# Patient Record
Sex: FEMALE | Race: WHITE | NOT HISPANIC OR LATINO | Employment: OTHER | URBAN - METROPOLITAN AREA
[De-identification: names, ages, dates, MRNs, and addresses within clinical notes are randomized per-mention and may not be internally consistent; named-entity substitution may affect disease eponyms.]

---

## 2017-08-08 ENCOUNTER — TRANSCRIBE ORDERS (OUTPATIENT)
Dept: ADMINISTRATIVE | Facility: HOSPITAL | Age: 62
End: 2017-08-08

## 2017-08-08 DIAGNOSIS — R01.1 CARDIAC MURMUR: Primary | ICD-10-CM

## 2017-08-25 ENCOUNTER — OFFICE VISIT (OUTPATIENT)
Dept: NEUROLOGY | Facility: CLINIC | Age: 62
End: 2017-08-25

## 2017-08-25 VITALS
HEIGHT: 64 IN | HEART RATE: 89 BPM | WEIGHT: 158 LBS | SYSTOLIC BLOOD PRESSURE: 148 MMHG | DIASTOLIC BLOOD PRESSURE: 83 MMHG | OXYGEN SATURATION: 98 % | BODY MASS INDEX: 26.98 KG/M2

## 2017-08-25 DIAGNOSIS — G51.0 RIGHT-SIDED BELL'S PALSY: Primary | ICD-10-CM

## 2017-08-25 DIAGNOSIS — G24.5 BLEPHAROSPASM: ICD-10-CM

## 2017-08-25 PROCEDURE — 99204 OFFICE O/P NEW MOD 45 MIN: CPT | Performed by: NURSE PRACTITIONER

## 2017-08-25 RX ORDER — BACLOFEN 10 MG/1
10 TABLET ORAL 2 TIMES DAILY PRN
Qty: 60 TABLET | Refills: 1 | Status: SHIPPED | OUTPATIENT
Start: 2017-08-25 | End: 2017-09-22

## 2017-08-25 RX ORDER — PREDNISONE 10 MG/1
TABLET ORAL
Qty: 42 TABLET | Refills: 0 | Status: SHIPPED | OUTPATIENT
Start: 2017-08-25 | End: 2017-09-22

## 2017-08-25 NOTE — PROGRESS NOTES
Subjective:     Patient ID: Alaina Kennedy is a 62 y.o. female.    HPI:   History of Present Illness     This is a very pleasant 62-year-old female who presents for initial neurological evaluation of persistent right facial bells palsy.  She was diagnosed with right facial bells palsy on 12/6/16 at [facility].  She developed symptoms one day prior.  She tells me that her symptoms were severe at the time where she could not close her right eye and had to sleep with her eye sealed shut.  She tells me that her symptoms actually cleared up on 5/15/17 and then we had a small cold spell of weather and her symptoms came back on 6/1/2017.  She tells me that she's been getting acupuncture every 1-2 weeks for her symptoms and this has improved overall.  She tells me that she had significant hearing loss when she had the Bell's palsy and so she when saw an ear nose and throat specialist and he ordered an MRI of the brain on 1/15/17 which she had completed at [facility] and tells me there is significant swelling either over the nerve or somewhere near the EEG her.  She does not have a copy of this report today.  Initially she was treated with 2 weeks of prednisone and she was also treated with antiviral medication at initial onset of Bell's palsy.  She does have a history of rheumatoid arthritis and is on Remicade IV as well as methotrexate and followed by Dr. Mckoy at [facility] [place].  She tells me that she is delayed typically and healing process.  Concerning symptoms today are that she has a right eye twitch which is very bothersome to her.  She tells me that she takes Flexeril for chronic neck pain following a C1 to C2 fusion in 1997.  She tells me that this has not seemed to touch the symptoms.  She felt that she has a mild asymmetry in the right face and that the right thigh spasm is bothering her.  She tells me this is not painful but more annoying.  She tells me that stress does  worsen her symptoms.  She tells me that she does work a very stressful job.  She has been getting acupuncture every week and this has helped some but returns with the right eye spasms about 2 days before she is due for her next acupuncture treatment.  She denies any numbness or tingling in the face.  She does feel like a tightening of the right face at times.  She denies any rashes or any other acute symptoms to correlate with the recurrence of symptoms.    The following portions of the patient's history were reviewed and updated as appropriate: allergies, current medications, past family history, past medical history, past social history, past surgical history and problem list.    Past Medical History:   Diagnosis Date   • Arthritis    • Bell's palsy    • Neuropathy    • Rheumatoid arthritis        Past Surgical History:   Procedure Laterality Date   • BACK SURGERY     • CHOLECYSTECTOMY     • FOOT SURGERY     • HYSTERECTOMY     • KNEE SURGERY     • TOTAL HIP ARTHROPLASTY         Social History     Social History   • Marital status:      Spouse name: N/A   • Number of children: N/A   • Years of education: N/A     Occupational History   • Not on file.     Social History Main Topics   • Smoking status: Never Smoker   • Smokeless tobacco: Not on file   • Alcohol use No   • Drug use: No   • Sexual activity: Defer     Other Topics Concern   • Not on file     Social History Narrative       Family History   Problem Relation Age of Onset   • Cancer Father    • Cancer Maternal Grandmother    • Stroke Maternal Grandfather         Review of Systems   Constitutional: Negative for chills, fatigue, fever and unexpected weight change.   HENT: Negative for ear pain, hearing loss, nosebleeds, rhinorrhea and sore throat.    Eyes: Negative for photophobia, pain, discharge, itching and visual disturbance.   Respiratory: Negative for cough, chest tightness, shortness of breath and wheezing.    Cardiovascular: Negative for chest  pain, palpitations and leg swelling.   Gastrointestinal: Negative for abdominal pain, blood in stool, constipation, diarrhea, nausea and vomiting.   Genitourinary: Negative for dysuria, frequency, hematuria and urgency.   Musculoskeletal: Negative for arthralgias, back pain, gait problem, joint swelling, myalgias, neck pain and neck stiffness.   Skin: Negative for rash and wound.   Allergic/Immunologic: Negative for environmental allergies and food allergies.   Neurological: Negative for dizziness, tremors, seizures, syncope, speech difficulty, weakness, light-headedness, numbness and headaches.   Hematological: Negative for adenopathy. Does not bruise/bleed easily.   Psychiatric/Behavioral: Negative for agitation, confusion, decreased concentration, hallucinations, sleep disturbance and suicidal ideas. The patient is not nervous/anxious.         Objective:    Neurologic Exam     Mental Status   Oriented to person, place, and time.   Registration: recalls 3 of 3 objects. Recall at 5 minutes: recalls 3 of 3 objects. Follows 3 step commands.   Attention: normal. Concentration: normal.   Speech: speech is normal   Level of consciousness: alert  Knowledge: good and consistent with education. Able to perform simple calculations.   Able to name object. Able to read. Able to repeat. Able to write. Normal comprehension.     Cranial Nerves     CN II   Visual fields full to confrontation.     CN III, IV, VI   Pupils are equal, round, and reactive to light.  Extraocular motions are normal.     CN V   Facial sensation intact.     CN VII   Right facial weakness: peripheral (very subtle weakness of right face, able to close and open eye fully, able to puff cheeks with minimal right facial assymetry, House-Brackmann Scale II-III dysfunction)  Left facial weakness: none  Right taste: normal  Left taste: normal    CN VIII   Hearing: impaired    CN IX, X   CN IX normal.   CN X normal.     CN XI   CN XI normal.     CN XII   CN XII  normal.     Motor Exam   Muscle bulk: normal  Overall muscle tone: normal    Strength   Strength 5/5 throughout.     Sensory Exam   Light touch normal.   Vibration normal.   Proprioception normal.   Pinprick normal.     Gait, Coordination, and Reflexes     Gait  Gait: normal    Coordination   Romberg: negative  Finger to nose coordination: normal  Heel to shin coordination: normal  Tandem walking coordination: normal    Tremor   Resting tremor: absent  Intention tremor: present (minimal BUE kinetic tremor)  Action tremor: absent    Reflexes   Right brachioradialis: 2+  Left brachioradialis: 2+  Right biceps: 2+  Left biceps: 2+  Right triceps: 2+  Left triceps: 2+  Right patellar: 2+  Left patellar: 2+  Right achilles: 2+  Left achilles: 2+  Right : 2+  Left : 2+  Right plantar: normal  Left plantar: normal  Right Ruano: absent  Left Ruano: absent  Right ankle clonus: absent  Left ankle clonus: absent      Physical Exam   Constitutional: She is oriented to person, place, and time. She appears well-developed and well-nourished.   Eyes: EOM are normal. Pupils are equal, round, and reactive to light.   Fundoscopic exam:       The right eye shows red reflex.        The left eye shows red reflex.   Mild right lower blepharospasm noted   Cardiovascular: Normal rate and regular rhythm.    Murmur heard.   Systolic murmur is present with a grade of 2/6   Pulmonary/Chest: Effort normal and breath sounds normal.   Neurological: She is oriented to person, place, and time. She has normal strength. She has a normal Finger-Nose-Finger Test, a normal Heel to Shin Test, a normal Romberg Test and a normal Tandem Gait Test. Gait normal.   Reflex Scores:       Tricep reflexes are 2+ on the right side and 2+ on the left side.       Bicep reflexes are 2+ on the right side and 2+ on the left side.       Brachioradialis reflexes are 2+ on the right side and 2+ on the left side.       Patellar reflexes are 2+ on the right side  and 2+ on the left side.       Achilles reflexes are 2+ on the right side and 2+ on the left side.  Psychiatric: She has a normal mood and affect. Her speech is normal and behavior is normal. Judgment and thought content normal. Cognition and memory are normal.       Assessment/Plan:       Alaina was seen today for bell's palsey.    Diagnoses and all orders for this visit:    Right-sided Bell's palsy  -     MRI Brain With & Without Contrast  -     predniSONE (DELTASONE) 10 MG tablet; Take 6 tabs x 2 days, Take 5 tabs x 2 days, take 4 tabs x 2 days, take 3 tabs x 2 days, take 2 tabs x 2 days and 1 tab x 2 days and stop    Blepharospasm  -     MRI Brain With & Without Contrast  -     predniSONE (DELTASONE) 10 MG tablet; Take 6 tabs x 2 days, Take 5 tabs x 2 days, take 4 tabs x 2 days, take 3 tabs x 2 days, take 2 tabs x 2 days and 1 tab x 2 days and stop  -     baclofen (LIORESAL) 10 MG tablet; Take 1 tablet by mouth 2 (Two) Times a Day As Needed for Muscle Spasms.         We will order an MRI of the brain with and without contrast for recurrent right-sided Bell's palsy.  I have requested a copy of her MRI of the brain from January 2017 from [facility] for review.  I have gone ahead and ordered a 12 day steroid taper to see if this will help with her more acute symptoms.  I've also ordered baclofen for her to take just for the next few weeks to see if this helps with the spasms.  She will hold her Flexeril during that time.  I did discuss that there is an option to use Valium short-term and small doses to help with the symptoms, but she prefers not to use this medication.  She will continue with her acupuncture which has been most beneficial.  We will follow-up in 4 weeks for reevaluation of symptoms. Reviewed medications, potential side effects and signs and symptoms to report. Discussed risk versus benefits of treatment plan with patient and/or family-including medications, labs and radiology that may be  ordered. Addressed questions and concerns during visit. Patient and/or family verbalized understanding and agree with plan.    During this visit the following were done:  Labs Reviewed []    Labs Ordered []    Radiology Reports Reviewed []    Radiology Ordered [x]    PCP Records Reviewed [x]    Referring Provider Records Reviewed [x]    ER Records Reviewed []    Hospital Records Reviewed []    History Obtained From Family []    Radiology Images Reviewed []    Other Reviewed []    Records Requested [x]      EMR Dragon/Transcription Disclaimer:  Much of this encounter note is an electronic transcription of spoken language to printed text. Electronic transcription of spoken language may permit erroneous words or phrases to be inadvertently transcribed. Although I have reviewed the note for such errors, some may still exist in this documentation.      Sera Mcaedo, APRN  8/25/2017

## 2017-08-30 ENCOUNTER — HOSPITAL ENCOUNTER (OUTPATIENT)
Dept: MRI IMAGING | Facility: HOSPITAL | Age: 62
Discharge: HOME | End: 2017-08-30
Admitting: NURSE PRACTITIONER

## 2017-08-30 LAB — CREAT BLDA-MCNC: 0.8 MG/DL (ref 0.6–1.3)

## 2017-08-30 PROCEDURE — A9577 INJ MULTIHANCE: HCPCS | Performed by: NURSE PRACTITIONER

## 2017-08-30 PROCEDURE — 0 GADOBENATE DIMEGLUMINE 529 MG/ML SOLUTION: Performed by: NURSE PRACTITIONER

## 2017-08-30 PROCEDURE — 82565 ASSAY OF CREATININE: CPT

## 2017-08-30 PROCEDURE — 70553 MRI BRAIN STEM W/O & W/DYE: CPT

## 2017-08-30 RX ADMIN — GADOBENATE DIMEGLUMINE 14 ML: 529 INJECTION, SOLUTION INTRAVENOUS at 13:20

## 2017-08-31 ENCOUNTER — TELEPHONE (OUTPATIENT)
Dept: NEUROLOGY | Facility: CLINIC | Age: 62
End: 2017-08-31

## 2017-08-31 DIAGNOSIS — I73.9 MICROANGIOPATHY (HCC): Primary | ICD-10-CM

## 2017-09-06 ENCOUNTER — APPOINTMENT (OUTPATIENT)
Dept: MRI IMAGING | Facility: HOSPITAL | Age: 62
End: 2017-09-06

## 2017-09-06 RX ORDER — ASPIRIN 81 MG/1
81 TABLET ORAL DAILY
Qty: 90 TABLET | Refills: 3 | Status: SHIPPED | OUTPATIENT
Start: 2017-09-06 | End: 2018-09-06

## 2017-09-06 NOTE — TELEPHONE ENCOUNTER
CALLED PATIENT, SPOKE WITH THE PATIENT, PATIENT IS AWARE THAT WE SENT IN THE SCRIPT AND DR ALVARADO APPROVED THIS MEDICATION.

## 2017-09-06 NOTE — TELEPHONE ENCOUNTER
[facility] called me back, Dr Mckoy was out of the [place] and took a while to get back with her. Dr Mckoy said he was ok with her starting th aspirin. Please send in the medication for the patient.

## 2017-09-07 ENCOUNTER — APPOINTMENT (OUTPATIENT)
Dept: LAB | Facility: HOSPITAL | Age: 62
End: 2017-09-07

## 2017-09-07 PROCEDURE — 86592 SYPHILIS TEST NON-TREP QUAL: CPT | Performed by: NURSE PRACTITIONER

## 2017-09-07 PROCEDURE — 36415 COLL VENOUS BLD VENIPUNCTURE: CPT | Performed by: NURSE PRACTITIONER

## 2017-09-07 PROCEDURE — 82164 ANGIOTENSIN I ENZYME TEST: CPT | Performed by: NURSE PRACTITIONER

## 2017-09-07 PROCEDURE — 86617 LYME DISEASE ANTIBODY: CPT | Performed by: NURSE PRACTITIONER

## 2017-09-08 LAB
ACE SERPL-CCNC: 38 U/L (ref 14–82)
RPR SER QL: NORMAL

## 2017-09-12 LAB
B BURGDOR IGG PATRN SER IB-IMP: NEGATIVE
B BURGDOR IGM PATRN SER IB-IMP: NEGATIVE
B BURGDOR18KD IGG SER QL IB: ABNORMAL
B BURGDOR23KD IGG SER QL IB: ABNORMAL
B BURGDOR23KD IGM SER QL IB: PRESENT
B BURGDOR28KD IGG SER QL IB: ABNORMAL
B BURGDOR30KD IGG SER QL IB: ABNORMAL
B BURGDOR39KD IGG SER QL IB: ABNORMAL
B BURGDOR39KD IGM SER QL IB: ABNORMAL
B BURGDOR41KD IGG SER QL IB: ABNORMAL
B BURGDOR41KD IGM SER QL IB: ABNORMAL
B BURGDOR45KD IGG SER QL IB: ABNORMAL
B BURGDOR58KD IGG SER QL IB: PRESENT
B BURGDOR66KD IGG SER QL IB: ABNORMAL
B BURGDOR93KD IGG SER QL IB: ABNORMAL

## 2017-09-13 ENCOUNTER — HOSPITAL ENCOUNTER (OUTPATIENT)
Dept: CARDIOLOGY | Facility: HOSPITAL | Age: 62
Discharge: HOME | End: 2017-09-13
Attending: FAMILY MEDICINE | Admitting: FAMILY MEDICINE

## 2017-09-13 ENCOUNTER — TELEPHONE (OUTPATIENT)
Dept: NEUROLOGY | Facility: CLINIC | Age: 62
End: 2017-09-13

## 2017-09-13 ENCOUNTER — HOSPITAL ENCOUNTER (OUTPATIENT)
Dept: MRI IMAGING | Facility: HOSPITAL | Age: 62
Discharge: HOME | End: 2017-09-13

## 2017-09-13 VITALS — BODY MASS INDEX: 26.98 KG/M2 | WEIGHT: 158 LBS | HEIGHT: 64 IN

## 2017-09-13 DIAGNOSIS — R01.1 CARDIAC MURMUR: ICD-10-CM

## 2017-09-13 LAB
BH CV ECHO MEAS - AO ROOT AREA (BSA CORRECTED): 1.5
BH CV ECHO MEAS - AO ROOT AREA: 5.3 CM^2
BH CV ECHO MEAS - AO ROOT DIAM: 2.6 CM
BH CV ECHO MEAS - BSA(HAYCOCK): 1.8 M^2
BH CV ECHO MEAS - BSA: 1.8 M^2
BH CV ECHO MEAS - BZI_BMI: 27.1 KILOGRAMS/M^2
BH CV ECHO MEAS - BZI_METRIC_HEIGHT: 162.6 CM
BH CV ECHO MEAS - BZI_METRIC_WEIGHT: 71.7 KG
BH CV ECHO MEAS - CONTRAST EF (2CH): 54.9 ML/M^2
BH CV ECHO MEAS - CONTRAST EF 4CH: 53.1 ML/M^2
BH CV ECHO MEAS - EDV(CUBED): 69.4 ML
BH CV ECHO MEAS - EDV(MOD-SP2): 51 ML
BH CV ECHO MEAS - EDV(MOD-SP4): 64 ML
BH CV ECHO MEAS - EDV(TEICH): 74.7 ML
BH CV ECHO MEAS - EF(CUBED): 75.6 %
BH CV ECHO MEAS - EF(MOD-SP2): 54.9 %
BH CV ECHO MEAS - EF(MOD-SP4): 53.1 %
BH CV ECHO MEAS - EF(TEICH): 68 %
BH CV ECHO MEAS - ESV(CUBED): 17 ML
BH CV ECHO MEAS - ESV(MOD-SP2): 23 ML
BH CV ECHO MEAS - ESV(MOD-SP4): 30 ML
BH CV ECHO MEAS - ESV(TEICH): 23.9 ML
BH CV ECHO MEAS - FS: 37.5 %
BH CV ECHO MEAS - IVS/LVPW: 1
BH CV ECHO MEAS - IVSD: 0.99 CM
BH CV ECHO MEAS - LA DIMENSION: 3.3 CM
BH CV ECHO MEAS - LA/AO: 1.3
BH CV ECHO MEAS - LAT PEAK E' VEL: 6.1 CM/SEC
BH CV ECHO MEAS - LV DIASTOLIC VOL/BSA (35-75): 36.2 ML/M^2
BH CV ECHO MEAS - LV MASS(C)D: 128.4 GRAMS
BH CV ECHO MEAS - LV MASS(C)DI: 72.5 GRAMS/M^2
BH CV ECHO MEAS - LV SYSTOLIC VOL/BSA (12-30): 17 ML/M^2
BH CV ECHO MEAS - LVIDD: 4.1 CM
BH CV ECHO MEAS - LVIDS: 2.6 CM
BH CV ECHO MEAS - LVLD AP2: 6.4 CM
BH CV ECHO MEAS - LVLD AP4: 6.6 CM
BH CV ECHO MEAS - LVLS AP2: 5.6 CM
BH CV ECHO MEAS - LVLS AP4: 6.1 CM
BH CV ECHO MEAS - LVPWD: 0.97 CM
BH CV ECHO MEAS - MED PEAK E' VEL: 7.58 CM/SEC
BH CV ECHO MEAS - MV A MAX VEL: 86.9 CM/SEC
BH CV ECHO MEAS - MV E MAX VEL: 51.8 CM/SEC
BH CV ECHO MEAS - MV E/A: 0.6
BH CV ECHO MEAS - PA ACC SLOPE: 405 CM/SEC^2
BH CV ECHO MEAS - PA ACC TIME: 0.16 SEC
BH CV ECHO MEAS - PA PR(ACCEL): 7.9 MMHG
BH CV ECHO MEAS - RAP SYSTOLE: 3 MMHG
BH CV ECHO MEAS - RVDD: 2 CM
BH CV ECHO MEAS - RVSP: 10.6 MMHG
BH CV ECHO MEAS - SI(CUBED): 29.6 ML/M^2
BH CV ECHO MEAS - SI(MOD-SP2): 15.8 ML/M^2
BH CV ECHO MEAS - SI(MOD-SP4): 19.2 ML/M^2
BH CV ECHO MEAS - SI(TEICH): 28.7 ML/M^2
BH CV ECHO MEAS - SV(CUBED): 52.5 ML
BH CV ECHO MEAS - SV(MOD-SP2): 28 ML
BH CV ECHO MEAS - SV(MOD-SP4): 34 ML
BH CV ECHO MEAS - SV(TEICH): 50.7 ML
BH CV ECHO MEAS - TAPSE (>1.6): 2.1 CM2
BH CV ECHO MEAS - TR MAX VEL: 138 CM/SEC
BH CV VAS BP RIGHT ARM: NORMAL MMHG
BH CV XLRA - RV BASE: 4.1 CM
BH CV XLRA - RV LENGTH: 7.2 CM
BH CV XLRA - RV MID: 3.2 CM
BH CV XLRA - TDI S': 19.4 CM/SEC
E/E' RATIO: 7.6
LV EF 2D ECHO EST: 60 %

## 2017-09-13 PROCEDURE — 25010000002 SULFUR HEXAFLUORIDE MICROSPH 60.7-25 MG RECONSTITUTED SUSPENSION: Performed by: FAMILY MEDICINE

## 2017-09-13 PROCEDURE — A9577 INJ MULTIHANCE: HCPCS | Performed by: NURSE PRACTITIONER

## 2017-09-13 PROCEDURE — 70544 MR ANGIOGRAPHY HEAD W/O DYE: CPT

## 2017-09-13 PROCEDURE — 70548 MR ANGIOGRAPHY NECK W/DYE: CPT

## 2017-09-13 PROCEDURE — 0 GADOBENATE DIMEGLUMINE 529 MG/ML SOLUTION: Performed by: NURSE PRACTITIONER

## 2017-09-13 PROCEDURE — C8929 TTE W OR WO FOL WCON,DOPPLER: HCPCS

## 2017-09-13 PROCEDURE — 93306 TTE W/DOPPLER COMPLETE: CPT | Performed by: INTERNAL MEDICINE

## 2017-09-13 RX ADMIN — SULFUR HEXAFLUORIDE 4 ML: KIT at 09:45

## 2017-09-13 RX ADMIN — GADOBENATE DIMEGLUMINE 14 ML: 529 INJECTION, SOLUTION INTRAVENOUS at 10:00

## 2017-09-13 NOTE — TELEPHONE ENCOUNTER
MRA head and neck are WNL. Please let patient know. Please fax mri brain plus mra head and neck to pcp for their fyi. thanks

## 2017-09-13 NOTE — TELEPHONE ENCOUNTER
CALLED PATIENT, SPOKE WITH THE PATIENT, PATIENT IS AWARE OF THE RESULTS. PATIENT VERBALIZED UNDERSTANDING.

## 2017-09-19 ENCOUNTER — TRANSCRIBE ORDERS (OUTPATIENT)
Dept: ADMINISTRATIVE | Facility: HOSPITAL | Age: 62
End: 2017-09-19

## 2017-09-19 DIAGNOSIS — R00.2 PALPITATIONS: Primary | ICD-10-CM

## 2017-09-22 ENCOUNTER — OFFICE VISIT (OUTPATIENT)
Dept: NEUROLOGY | Facility: CLINIC | Age: 62
End: 2017-09-22

## 2017-09-22 VITALS
WEIGHT: 160 LBS | RESPIRATION RATE: 16 BRPM | HEIGHT: 64 IN | DIASTOLIC BLOOD PRESSURE: 89 MMHG | HEART RATE: 83 BPM | BODY MASS INDEX: 27.31 KG/M2 | SYSTOLIC BLOOD PRESSURE: 146 MMHG

## 2017-09-22 DIAGNOSIS — G51.0 RIGHT-SIDED BELL'S PALSY: Primary | ICD-10-CM

## 2017-09-22 DIAGNOSIS — G24.5 BLEPHAROSPASM: ICD-10-CM

## 2017-09-22 DIAGNOSIS — I73.9 MICROANGIOPATHY (HCC): ICD-10-CM

## 2017-09-22 PROCEDURE — 99214 OFFICE O/P EST MOD 30 MIN: CPT | Performed by: NURSE PRACTITIONER

## 2017-09-22 NOTE — PROGRESS NOTES
Subjective:     Patient ID: Alaina Kennedy is a 62 y.o. female.    HPI:   History of Present Illness   This is a very pleasant 62-year-old female who presents for 1 month follow-up on persistent right facial bells palsy which originally occurred on 12/6/16 and then slowly resolved on 5/15/17.  She had new symptoms present on 6/1/17 with reoccurrence of the right facial bells palsy.  We ordered an MRI of the brain with and without contrast which was completed on 8/30/17 showing bilateral periventricular white matter changes moderate with no abnormal contrast enhancement.  There was concern per the radiologist of possible demyelinating disease.  However with discussing with Dr. 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  We did check her Lyme disease, angiotensin-converting enzyme, and RPR which were all within normal limits.  She had an MRA of the head without contrast which was unremarkable on 9/13/17 and MRA of the neck with contrast on 9/13/17 which was also unremarkable.  She is followed by cardiology for heart murmur and she is scheduled to have a stress test.  She has already had her echocardiogram which showed mild regurgitation but a normal EF of 60%.  For her right Bell's palsy she has been completing acupuncture which significantly improved her symptoms and she continues with this every week.  She does have some mild right facial weakness that does persist.  She does have the right eye blepharospasm and she did not take the baclofen as she was concerned about possible side effects.  She did complete the prednisone. She feels like the blepharospasm is decreased significantly.  She has started taking aspirin 81 mg once a day and tolerating this fine.  She is followed by Dr. Gerard rheumatology for her RA and gets IV Remicade treatments.  No new  concerns today.  Prior MRI of the brain with and without contrast was done on 12/29/2016 which showed concerns of enhancement on the right seventh cranial nerve which is not seen on the most recent imaging.  It did show moderate white matter changes throughout the brain at that time which have appeared stable on most current imaging.  She has no history of hypertension and has had elevated blood pressure the past few days while she is getting over a cold and sinus infection.  She will be checking her blood pressure 3 times a week and showing this to her PCP and cardiology to ensure she does not need to be treated for elevated blood pressure long-term.    The following portions of the patient's history were reviewed and updated as appropriate: allergies, current medications, past family history, past medical history, past social history, past surgical history and problem list.    Past Medical History:   Diagnosis Date   • Arthritis    • Bell's palsy    • Neuropathy    • Rheumatoid arthritis        Past Surgical History:   Procedure Laterality Date   • BACK SURGERY     • CHOLECYSTECTOMY     • FOOT SURGERY     • HYSTERECTOMY     • KNEE SURGERY     • TOTAL HIP ARTHROPLASTY         Social History     Social History   • Marital status:      Spouse name: N/A   • Number of children: N/A   • Years of education: N/A     Occupational History   • Not on file.     Social History Main Topics   • Smoking status: Never Smoker   • Smokeless tobacco: Not on file   • Alcohol use No   • Drug use: No   • Sexual activity: Defer     Other Topics Concern   • Not on file     Social History Narrative       Family History   Problem Relation Age of Onset   • Cancer Father    • Cancer Maternal Grandmother    • Stroke Maternal Grandfather         Review of Systems   Constitutional: Negative for chills, fatigue, fever and unexpected weight change.   HENT: Negative for ear pain, hearing loss, nosebleeds, rhinorrhea and sore throat.    Eyes:  Negative for photophobia, pain, discharge, itching and visual disturbance.   Respiratory: Positive for cough. Negative for chest tightness, shortness of breath and wheezing.    Cardiovascular: Negative for chest pain, palpitations and leg swelling.   Gastrointestinal: Negative for abdominal pain, blood in stool, constipation, diarrhea, nausea and vomiting.   Genitourinary: Negative for dysuria, frequency, hematuria and urgency.   Musculoskeletal: Negative for arthralgias, back pain, gait problem, joint swelling, myalgias, neck pain and neck stiffness.   Skin: Negative for rash and wound.   Allergic/Immunologic: Negative for environmental allergies and food allergies.   Neurological: Negative for dizziness, tremors, seizures, syncope, speech difficulty, weakness, light-headedness, numbness and headaches.   Hematological: Negative for adenopathy. Does not bruise/bleed easily.   Psychiatric/Behavioral: Negative for agitation, confusion, decreased concentration, hallucinations, sleep disturbance and suicidal ideas. The patient is not nervous/anxious.         Objective:    Neurologic Exam     Mental Status   Oriented to person, place, and time.   Level of consciousness: alert    Cranial Nerves     CN II   Visual fields full to confrontation.     CN III, IV, VI   Pupils are equal, round, and reactive to light.  Extraocular motions are normal.     CN V   Facial sensation intact.     CN VII   Right facial weakness: peripheral (very subtle weakness of right face, able to close and open eye fully, able to puff cheeks with minimal right facial assymetry, House-Brackmann Scale II-III dysfunction)  Left facial weakness: none  Right taste: normal  Left taste: normal    CN VIII   Hearing: impaired    CN IX, X   CN IX normal.   CN X normal.     CN XI   CN XI normal.     CN XII   CN XII normal.        Minimal right eye blepharospasm-much improved today     Motor Exam   Muscle bulk: normal  Overall muscle tone: normal    Strength    Strength 5/5 throughout.     Gait, Coordination, and Reflexes     Gait  Gait: normal    Coordination   Finger to nose coordination: normal  Heel to shin coordination: normal    Tremor   Resting tremor: absent  Intention tremor: present (mild BUE kinetic tremor)  Action tremor: absent    Reflexes   Right brachioradialis: 2+  Left brachioradialis: 2+  Right biceps: 2+  Left biceps: 2+  Right triceps: 2+  Left triceps: 2+  Right patellar: 2+  Left patellar: 2+  Right achilles: 2+  Left achilles: 2+  Right : 2+  Left : 2+  Right plantar: normal  Left plantar: normal      Physical Exam   Constitutional: She is oriented to person, place, and time.   Eyes: EOM are normal. Pupils are equal, round, and reactive to light.   Neurological: She is oriented to person, place, and time. She has normal strength. She has a normal Finger-Nose-Finger Test and a normal Heel to Shin Test. Gait normal.   Reflex Scores:       Tricep reflexes are 2+ on the right side and 2+ on the left side.       Bicep reflexes are 2+ on the right side and 2+ on the left side.       Brachioradialis reflexes are 2+ on the right side and 2+ on the left side.       Patellar reflexes are 2+ on the right side and 2+ on the left side.       Achilles reflexes are 2+ on the right side and 2+ on the left side.      Assessment/Plan:       Alaina was seen today for bell's palsy.    Diagnoses and all orders for this visit:    Right-sided Bell's palsy  Comments:  Continue Acupuncture and continued imrpovement.    Blepharospasm  Comments:  Improving. Did not take Baclofen. Uses Flexeril PRN.    Microangiopathy  Comments:  MRA head/MRA neck WNL. MRI brain with and without contrast stable nonenhancing lesions. Likely secondary to Hx Chronic Migraines.          Reviewed medications, potential side effects and signs and symptoms to report. Discussed risk versus benefits of treatment plan with patient and/or family-including medications, labs and radiology that may  be ordered. Addressed questions and concerns during visit. Patient and/or family verbalized understanding and agree with plan. F/U PRN. Continue with Acupuncture. Reviewed MRI Brain, MRA head and neck imaging with patient today. Continue Aspirin daily. Recommend monitoring BP and f/u with PCP if persistently elevated.   During this visit the following were done:  Labs Reviewed [x]    Labs Ordered []    Radiology Reports Reviewed [x]    Radiology Ordered []    PCP Records Reviewed []    Referring Provider Records Reviewed []    ER Records Reviewed []    Hospital Records Reviewed []    History Obtained From Family []    Radiology Images Reviewed [x]    Other Reviewed [x]    Records Requested []      EMR Dragon/Transcription Disclaimer:  Much of this encounter note is an electronic transcription of spoken language to printed text. Electronic transcription of spoken language may permit erroneous words or phrases to be inadvertently transcribed. Although I have reviewed the note for such errors, some may still exist in this documentation.      Sera Macedo, APRN  9/22/2017

## 2017-10-05 ENCOUNTER — HOSPITAL ENCOUNTER (OUTPATIENT)
Dept: CARDIOLOGY | Facility: HOSPITAL | Age: 62
Discharge: HOME | End: 2017-10-05
Attending: FAMILY MEDICINE

## 2017-10-05 ENCOUNTER — HOSPITAL ENCOUNTER (OUTPATIENT)
Dept: CARDIOLOGY | Facility: HOSPITAL | Age: 62
Discharge: HOME | End: 2017-10-05
Attending: FAMILY MEDICINE | Admitting: FAMILY MEDICINE

## 2017-10-05 DIAGNOSIS — R00.2 PALPITATIONS: ICD-10-CM

## 2017-10-05 LAB
BH CV STRESS BP STAGE 2: NORMAL
BH CV STRESS BP STAGE 3: NORMAL
BH CV STRESS COMMENTS STAGE 1: NORMAL
BH CV STRESS DOSE REGADENOSON STAGE 1: 0.4
BH CV STRESS DURATION MIN STAGE 1: 0
BH CV STRESS DURATION MIN STAGE 2: 1
BH CV STRESS DURATION MIN STAGE 3: 1
BH CV STRESS DURATION MIN STAGE 4: 1
BH CV STRESS DURATION SEC STAGE 1: 15
BH CV STRESS DURATION SEC STAGE 2: 0
BH CV STRESS HR STAGE 1: 106
BH CV STRESS HR STAGE 2: 104
BH CV STRESS HR STAGE 3: 95
BH CV STRESS HR STAGE 4: 93
BH CV STRESS PROTOCOL 1: NORMAL
BH CV STRESS RECOVERY BP: NORMAL MMHG
BH CV STRESS RECOVERY HR: 89 BPM
BH CV STRESS STAGE 1: 1
BH CV STRESS STAGE 2: 2
BH CV STRESS STAGE 3: 3
BH CV STRESS STAGE 4: 4
LV EF NUC BP: 88 %
MAXIMAL PREDICTED HEART RATE: 158 BPM
PERCENT MAX PREDICTED HR: 67.09 %
STRESS BASELINE BP: NORMAL MMHG
STRESS BASELINE HR: 74 BPM
STRESS PERCENT HR: 79 %
STRESS POST PEAK BP: NORMAL MMHG
STRESS POST PEAK HR: 106 BPM
STRESS TARGET HR: 134 BPM

## 2017-10-05 PROCEDURE — 25010000002 REGADENOSON 0.4 MG/5ML SOLUTION: Performed by: FAMILY MEDICINE

## 2017-10-05 PROCEDURE — 78452 HT MUSCLE IMAGE SPECT MULT: CPT | Performed by: INTERNAL MEDICINE

## 2017-10-05 PROCEDURE — 78452 HT MUSCLE IMAGE SPECT MULT: CPT

## 2017-10-05 PROCEDURE — 0 TECHNETIUM SESTAMIBI: Performed by: FAMILY MEDICINE

## 2017-10-05 PROCEDURE — A9500 TC99M SESTAMIBI: HCPCS | Performed by: FAMILY MEDICINE

## 2017-10-05 PROCEDURE — 93017 CV STRESS TEST TRACING ONLY: CPT

## 2017-10-05 PROCEDURE — 93018 CV STRESS TEST I&R ONLY: CPT | Performed by: INTERNAL MEDICINE

## 2017-10-05 RX ADMIN — TECHNETIUM TC-99M SESTAMIBI 1 DOSE: 1 INJECTION INTRAVENOUS at 07:45

## 2017-10-05 RX ADMIN — TECHNETIUM TC-99M SESTAMIBI 1 DOSE: 1 INJECTION INTRAVENOUS at 09:25

## 2017-10-05 RX ADMIN — REGADENOSON 0.4 MG: 0.08 INJECTION, SOLUTION INTRAVENOUS at 09:25

## 2018-07-16 ENCOUNTER — OFFICE VISIT (OUTPATIENT)
Dept: NEUROLOGY | Facility: CLINIC | Age: 63
End: 2018-07-16

## 2018-07-16 ENCOUNTER — APPOINTMENT (OUTPATIENT)
Dept: LAB | Facility: HOSPITAL | Age: 63
End: 2018-07-16

## 2018-07-16 VITALS
WEIGHT: 160 LBS | SYSTOLIC BLOOD PRESSURE: 132 MMHG | DIASTOLIC BLOOD PRESSURE: 72 MMHG | OXYGEN SATURATION: 99 % | HEART RATE: 72 BPM | BODY MASS INDEX: 27.46 KG/M2

## 2018-07-16 DIAGNOSIS — G24.5 BLEPHAROSPASM: ICD-10-CM

## 2018-07-16 DIAGNOSIS — G51.0 RIGHT-SIDED BELL'S PALSY: ICD-10-CM

## 2018-07-16 DIAGNOSIS — R25.1 TREMOR: Primary | ICD-10-CM

## 2018-07-16 DIAGNOSIS — R25.3 MUSCLE FASCICULATION: ICD-10-CM

## 2018-07-16 DIAGNOSIS — G62.9 NEUROPATHY: ICD-10-CM

## 2018-07-16 DIAGNOSIS — I73.9 MICROANGIOPATHY (HCC): ICD-10-CM

## 2018-07-16 LAB
CK MB SERPL-CCNC: 1.08 NG/ML (ref 0–5)
CK SERPL-CCNC: 95 U/L (ref 26–174)

## 2018-07-16 PROCEDURE — 82390 ASSAY OF CERULOPLASMIN: CPT | Performed by: NURSE PRACTITIONER

## 2018-07-16 PROCEDURE — 82553 CREATINE MB FRACTION: CPT | Performed by: NURSE PRACTITIONER

## 2018-07-16 PROCEDURE — 82550 ASSAY OF CK (CPK): CPT | Performed by: NURSE PRACTITIONER

## 2018-07-16 PROCEDURE — 99214 OFFICE O/P EST MOD 30 MIN: CPT | Performed by: NURSE PRACTITIONER

## 2018-07-16 PROCEDURE — 36415 COLL VENOUS BLD VENIPUNCTURE: CPT | Performed by: NURSE PRACTITIONER

## 2018-07-16 PROCEDURE — 82525 ASSAY OF COPPER: CPT | Performed by: NURSE PRACTITIONER

## 2018-07-16 RX ORDER — HYDROXYZINE HYDROCHLORIDE 25 MG/1
25 TABLET, FILM COATED ORAL EVERY 6 HOURS PRN
Qty: 30 TABLET | Refills: 1 | Status: SHIPPED | OUTPATIENT
Start: 2018-07-16 | End: 2019-07-18

## 2018-07-16 NOTE — PROGRESS NOTES
"Subjective:     Patient ID: Alaina Kennedy is a 63 y.o. female.    CC:   Chief Complaint   Patient presents with   • Tremors       HPI:   History of Present Illness   This is a very pleasant 64 yo female who presents for sooner appointment to evaluate tremors and \"internal full body trembling\" more obvious in the evening over the past 8-9 months. She previously on exam did have a mild bilateral upper extremity kinetic tremor.  She tells me that her mom has had a tremor as long as she can remember.  She tells me that she did see her primary care provider recently for the trembling and she did labs including TSH 2.83, free T4 1 0.12, vitamin D 27.2 and vitamin B12 750.  She did recommend she take over-the-counter vitamin D supplement.  She is very concerned that this is related to stress from her work.  She tells me that she will start trembling due to physically being exhausted.  She tells me that her symptoms may last a minute and she can't see any physical or visual tremors. She tells me \"I feel like I am trembling from head to toe on the inside\"  She tells me that it very sporadic and normally after work and in the evenings and tylenol has helped her relax and go to sleep.  She tells me that she has a really high stress job and is a type A personality and does not think she deals with stress well.  She tells me over the weekend she completely relaxed and enjoyed the pool and did not have any of these episodes.  She does denies any shortness of breath, tachycardia, sweating or other symptoms.  She does tell me that she continues to have symptoms from the right Bell's palsy but this has improved except for her blepharospasm which she'll be getting Botox soon to treat this.  She tells me that she doesn't get any pain in her scalp and the right posterior occipital region she will get an occasional \"swirl\" numb and tingling sensation.  She tells me that she was diagnosed with neuropathy in bilateral lower extremities " about 14 years ago and did have an EMG and NCVS at that time.  She tells me that she used to have severe leg cramps but she gets acupuncture every other week and this has helped her symptoms significantly.  She has not taken medications for neuropathy.  She denies any difficulty with constipation, loss of smell, dysphagia, difficulty with ambulation, weakness or other neurological symptoms.    Previously seen in office 9/22/17 for follow up on right facial bells palsy which originally occurred on 12/6/16 and then slowly resolved on 5/15/17.  She had new symptoms present on 6/1/17 with reoccurrence of the right facial bells palsy. MRI of the brain with and without contrast which was completed on 8/30/17 showing bilateral periventricular white matter changes moderate with no abnormal contrast enhancement. Showed microangiopathy with history of severe migraines in past and was started on Aspirin 81mg daily. We did check her Lyme disease, angiotensin-converting enzyme, and RPR which were all within normal limits.  She had an MRA of the head without contrast which was unremarkable on 9/13/17 and MRA of the neck with contrast on 9/13/17 which was also unremarkable. 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  She is followed by Dr. Gerard rheumatology for her RA and gets IV Remicade treatments.     The following portions of the patient's history were reviewed and updated as appropriate: allergies, current medications, past family history, past medical history, past social history, past surgical history and problem list.    Past Medical History:   Diagnosis Date   • Arthritis    • Bell's palsy    • Neuropathy    • Rheumatoid arthritis (CMS/HCC)        Past Surgical History:   Procedure Laterality Date   • BACK SURGERY     • CHOLECYSTECTOMY     • FOOT SURGERY     • HYSTERECTOMY     •  KNEE SURGERY     • TOTAL HIP ARTHROPLASTY         Social History     Social History   • Marital status:      Spouse name: N/A   • Number of children: N/A   • Years of education: N/A     Occupational History   • Not on file.     Social History Main Topics   • Smoking status: Never Smoker   • Smokeless tobacco: Not on file   • Alcohol use No   • Drug use: No   • Sexual activity: Defer     Other Topics Concern   • Not on file     Social History Narrative   • No narrative on file       Family History   Problem Relation Age of Onset   • Cancer Father    • Cancer Maternal Grandmother    • Stroke Maternal Grandfather         Review of Systems   Constitutional: Positive for fatigue. Negative for chills, fever and unexpected weight change.   HENT: Negative for ear pain, hearing loss, nosebleeds, rhinorrhea and sore throat.    Eyes: Negative for photophobia, pain, discharge, itching and visual disturbance.   Respiratory: Negative for cough, chest tightness, shortness of breath and wheezing.    Cardiovascular: Negative for chest pain, palpitations and leg swelling.   Gastrointestinal: Negative for abdominal pain, blood in stool, constipation, diarrhea, nausea and vomiting.   Genitourinary: Negative for dysuria, frequency, hematuria and urgency.   Musculoskeletal: Negative for arthralgias, back pain, gait problem, joint swelling, myalgias, neck pain and neck stiffness.   Skin: Negative for rash and wound.   Allergic/Immunologic: Negative for environmental allergies and food allergies.   Neurological: Positive for tremors. Negative for dizziness, seizures, syncope, speech difficulty, weakness, light-headedness, numbness and headaches.   Hematological: Negative for adenopathy. Does not bruise/bleed easily.   Psychiatric/Behavioral: Positive for sleep disturbance. Negative for agitation, confusion, decreased concentration, hallucinations and suicidal ideas. The patient is nervous/anxious.    All other systems reviewed and  are negative.       Objective:    Neurologic Exam     Mental Status   Oriented to person, place, and time.   Speech: speech is normal   Level of consciousness: alert    Cranial Nerves     CN II   Visual fields full to confrontation.     CN III, IV, VI   Pupils are equal, round, and reactive to light.  Extraocular motions are normal.     CN V   Facial sensation intact.     CN VII   Right facial weakness: peripheral (very subtle weakness of right face, able to close and open eye fully, able to puff cheeks with minimal right facial assymetry, House-Brackmann Scale II-III dysfunction)    CN VIII   CN VIII normal.     CN IX, X   CN IX normal.   CN X normal.     CN XI   CN XI normal.     CN XII   CN XII normal.     Blepharospasm right eye noted on exam.     Motor Exam   Muscle bulk: normal  Overall muscle tone: normal    Strength   Strength 5/5 throughout.   No fasciculations notes on exam today.        Sensory Exam   Light touch normal.   Right leg vibration: decreased from toes  Left leg vibration: decreased from toes  Proprioception normal.   Pinprick normal.   Toes cool to touch with good pulses.      Gait, Coordination, and Reflexes     Gait  Gait: normal    Coordination   Romberg: negative  Finger to nose coordination: normal  Heel to shin coordination: normal  Tandem walking coordination: normal    Tremor   Resting tremor: absent  Intention tremor: present (minimal BUE kinetic tremor )  Action tremor: absent    Reflexes   Right brachioradialis: 2+  Left brachioradialis: 2+  Right biceps: 2+  Left biceps: 2+  Right triceps: 2+  Left triceps: 2+  Right patellar: 1+  Left patellar: 1+  Right achilles: 1+  Left achilles: 1+  Right : 2+  Left : 2+  No resting/pill rolling tremor. Normal Finger tapping and Heel Tapping. Able to rise from chair, turn and sit back down in 4 seconds without shuffling gait or difficulty.       Physical Exam   Constitutional: She is oriented to person, place, and time.   Eyes: EOM are  normal. Pupils are equal, round, and reactive to light.   Fundoscopic exam:       The right eye shows red reflex.        The left eye shows red reflex.       Neurological Sensory Findings - Altered hot/cold right ankle/foot discrimination and altered hot/cold left ankle/foot discrimination. Unaltered sharp/dull right ankle/foot discrimination and unaltered sharp/dull left ankle/foot discrimination. No right ankle/foot altered proprioception and no left ankle/foot altered proprioception  Vascular Status -  Her right foot exhibits normal foot vasculature  and no edema. Her left foot exhibits normal foot vasculature  and no edema.  Skin Integrity  -  Her right foot skin is intact.Her left foot skin is intact..  Neurological: She is oriented to person, place, and time. She has normal strength. She has a normal Finger-Nose-Finger Test, a normal Heel to Shin Test, a normal Romberg Test and a normal Tandem Gait Test. Gait normal.   Reflex Scores:       Tricep reflexes are 2+ on the right side and 2+ on the left side.       Bicep reflexes are 2+ on the right side and 2+ on the left side.       Brachioradialis reflexes are 2+ on the right side and 2+ on the left side.       Patellar reflexes are 1+ on the right side and 1+ on the left side.       Achilles reflexes are 1+ on the right side and 1+ on the left side.  Psychiatric: Her speech is normal and behavior is normal. Judgment and thought content normal. Her mood appears anxious. Cognition and memory are normal.       Assessment/Plan:       Alaina was seen today for tremors.    Diagnoses and all orders for this visit:    Tremor  Comments:  Suspected 2nd to Anxiety but will rule out any other etiology. Mother has Tremor.  Orders:  -     Copper, Serum  -     Ceruloplasmin  -     CK Total & CKMB  -     EMG & Nerve Conduction Test  -     EEG Awake or Drowsy Routine  -     hydrOXYzine (ATARAX) 25 MG tablet; Take 1 tablet by mouth Every 6 (Six) Hours As Needed for  Anxiety.    Muscle fasciculation  -     Copper, Serum  -     Ceruloplasmin  -     CK Total & CKMB  -     EMG & Nerve Conduction Test  -     EEG Awake or Drowsy Routine    Right-sided Bell's palsy  Comments:  Chronic. Continue Accupuncture and Home PT exercises.    Blepharospasm  Comments:  Scheduled to get Botox with  Opthalmology    Microangiopathy (CMS/HCC)  Comments:  On Aspirin 81mg daily. Stable on MRI Brain with and without contrast 8/30/2017. MRA Head/Neck Normal 2017.    Neuropathy  Comments:  Dx Neuropathy BLE 14 years ago with abnormal EMG/NCVS at the time. Accupuncture has helped. Has not taken medications. EMG/NCVS to eval.         Labs to rule out other etiology of symptoms.  We will do an EEG to rule out any type of partial seizure although this is unlikely.  We will get another EMG and NCVS to evaluate for neuropathy.  She'll continue her aspirin daily.  We'll wait on additional imaging at this time.  I have given her some hydroxyzine to take at onset of her symptoms to see if this helps since it does treat anxiety.  She will follow-up in 3 months or sooner if needed. No evidence for Parkinson's noted on exam. Reviewed medications, potential side effects and signs and symptoms to report. Discussed risk versus benefits of treatment plan with patient and/or family-including medications, labs and radiology that may be ordered. Addressed questions and concerns during visit. Patient and/or family verbalized understanding and agree with plan.    During this visit the following were done:  Labs Reviewed [x]    Labs Ordered [x]    Radiology Reports Reviewed [x]    Radiology Ordered []    PCP Records Reviewed []    Referring Provider Records Reviewed []    ER Records Reviewed []    Hospital Records Reviewed []    History Obtained From Family []    Radiology Images Reviewed [x]    Other Reviewed []    Records Requested []      EMR Dragon/Transcription Disclaimer:  Much of this encounter note is an electronic  transcription of spoken language to printed text. Electronic transcription of spoken language may permit erroneous words or phrases to be inadvertently transcribed. Although I have reviewed the note for such errors, some may still exist in this documentation.        Sera Macedo, APRN  7/16/2018

## 2018-07-18 LAB — CERULOPLASMIN SERPL-MCNC: 24.8 MG/DL (ref 19–39)

## 2018-07-19 ENCOUNTER — TELEPHONE (OUTPATIENT)
Dept: NEUROLOGY | Facility: CLINIC | Age: 63
End: 2018-07-19

## 2018-07-19 LAB — COPPER SERPL-MCNC: 117 UG/DL (ref 72–166)

## 2018-07-19 NOTE — TELEPHONE ENCOUNTER
----- Message from BJ Lyman sent at 7/19/2018  8:25 AM EDT -----  Please notify patient All labs are normal. She will complete additional tests as ordered and we will follow up as scheduled in office. Thanks, BJ Mota

## 2018-07-19 NOTE — TELEPHONE ENCOUNTER
I called pt and let her know that her labs were all normal and she verbalized understanding and is scheduled for the other procedures on [id number]

## 2018-07-19 NOTE — PROGRESS NOTES
Please notify patient All labs are normal. She will complete additional tests as ordered and we will follow up as scheduled in office. Thanks, BJ Mota

## 2018-09-10 ENCOUNTER — HOSPITAL ENCOUNTER (OUTPATIENT)
Dept: NEUROLOGY | Facility: HOSPITAL | Age: 63
Discharge: HOME | End: 2018-09-10

## 2018-09-10 ENCOUNTER — TELEPHONE (OUTPATIENT)
Dept: NEUROLOGY | Facility: CLINIC | Age: 63
End: 2018-09-10

## 2018-09-10 ENCOUNTER — HOSPITAL ENCOUNTER (OUTPATIENT)
Dept: NEUROLOGY | Facility: HOSPITAL | Age: 63
Discharge: HOME | End: 2018-09-10
Admitting: NURSE PRACTITIONER

## 2018-09-10 PROCEDURE — 95886 MUSC TEST DONE W/N TEST COMP: CPT

## 2018-09-10 PROCEDURE — 95909 NRV CNDJ TST 5-6 STUDIES: CPT

## 2018-09-10 PROCEDURE — 95816 EEG AWAKE AND DROWSY: CPT

## 2018-09-10 NOTE — TELEPHONE ENCOUNTER
----- Message from BJ Lyman sent at 9/10/2018  1:13 PM EDT -----  Please notify patient the EMG/NCVS shows chronic moderate neuropathy. No acute findings. Fax results to PCP. Thanks, BJ Mota

## 2019-07-18 ENCOUNTER — OFFICE VISIT (OUTPATIENT)
Dept: NEUROLOGY | Facility: CLINIC | Age: 64
End: 2019-07-18

## 2019-07-18 VITALS
WEIGHT: 150 LBS | OXYGEN SATURATION: 98 % | HEIGHT: 64 IN | HEART RATE: 80 BPM | DIASTOLIC BLOOD PRESSURE: 78 MMHG | SYSTOLIC BLOOD PRESSURE: 124 MMHG | BODY MASS INDEX: 25.61 KG/M2

## 2019-07-18 DIAGNOSIS — G51.0 RIGHT-SIDED BELL'S PALSY: ICD-10-CM

## 2019-07-18 DIAGNOSIS — I73.9 MICROANGIOPATHY (HCC): Primary | ICD-10-CM

## 2019-07-18 DIAGNOSIS — G62.9 NEUROPATHY: ICD-10-CM

## 2019-07-18 DIAGNOSIS — G24.5 BLEPHAROSPASM: ICD-10-CM

## 2019-07-18 DIAGNOSIS — R25.1 TREMOR: ICD-10-CM

## 2019-07-18 PROCEDURE — 99213 OFFICE O/P EST LOW 20 MIN: CPT | Performed by: NURSE PRACTITIONER

## 2019-07-18 RX ORDER — OMEPRAZOLE 40 MG/1
20 CAPSULE, DELAYED RELEASE ORAL DAILY
Refills: 5 | COMMUNITY
Start: 2019-04-25

## 2019-07-18 RX ORDER — SULINDAC 200 MG/1
TABLET ORAL
Refills: 1 | COMMUNITY
Start: 2019-05-15 | End: 2021-01-14 | Stop reason: HOSPADM

## 2019-07-18 NOTE — PROGRESS NOTES
Subjective:     Patient ID: Alaina Kennedy is a 64 y.o. female.    CC:   Chief Complaint   Patient presents with   • Tremors       HPI:   History of Present Illness   This is a very pleasant 65 yo female who presents for 1 year follow up on mild bilateral upper extremity kinetic tremor intermittent for the past 18 months.  She tells me that her mom has had a tremor as long as she can remember. Since she retired she has had almost no tremors and rarely notices this.She denies any difficulty with constipation, loss of smell, dysphagia, difficulty with ambulation, weakness or other neurological symptoms. Denies memory issues. Doing well and enjoying group home. EEG 9/2018 for one episode of shaking was normal. No other spells with significant reduction in stress.    She also has peripheral neuropathy moderate axonal BLE with most recent emg/ncvs 9/2018. She has no pain. Numbness and tingling intermittent. Responds well to acupuncture. Prefers conservative therapy.    She does tell me that she continues to have symptoms from the right Bell's palsy with blepharospasm. Seeing Dr. Pino at  ophthalmology and will have cataracts removed 8/2019 and then start Botox injections.  .     Previously seen in office 9/22/17 for follow up on right facial bells palsy which originally occurred on 12/6/16 and then slowly resolved on 5/15/17.  She had new symptoms present on 6/1/17 with reoccurrence of the right facial bells palsy. MRI of the brain with and without contrast which was completed on 8/30/17 showing bilateral periventricular white matter changes moderate with no abnormal contrast enhancement. Showed microangiopathy with history of severe migraines in past and was started on Aspirin 81mg daily. We did check her Lyme disease, angiotensin-converting enzyme, and RPR which were all within normal limits.  She had an MRA of the head without contrast which was unremarkable on 9/13/17 and MRA of the neck with contrast on 9/13/17  which was also unremarkable.     She is followed by Dr. Gerard rheumatology for her RA and gets IV Remicade treatments.     The following portions of the patient's history were reviewed and updated as appropriate: allergies, current medications, past family history, past medical history, past social history, past surgical history and problem list.    Past Medical History:   Diagnosis Date   • Arthritis    • Bell's palsy    • Neuropathy    • Rheumatoid arthritis (CMS/HCC)        Past Surgical History:   Procedure Laterality Date   • BACK SURGERY     • CHOLECYSTECTOMY     • FOOT SURGERY     • HYSTERECTOMY     • KNEE SURGERY     • TOTAL HIP ARTHROPLASTY         Social History     Socioeconomic History   • Marital status:      Spouse name: Not on file   • Number of children: Not on file   • Years of education: Not on file   • Highest education level: Not on file   Tobacco Use   • Smoking status: Never Smoker   Substance and Sexual Activity   • Alcohol use: No   • Drug use: No   • Sexual activity: Defer       Family History   Problem Relation Age of Onset   • Cancer Father    • Cancer Maternal Grandmother    • Stroke Maternal Grandfather         Review of Systems   Constitutional: Negative.    HENT: Positive for facial swelling.    Respiratory: Negative.    Cardiovascular: Negative.    Gastrointestinal: Negative.    Endocrine: Negative.    Genitourinary: Negative.    Musculoskeletal: Positive for arthralgias.   Skin: Negative.    Neurological: Positive for tremors and facial asymmetry (bells palsy).   Hematological: Negative.    Psychiatric/Behavioral: Negative.         Objective:    Neurologic Exam   Mental Status   Oriented to person, place, and time.   Speech: speech is normal   Level of consciousness: alert     Cranial Nerves      CN II   Visual fields full to confrontation.      CN III, IV, VI   Pupils are equal, round, and reactive to light.  Extraocular motions are normal.      CN V   Facial sensation intact.       CN VII   Right facial weakness: peripheral (very subtle weakness of right face, able to close and open eye fully, able to puff cheeks with minimal right facial assymetry, House-Brackmann Scale II-III dysfunction)     CN VIII   CN VIII normal.      CN IX, X   CN IX normal.   CN X normal.      CN XI   CN XI normal.      CN XII   CN XII normal.     Blepharospasm right eye noted on exam.      Motor Exam   Muscle bulk: normal  Overall muscle tone: normal     Strength   Strength 5/5 throughout.     Sensory Exam   Light touch normal.   Right leg vibration: decreased from toes  Left leg vibration: decreased from toes  Proprioception normal.   Pinprick normal.   Toes cool to touch with good pulses.       Gait, Coordination, and Reflexes      Gait  Gait: normal     Coordination   Romberg: negative  Finger to nose coordination: normal  Heel to shin coordination: normal  Tandem walking coordination: normal     Tremor   Resting tremor: absent  Intention tremor: present (minimal BUE kinetic tremor )  Action tremor: absent     Reflexes   Right brachioradialis: 2+  Left brachioradialis: 2+  Right biceps: 2+  Left biceps: 2+  Right triceps: 2+  Left triceps: 2+  Right patellar: 1+  Left patellar: 1+  Right achilles: 1+  Left achilles: 1+  Right : 2+  Left : 2+  No resting/pill rolling tremor. Normal Finger tapping and Heel Tapping. Able to rise from chair, turn and sit back down in 4 seconds without shuffling gait or difficulty.      Physical Exam  Reflex Scores:       Tricep reflexes are 2+ on the right side and 2+ on the left side.       Bicep reflexes are 2+ on the right side and 2+ on the left side.       Brachioradialis reflexes are 2+ on the right side and 2+ on the left side.       Patellar reflexes are 1+ on the right side and 1+ on the left side.       Achilles reflexes are 1+ on the right side and 1+ on the left side.  Psychiatric: Her speech is normal and behavior is normal. Judgment and thought content  normal. Her mood appears anxious. Cognition and memory are normal.     Assessment/Plan:       Alaina was seen today for tremors.    Diagnoses and all orders for this visit:    Microangiopathy (CMS/HCC)  Comments:  aspirin daily recommended low dose as well as continue folic acid. takes asa about 2-3 times a week currently.    Tremor  Comments:  monitor. ET.    Neuropathy  Comments:  better with accupuncture and prefers conservative management only. denies pain.    Right-sided Bell's palsy  Comments:  continue home PT exercises    Blepharospasm  Comments:  Keep appointment with Dr. Pino at  for Botox injections           Continue monitoring. F/U with Neuro PRN. Reviewed medications, potential side effects and signs and symptoms to report. Discussed risk versus benefits of treatment plan with patient and/or family-including medications, labs and radiology that may be ordered. Addressed questions and concerns during visit. Patient and/or family verbalized understanding and agree with plan.    EMR Dragon/Transcription Disclaimer:  Much of this encounter note is an electronic transcription of spoken language to printed text. Electronic transcription of spoken language may permit erroneous words or phrases to be inadvertently transcribed. Although I have reviewed the note for such errors, some may still exist in this documentation.      Sera Macedo, APRN  7/18/2019

## 2019-09-05 ENCOUNTER — TRANSCRIBE ORDERS (OUTPATIENT)
Dept: LAB | Facility: HOSPITAL | Age: 64
End: 2019-09-05

## 2019-09-05 ENCOUNTER — LAB (OUTPATIENT)
Dept: LAB | Facility: HOSPITAL | Age: 64
End: 2019-09-05

## 2019-09-05 DIAGNOSIS — L08.0 PYODERMA: Primary | ICD-10-CM

## 2019-09-05 DIAGNOSIS — L08.0 PYODERMA: ICD-10-CM

## 2019-09-05 PROCEDURE — 87070 CULTURE OTHR SPECIMN AEROBIC: CPT

## 2019-09-05 PROCEDURE — 87205 SMEAR GRAM STAIN: CPT

## 2019-09-08 LAB
BACTERIA SPEC AEROBE CULT: NORMAL
GRAM STN SPEC: NORMAL

## 2021-01-06 ENCOUNTER — APPOINTMENT (OUTPATIENT)
Dept: MRI IMAGING | Facility: HOSPITAL | Age: 66
End: 2021-01-06

## 2021-01-06 ENCOUNTER — HOSPITAL ENCOUNTER (EMERGENCY)
Facility: HOSPITAL | Age: 66
Discharge: HOME | End: 2021-01-06
Attending: EMERGENCY MEDICINE | Admitting: EMERGENCY MEDICINE

## 2021-01-06 ENCOUNTER — APPOINTMENT (OUTPATIENT)
Dept: GENERAL RADIOLOGY | Facility: HOSPITAL | Age: 66
End: 2021-01-06

## 2021-01-06 VITALS
SYSTOLIC BLOOD PRESSURE: 172 MMHG | TEMPERATURE: 98.5 F | BODY MASS INDEX: 26.12 KG/M2 | RESPIRATION RATE: 18 BRPM | WEIGHT: 153 LBS | HEART RATE: 81 BPM | OXYGEN SATURATION: 97 % | DIASTOLIC BLOOD PRESSURE: 92 MMHG | HEIGHT: 64 IN

## 2021-01-06 DIAGNOSIS — R55 NEAR SYNCOPE: ICD-10-CM

## 2021-01-06 DIAGNOSIS — R42 DIZZINESS: Primary | ICD-10-CM

## 2021-01-06 LAB
ALBUMIN SERPL-MCNC: 4 G/DL (ref 3.5–5.2)
ALBUMIN/GLOB SERPL: 1.2 G/DL
ALP SERPL-CCNC: 61 U/L (ref 39–117)
ALT SERPL W P-5'-P-CCNC: 29 U/L (ref 1–33)
ANION GAP SERPL CALCULATED.3IONS-SCNC: 10 MMOL/L (ref 5–15)
AST SERPL-CCNC: 21 U/L (ref 1–32)
BACTERIA UR QL AUTO: NORMAL /HPF
BASOPHILS # BLD AUTO: 0.03 10*3/MM3 (ref 0–0.2)
BASOPHILS NFR BLD AUTO: 0.5 % (ref 0–1.5)
BILIRUB SERPL-MCNC: 0.4 MG/DL (ref 0–1.2)
BILIRUB UR QL STRIP: NEGATIVE
BUN SERPL-MCNC: 14 MG/DL (ref 8–23)
BUN/CREAT SERPL: 23.3 (ref 7–25)
CALCIUM SPEC-SCNC: 8.9 MG/DL (ref 8.6–10.5)
CHLORIDE SERPL-SCNC: 104 MMOL/L (ref 98–107)
CLARITY UR: CLEAR
CO2 SERPL-SCNC: 23 MMOL/L (ref 22–29)
COLOR UR: YELLOW
CREAT SERPL-MCNC: 0.6 MG/DL (ref 0.57–1)
DEPRECATED RDW RBC AUTO: 42.5 FL (ref 37–54)
EOSINOPHIL # BLD AUTO: 0.08 10*3/MM3 (ref 0–0.4)
EOSINOPHIL NFR BLD AUTO: 1.3 % (ref 0.3–6.2)
ERYTHROCYTE [DISTWIDTH] IN BLOOD BY AUTOMATED COUNT: 12.2 % (ref 12.3–15.4)
GFR SERPL CREATININE-BSD FRML MDRD: 100 ML/MIN/1.73
GLOBULIN UR ELPH-MCNC: 3.4 GM/DL
GLUCOSE SERPL-MCNC: 100 MG/DL (ref 65–99)
GLUCOSE UR STRIP-MCNC: NEGATIVE MG/DL
HCT VFR BLD AUTO: 46 % (ref 34–46.6)
HGB BLD-MCNC: 15.3 G/DL (ref 12–15.9)
HGB UR QL STRIP.AUTO: NEGATIVE
HOLD SPECIMEN: NORMAL
HOLD SPECIMEN: NORMAL
HYALINE CASTS UR QL AUTO: NORMAL /LPF
IMM GRANULOCYTES # BLD AUTO: 0.02 10*3/MM3 (ref 0–0.05)
IMM GRANULOCYTES NFR BLD AUTO: 0.3 % (ref 0–0.5)
INR PPP: 1.01 (ref 0.85–1.16)
KETONES UR QL STRIP: NEGATIVE
LEUKOCYTE ESTERASE UR QL STRIP.AUTO: ABNORMAL
LIPASE SERPL-CCNC: 23 U/L (ref 13–60)
LYMPHOCYTES # BLD AUTO: 1.48 10*3/MM3 (ref 0.7–3.1)
LYMPHOCYTES NFR BLD AUTO: 24.7 % (ref 19.6–45.3)
MCH RBC QN AUTO: 31.7 PG (ref 26.6–33)
MCHC RBC AUTO-ENTMCNC: 33.3 G/DL (ref 31.5–35.7)
MCV RBC AUTO: 95.2 FL (ref 79–97)
MONOCYTES # BLD AUTO: 0.38 10*3/MM3 (ref 0.1–0.9)
MONOCYTES NFR BLD AUTO: 6.4 % (ref 5–12)
NEUTROPHILS NFR BLD AUTO: 3.99 10*3/MM3 (ref 1.7–7)
NEUTROPHILS NFR BLD AUTO: 66.8 % (ref 42.7–76)
NITRITE UR QL STRIP: NEGATIVE
NRBC BLD AUTO-RTO: 0 /100 WBC (ref 0–0.2)
NT-PROBNP SERPL-MCNC: 123.2 PG/ML (ref 0–900)
PH UR STRIP.AUTO: 7.5 [PH] (ref 5–8)
PLATELET # BLD AUTO: 266 10*3/MM3 (ref 140–450)
PMV BLD AUTO: 9.5 FL (ref 6–12)
POTASSIUM SERPL-SCNC: 3.9 MMOL/L (ref 3.5–5.2)
PROT SERPL-MCNC: 7.4 G/DL (ref 6–8.5)
PROT UR QL STRIP: NEGATIVE
PROTHROMBIN TIME: 13 SECONDS (ref 11.5–14)
QT INTERVAL: 380 MS
QTC INTERVAL: 443 MS
RBC # BLD AUTO: 4.83 10*6/MM3 (ref 3.77–5.28)
RBC # UR: NORMAL /HPF
REF LAB TEST METHOD: NORMAL
SODIUM SERPL-SCNC: 137 MMOL/L (ref 136–145)
SP GR UR STRIP: 1.01 (ref 1–1.03)
SQUAMOUS #/AREA URNS HPF: NORMAL /HPF
TROPONIN T SERPL-MCNC: <0.01 NG/ML (ref 0–0.03)
TROPONIN T SERPL-MCNC: <0.01 NG/ML (ref 0–0.03)
UROBILINOGEN UR QL STRIP: ABNORMAL
WBC # BLD AUTO: 5.98 10*3/MM3 (ref 3.4–10.8)
WBC UR QL AUTO: NORMAL /HPF
WHOLE BLOOD HOLD SPECIMEN: NORMAL
WHOLE BLOOD HOLD SPECIMEN: NORMAL

## 2021-01-06 PROCEDURE — 70553 MRI BRAIN STEM W/O & W/DYE: CPT

## 2021-01-06 PROCEDURE — 83690 ASSAY OF LIPASE: CPT | Performed by: EMERGENCY MEDICINE

## 2021-01-06 PROCEDURE — 93005 ELECTROCARDIOGRAM TRACING: CPT | Performed by: EMERGENCY MEDICINE

## 2021-01-06 PROCEDURE — 70548 MR ANGIOGRAPHY NECK W/DYE: CPT

## 2021-01-06 PROCEDURE — 85610 PROTHROMBIN TIME: CPT | Performed by: EMERGENCY MEDICINE

## 2021-01-06 PROCEDURE — 84484 ASSAY OF TROPONIN QUANT: CPT | Performed by: EMERGENCY MEDICINE

## 2021-01-06 PROCEDURE — 96361 HYDRATE IV INFUSION ADD-ON: CPT

## 2021-01-06 PROCEDURE — A9577 INJ MULTIHANCE: HCPCS | Performed by: EMERGENCY MEDICINE

## 2021-01-06 PROCEDURE — 96360 HYDRATION IV INFUSION INIT: CPT

## 2021-01-06 PROCEDURE — 70544 MR ANGIOGRAPHY HEAD W/O DYE: CPT

## 2021-01-06 PROCEDURE — 99285 EMERGENCY DEPT VISIT HI MDM: CPT

## 2021-01-06 PROCEDURE — 83880 ASSAY OF NATRIURETIC PEPTIDE: CPT | Performed by: EMERGENCY MEDICINE

## 2021-01-06 PROCEDURE — 71045 X-RAY EXAM CHEST 1 VIEW: CPT

## 2021-01-06 PROCEDURE — 85025 COMPLETE CBC W/AUTO DIFF WBC: CPT | Performed by: EMERGENCY MEDICINE

## 2021-01-06 PROCEDURE — 0 GADOBENATE DIMEGLUMINE 529 MG/ML SOLUTION: Performed by: EMERGENCY MEDICINE

## 2021-01-06 PROCEDURE — 80053 COMPREHEN METABOLIC PANEL: CPT | Performed by: EMERGENCY MEDICINE

## 2021-01-06 PROCEDURE — 81001 URINALYSIS AUTO W/SCOPE: CPT | Performed by: EMERGENCY MEDICINE

## 2021-01-06 RX ORDER — MECLIZINE HYDROCHLORIDE 25 MG/1
25 TABLET ORAL ONCE
Status: COMPLETED | OUTPATIENT
Start: 2021-01-06 | End: 2021-01-06

## 2021-01-06 RX ORDER — LOSARTAN POTASSIUM 25 MG/1
25 TABLET ORAL
Status: DISCONTINUED | OUTPATIENT
Start: 2021-01-06 | End: 2021-01-06 | Stop reason: HOSPADM

## 2021-01-06 RX ORDER — MECLIZINE HYDROCHLORIDE 25 MG/1
25 TABLET ORAL 3 TIMES DAILY PRN
Qty: 20 TABLET | Refills: 0 | Status: SHIPPED | OUTPATIENT
Start: 2021-01-06 | End: 2021-02-17

## 2021-01-06 RX ORDER — LOSARTAN POTASSIUM 25 MG/1
50 TABLET ORAL DAILY
Qty: 60 TABLET | Refills: 0 | Status: SHIPPED | OUTPATIENT
Start: 2021-01-06 | End: 2021-01-14 | Stop reason: HOSPADM

## 2021-01-06 RX ORDER — SODIUM CHLORIDE 0.9 % (FLUSH) 0.9 %
10 SYRINGE (ML) INJECTION AS NEEDED
Status: DISCONTINUED | OUTPATIENT
Start: 2021-01-06 | End: 2021-01-06 | Stop reason: HOSPADM

## 2021-01-06 RX ORDER — ROSUVASTATIN CALCIUM 10 MG/1
10 TABLET, COATED ORAL DAILY
COMMUNITY

## 2021-01-06 RX ORDER — LOSARTAN POTASSIUM 25 MG/1
25 TABLET ORAL DAILY
COMMUNITY
End: 2021-01-06 | Stop reason: SDUPTHER

## 2021-01-06 RX ORDER — SODIUM CHLORIDE 9 MG/ML
125 INJECTION, SOLUTION INTRAVENOUS CONTINUOUS
Status: DISCONTINUED | OUTPATIENT
Start: 2021-01-06 | End: 2021-01-06 | Stop reason: HOSPADM

## 2021-01-06 RX ADMIN — GADOBENATE DIMEGLUMINE 14 ML: 529 INJECTION, SOLUTION INTRAVENOUS at 12:40

## 2021-01-06 RX ADMIN — SODIUM CHLORIDE 500 ML: 9 INJECTION, SOLUTION INTRAVENOUS at 11:21

## 2021-01-06 RX ADMIN — SODIUM CHLORIDE 125 ML/HR: 9 INJECTION, SOLUTION INTRAVENOUS at 14:14

## 2021-01-06 RX ADMIN — LOSARTAN POTASSIUM 25 MG: 25 TABLET, FILM COATED ORAL at 14:32

## 2021-01-06 RX ADMIN — MECLIZINE HYDROCHLORIDE 25 MG: 25 TABLET ORAL at 15:26

## 2021-01-06 NOTE — ED PROVIDER NOTES
Subjective   Alaina Kennedy is a 65 y.o. female who presents to the ED with c/o near-syncope. The patient reports that shortly after awaking at 0700 she had an episode of dizziness and near-syncope. The episode lasted for approximately 1 minute and she experienced generalized imbalance without vertigo or imbalance. Her symptoms resolved but it prompted her to take a blood pressure reading, which was 163/83. She proceeded to go to her orthopedic surgeon's office for a follow-up appointment on her post-op left thumb, but while sitting in the waiting room she had a second episode of dizziness and imbalance, prompting her to present to the ED for evaluation. The patient reports that there has not been any room-spinning during these episodes. She denies shortness of breath, chest pain, palpitations, nausea, vomiting, loss of taste or smell, myalgias, sore throat, cough, congestion, numbness, weakness, and changes in speech or vision. The patient reports having no exposure to suspected or confirmed COVID-19 individuals. She has a past medical history of rheumatoid arthritis, neuropathy, and Bell's palsy. Her surgical history includes hysterectomy and cholecystectomy. There are no other acute complaints at this time.      History provided by:  Patient and EMS personnel  Syncope  Episode history:  Multiple  Most recent episode:  Today  Duration:  1 minute  Timing:  Intermittent  Progression:  Unchanged  Chronicity:  New  Context: normal activity    Witnessed: no    Relieved by:  None tried  Worsened by:  Nothing  Ineffective treatments:  None tried  Associated symptoms: no chest pain, no fever, no nausea, no palpitations, no shortness of breath, no vomiting and no weakness    Risk factors: no congenital heart disease, no coronary artery disease and no seizures        Review of Systems   Constitutional: Negative for chills and fever.        The patient complains of hypertensive blood pressure readings.   HENT: Negative for  congestion and sore throat.         She denies loss of taste or smell.   Eyes: Negative for visual disturbance.        The patient denies any visual changes.   Respiratory: Negative for cough and shortness of breath.    Cardiovascular: Positive for syncope. Negative for chest pain and palpitations.   Gastrointestinal: Negative for nausea and vomiting.   Musculoskeletal: Negative for myalgias.   Neurological: Negative for speech difficulty, weakness and numbness.        The patient complains of near-syncope and imbalance. She denies vertigo or room-spinning.   All other systems reviewed and are negative.      Past Medical History:   Diagnosis Date   • Arthritis    • Bell's palsy    • Hyperlipidemia    • Hypertension    • Neuropathy    • Rheumatoid arthritis (CMS/HCC)        Allergies   Allergen Reactions   • Quinolones    • Sulfa Antibiotics Rash       Past Surgical History:   Procedure Laterality Date   • BACK SURGERY     • CHOLECYSTECTOMY     • FOOT SURGERY     • HYSTERECTOMY     • KNEE SURGERY     • TOTAL HIP ARTHROPLASTY         Family History   Problem Relation Age of Onset   • Cancer Father    • Cancer Maternal Grandmother    • Stroke Maternal Grandfather        Social History     Socioeconomic History   • Marital status:      Spouse name: Not on file   • Number of children: Not on file   • Years of education: Not on file   • Highest education level: Not on file   Tobacco Use   • Smoking status: Never Smoker   Substance and Sexual Activity   • Alcohol use: No   • Drug use: No   • Sexual activity: Defer         Objective   Physical Exam  Vitals signs and nursing note reviewed.   Constitutional:       General: She is not in acute distress.     Appearance: She is well-developed.   HENT:      Head: Normocephalic and atraumatic.   Eyes:      General: No scleral icterus.     Extraocular Movements:      Right eye: Nystagmus present.      Left eye: Nystagmus present.      Conjunctiva/sclera: Conjunctivae normal.       Comments: Left directional nystagmus.   Neck:      Musculoskeletal: Normal range of motion and neck supple.   Cardiovascular:      Rate and Rhythm: Normal rate and regular rhythm.      Heart sounds: Normal heart sounds. No murmur. No friction rub. No gallop.    Pulmonary:      Effort: Pulmonary effort is normal. No respiratory distress.      Breath sounds: Normal breath sounds.      Comments: Lungs are clear to auscultation bilaterally.  Abdominal:      Palpations: Abdomen is soft.      Tenderness: There is no abdominal tenderness.   Musculoskeletal: Normal range of motion.      Comments: Post-operative changes in the left thumb.   Skin:     General: Skin is warm and dry.   Neurological:      General: No focal deficit present.      Mental Status: She is alert and oriented to person, place, and time. Mental status is at baseline.      Sensory: Sensation is intact. No sensory deficit.      Motor: Motor function is intact. No weakness.      Comments: Normal neurological exam.   are intact bilaterally.  Strength is intact bilaterally.   Psychiatric:         Behavior: Behavior normal.         Procedures         ED Course  ED Course as of Jan 06 1555   Wed Jan 06, 2021   1513 Is serially reevaluated throughout the ED course.  TMs are unremarkable bilaterally.  Rapid head shake and reclining yields no enhanced nystagmus left or right.  She did not feel dizzy with the maneuver.  Second ECG reveals sinus rhythm with a rate of 82.  There is a single PAC with superimposed PVC versus aberrant conduction on that single PAC.  ECG is otherwise unchanged.  Laboratory evaluation is unremarkable with 2 - troponins.  Normal white count hemoglobin and hematocrit    [HH]   1530 Patient serially reevaluated throughout the ED course with last reevaluation now.  Blood pressure is 157/83 on recheck.  I will treat her with an initial 25 mg of Cozaar and will increase the Cozaar to 50Patient denies any dizziness currently.  2 sets  of enzymes are negative.  Neurologic exam is unremarkable with a normal MRI and MRAI discussed follow-up of the cardiology syncope clinic in view of the recurrent episodes of dizziness.  We discussed follow-up with PCP within the next week as well as indications for return to the ED including recurrent significant symptoms, syncope or significant presyncope or any neurologic symptomsVery pleasant responsible patient and supportive spouse verbalized understanding agreement plan of care, need for follow-up, and indications for immediate return.    []   1540 I personally viewed the MRI and radiograph   Anion Gap: 10.0 []   1554 I discussed case with Dr. Suarez who will follow-up the patient in the office    []      ED Course User Index  [] Chi Bradley MD     Recent Results (from the past 24 hour(s))   ECG 12 Lead    Collection Time: 01/06/21 10:47 AM   Result Value Ref Range    QT Interval 380 ms    QTC Interval 443 ms   Comprehensive Metabolic Panel    Collection Time: 01/06/21 11:17 AM    Specimen: Blood   Result Value Ref Range    Glucose 100 (H) 65 - 99 mg/dL    BUN 14 8 - 23 mg/dL    Creatinine 0.60 0.57 - 1.00 mg/dL    Sodium 137 136 - 145 mmol/L    Potassium 3.9 3.5 - 5.2 mmol/L    Chloride 104 98 - 107 mmol/L    CO2 23.0 22.0 - 29.0 mmol/L    Calcium 8.9 8.6 - 10.5 mg/dL    Total Protein 7.4 6.0 - 8.5 g/dL    Albumin 4.00 3.50 - 5.20 g/dL    ALT (SGPT) 29 1 - 33 U/L    AST (SGOT) 21 1 - 32 U/L    Alkaline Phosphatase 61 39 - 117 U/L    Total Bilirubin 0.4 0.0 - 1.2 mg/dL    eGFR Non African Amer 100 >60 mL/min/1.73    Globulin 3.4 gm/dL    A/G Ratio 1.2 g/dL    BUN/Creatinine Ratio 23.3 7.0 - 25.0    Anion Gap 10.0 5.0 - 15.0 mmol/L   Protime-INR    Collection Time: 01/06/21 11:17 AM    Specimen: Blood   Result Value Ref Range    Protime 13.0 11.5 - 14.0 Seconds    INR 1.01 0.85 - 1.16   Troponin    Collection Time: 01/06/21 11:17 AM    Specimen: Blood   Result Value Ref Range    Troponin T <0.010  0.000 - 0.030 ng/mL   BNP    Collection Time: 01/06/21 11:17 AM    Specimen: Blood   Result Value Ref Range    proBNP 123.2 0.0 - 900.0 pg/mL   Lipase    Collection Time: 01/06/21 11:17 AM    Specimen: Blood   Result Value Ref Range    Lipase 23 13 - 60 U/L   Light Blue Top    Collection Time: 01/06/21 11:17 AM   Result Value Ref Range    Extra Tube hold for add-on    Green Top (Gel)    Collection Time: 01/06/21 11:17 AM   Result Value Ref Range    Extra Tube Hold for add-ons.    Lavender Top    Collection Time: 01/06/21 11:17 AM   Result Value Ref Range    Extra Tube hold for add-on    Gold Top - SST    Collection Time: 01/06/21 11:17 AM   Result Value Ref Range    Extra Tube Hold for add-ons.    CBC Auto Differential    Collection Time: 01/06/21 11:17 AM    Specimen: Blood   Result Value Ref Range    WBC 5.98 3.40 - 10.80 10*3/mm3    RBC 4.83 3.77 - 5.28 10*6/mm3    Hemoglobin 15.3 12.0 - 15.9 g/dL    Hematocrit 46.0 34.0 - 46.6 %    MCV 95.2 79.0 - 97.0 fL    MCH 31.7 26.6 - 33.0 pg    MCHC 33.3 31.5 - 35.7 g/dL    RDW 12.2 (L) 12.3 - 15.4 %    RDW-SD 42.5 37.0 - 54.0 fl    MPV 9.5 6.0 - 12.0 fL    Platelets 266 140 - 450 10*3/mm3    Neutrophil % 66.8 42.7 - 76.0 %    Lymphocyte % 24.7 19.6 - 45.3 %    Monocyte % 6.4 5.0 - 12.0 %    Eosinophil % 1.3 0.3 - 6.2 %    Basophil % 0.5 0.0 - 1.5 %    Immature Grans % 0.3 0.0 - 0.5 %    Neutrophils, Absolute 3.99 1.70 - 7.00 10*3/mm3    Lymphocytes, Absolute 1.48 0.70 - 3.10 10*3/mm3    Monocytes, Absolute 0.38 0.10 - 0.90 10*3/mm3    Eosinophils, Absolute 0.08 0.00 - 0.40 10*3/mm3    Basophils, Absolute 0.03 0.00 - 0.20 10*3/mm3    Immature Grans, Absolute 0.02 0.00 - 0.05 10*3/mm3    nRBC 0.0 0.0 - 0.2 /100 WBC   Urinalysis With Microscopic If Indicated (No Culture) - Urine, Clean Catch    Collection Time: 01/06/21 11:37 AM    Specimen: Urine, Clean Catch   Result Value Ref Range    Color, UA Yellow Yellow, Straw    Appearance, UA Clear Clear    pH, UA 7.5 5.0 - 8.0     Specific Gravity, UA 1.007 1.001 - 1.030    Glucose, UA Negative Negative    Ketones, UA Negative Negative    Bilirubin, UA Negative Negative    Blood, UA Negative Negative    Protein, UA Negative Negative    Leuk Esterase, UA Moderate (2+) (A) Negative    Nitrite, UA Negative Negative    Urobilinogen, UA 0.2 E.U./dL 0.2 - 1.0 E.U./dL   Urinalysis, Microscopic Only - Urine, Clean Catch    Collection Time: 01/06/21 11:37 AM    Specimen: Urine, Clean Catch   Result Value Ref Range    RBC, UA None Seen None Seen, 0-2 /HPF    WBC, UA 0-2 None Seen, 0-2 /HPF    Bacteria, UA Trace None Seen, Trace /HPF    Squamous Epithelial Cells, UA 0-2 None Seen, 0-2 /HPF    Hyaline Casts, UA None Seen 0 - 6 /LPF    Methodology Automated Microscopy    Troponin    Collection Time: 01/06/21  2:11 PM    Specimen: Blood   Result Value Ref Range    Troponin T <0.010 0.000 - 0.030 ng/mL     Note: In addition to lab results from this visit, the labs listed above may include labs taken at another facility or during a different encounter within the last 24 hours. Please correlate lab times with ED admission and discharge times for further clarification of the services performed during this visit.    MRI Angiogram Head Without Contrast   Preliminary Result   Chronic changes seen within the periventricular and   subcortical white matter with no abnormal contrast enhancement. No acute   intracranial abnormality. Unremarkable MRA of the head and neck.        D:  01/06/2021   E:  01/06/2021          MRI Angiogram Neck With Contrast   Preliminary Result   Chronic changes seen within the periventricular and   subcortical white matter with no abnormal contrast enhancement. No acute   intracranial abnormality. Unremarkable MRA of the head and neck.        D:  01/06/2021   E:  01/06/2021          MRI Brain With & Without Contrast   Preliminary Result   Chronic changes seen within the periventricular and   subcortical white matter with no abnormal  contrast enhancement. No acute   intracranial abnormality. Unremarkable MRA of the head and neck.        D:  01/06/2021   E:  01/06/2021          XR Chest 1 View   Preliminary Result   No acute cardiopulmonary disease.       D:  01/06/2021   E:  01/06/2021            Vitals:    01/06/21 1330 01/06/21 1430 01/06/21 1432 01/06/21 1500   BP: 159/93 165/97 165/97 157/83   Patient Position:       Pulse: 89 89 90    Resp:   18 18   Temp:       TempSrc:       SpO2: 97% 95% 98% 98%   Weight:       Height:         Medications   sodium chloride 0.9 % flush 10 mL (has no administration in time range)   sodium chloride 0.9 % infusion (125 mL/hr Intravenous New Bag 1/6/21 1414)   losartan (COZAAR) tablet 25 mg (25 mg Oral Given 1/6/21 1432)   sodium chloride 0.9 % bolus 500 mL (0 mL Intravenous Stopped 1/6/21 1433)   gadobenate dimeglumine (MULTIHANCE) injection 14 mL (14 mL Intravenous Given 1/6/21 1240)   meclizine (ANTIVERT) tablet 25 mg (25 mg Oral Given 1/6/21 1526)     ECG/EMG Results (last 24 hours)     Procedure Component Value Units Date/Time    ECG 12 Lead [[id number]] Collected: 01/06/21 1047     Updated: 01/06/21 1049        ECG 12 Lead   Final Result   Test Reason : Stroke Protocol (Onset > 12 Hrs)   Blood Pressure : **/** mmHG   Vent. Rate : 082 BPM     Atrial Rate : 082 BPM      P-R Int : 150 ms          QRS Dur : 076 ms       QT Int : 380 ms       P-R-T Axes : 026 -15 002 degrees      QTc Int : 443 ms      Normal sinus rhythm   Minimal voltage criteria for LVH, may be normal variant   Cannot rule out Anterior infarct , age undetermined   Abnormal ECG   When compared with ECG of 10-JUL-2012 17:10,   Minimal criteria for Anterior infarct are now present   Confirmed by MARCELLO YBARRA MD ([id number]) on 1/6/2021 11:29:17 AM      Referred By:  EDMD           Confirmed By:MARCELLO YBARRA MD      ECG 12 Lead    (Results Pending)                                        DISCHARGE    Patient discharged in stable condition.    Reviewed  implications of results, diagnosis, meds, responsibility to follow up, warning signs and symptoms of possible worsening, potential complications and reasons to return to ER.    Patient/Family voiced understanding of above instructions.    Discussed plan for discharge, as there is no emergent indication for admission.  Pt/family is agreeable and understands need for follow up and possible repeat testing.  Pt/family is aware that discharge does not mean that nothing is wrong but that it indicates no emergency is currently present that requires admission and they must continue care with follow-up as given below or with a physician of their choice.     FOLLOW-UP  [facility]  [address]  [place] [id number] [phone]        Ilda Suarez MD  [address]  [address]  [facility] [id number] [phone]               Medication List      New Prescriptions    meclizine 25 MG tablet  Commonly known as: ANTIVERT  Take 1 tablet by mouth 3 (Three) Times a Day As Needed for Dizziness.        Changed    losartan 25 MG tablet  Commonly known as: COZAAR  Take 2 tablets by mouth Daily.  What changed: how much to take           Where to Get Your Medications      These medications were sent to [facility]/pharmacy #[id number] - [place], [place] - [address] - [phone]  - [phone] FX  [address] [facility] [address]    Hours: 24-hours Phone: [phone]   · losartan 25 MG tablet  · meclizine 25 MG tablet             MDM    Final diagnoses:   Dizziness   Near syncope       Documentation assistance provided by london Gordon.  Information recorded by the london was done at my direction and has been verified and validated by me.     James Gordon  01/06/21 105       Chi Bradley MD  01/06/21 8253       Chi Bradley MD  01/06/21 5027

## 2021-01-06 NOTE — DISCHARGE INSTRUCTIONS
Rest and stay very well-hydrated    Increase your Cozaar to 2 tablets daily    Blood pressure checks twice a day.  Please keep a log and bring your blood pressure log to your PCP.  If your blood pressure systolic is less than 120, decrease your Cozaar to 1 tablet daily    You may take the Antivert if you have vertigo which you have had in the past    Talk to Dr. Gerard since you missed the appointment today in rheumatology.  Ask him if it safe for you to take a coated baby aspirin a day.  If he agrees start a coated baby aspirin daily.  This does interact with the methotrexate however    Recheck with Dr. Suarez in the office within the next week    Follow-up at the cardiology syncope clinic as arranged today.  They should call you by noon tomorrow.  If they do not call by noon please call them    Return to the ED at once if you have any acute urgent emergent or significant symptoms as discussed including any syncope, near syncope, strokelike symptoms or other symptoms as discussed

## 2021-01-07 LAB
QT INTERVAL: 402 MS
QTC INTERVAL: 469 MS

## 2021-01-08 ENCOUNTER — TELEMEDICINE (OUTPATIENT)
Dept: CARDIOLOGY | Facility: HOSPITAL | Age: 66
End: 2021-01-08

## 2021-01-08 ENCOUNTER — HOSPITAL ENCOUNTER (OUTPATIENT)
Dept: CARDIOLOGY | Facility: HOSPITAL | Age: 66
Discharge: HOME | End: 2021-01-08
Admitting: NURSE PRACTITIONER

## 2021-01-08 VITALS
WEIGHT: 152 LBS | DIASTOLIC BLOOD PRESSURE: 80 MMHG | SYSTOLIC BLOOD PRESSURE: 127 MMHG | BODY MASS INDEX: 25.95 KG/M2 | HEART RATE: 79 BPM | HEIGHT: 64 IN

## 2021-01-08 DIAGNOSIS — I10 ESSENTIAL HYPERTENSION: ICD-10-CM

## 2021-01-08 DIAGNOSIS — E78.2 MIXED HYPERLIPIDEMIA: ICD-10-CM

## 2021-01-08 DIAGNOSIS — R42 DIZZINESS: ICD-10-CM

## 2021-01-08 DIAGNOSIS — R55 NEAR SYNCOPE: ICD-10-CM

## 2021-01-08 DIAGNOSIS — R42 DIZZINESS: Primary | ICD-10-CM

## 2021-01-08 DIAGNOSIS — R00.2 PALPITATIONS: ICD-10-CM

## 2021-01-08 PROCEDURE — 93246 EXT ECG>7D<15D RECORDING: CPT

## 2021-01-08 PROCEDURE — 99214 OFFICE O/P EST MOD 30 MIN: CPT | Performed by: NURSE PRACTITIONER

## 2021-01-08 NOTE — PROGRESS NOTES
"[facility]    This was an audio and video enabled telemedicine encounter.    Chief Complaint  Establish Care, Dizziness, and Syncope    Subjective    History of Present Illness {CC  Problem List  Visit  Diagnosis   Encounters  Notes  Medications  Labs  Result Review Imaging  Media :23}     Alaina Kennedy presents to [facility] for   History of Present Illness     69-year-old female presented to [facility] ED on 1/6/2021 with near syncope.  Shortly after waking at 7 AM she experienced dizziness and near syncope.  Stated episode lasted approximately 1 minute.  Followed by feelings of imbalance.  Symptoms resolved and patient took her blood pressure which was 163/80..  Recently postop of left thumb surgery.  Presented to her orthopedic surgeon's office where she had another episode of dizziness and imbalance.  Denies shortness of breath, chest pain, pressure, palpitations, nausea, vomiting, diaphoresis, paresthesias, weakness.  Denies room spinning.  Past medical history of RA, neuropathy, Bell's palsy.    Patient was started on Cozaar for elevated blood pressure.    Pt denies CP, pressure, dyspnea.  Mild dyspnea due to being sedentary due to hip pain.  Plan to be evaluated for hip sx in near future. No edema, PND/orthopnea.     Objective     Vital Signs:   Vitals:    01/08/21 1518   BP: 127/80   BP Location: Left arm   Patient Position: Sitting   Pulse: 79   Weight: 68.9 kg (152 lb)   Height: 162.6 cm (64\")     Body mass index is 26.09 kg/m².  Physical Exam  Vitals signs reviewed.   Constitutional:       General: She is not in acute distress.  Pulmonary:      Effort: Pulmonary effort is normal.   Skin:     Coloration: Skin is not pale.   Neurological:      Mental Status: She is alert.   Psychiatric:         Mood and Affect: Mood normal.         Behavior: Behavior normal. Behavior is cooperative.      "         Result Review  Data Reviewed:{ Labs  Result Review  Imaging  Med Tab  Media :23}     EKG 1/7/2021: Sinus rhythm with PAC 82 bpm  XR Chest 1 View (01/06/2021 11:30)    MRI Brain With & Without Contrast (01/06/2021 12:38)  MRI Angiogram Head Without Contrast (01/06/2021 12:39)      Admission on 01/06/2021, Discharged on 01/06/2021   Component Date Value Ref Range Status   • QT Interval 01/06/2021 380  ms Final   • QTC Interval 01/06/2021 443  ms Final   • Color, UA 01/06/2021 Yellow  Yellow, Straw Final   • Appearance, UA 01/06/2021 Clear  Clear Final   • pH, UA 01/06/2021 7.5  5.0 - 8.0 Final   • Specific Gravity, UA 01/06/2021 1.007  1.001 - 1.030 Final   • Glucose, UA 01/06/2021 Negative  Negative Final   • Ketones, UA 01/06/2021 Negative  Negative Final   • Bilirubin, UA 01/06/2021 Negative  Negative Final   • Blood, UA 01/06/2021 Negative  Negative Final   • Protein, UA 01/06/2021 Negative  Negative Final   • Leuk Esterase, UA 01/06/2021 Moderate (2+)* Negative Final   • Nitrite, UA 01/06/2021 Negative  Negative Final   • Urobilinogen, UA 01/06/2021 0.2 E.U./dL  0.2 - 1.0 E.U./dL Final   • Glucose 01/06/2021 100* 65 - 99 mg/dL Final   • BUN 01/06/2021 14  8 - 23 mg/dL Final   • Creatinine 01/06/2021 0.60  0.57 - 1.00 mg/dL Final   • Sodium 01/06/2021 137  136 - 145 mmol/L Final   • Potassium 01/06/2021 3.9  3.5 - 5.2 mmol/L Final   • Chloride 01/06/2021 104  98 - 107 mmol/L Final   • CO2 01/06/2021 23.0  22.0 - 29.0 mmol/L Final   • Calcium 01/06/2021 8.9  8.6 - 10.5 mg/dL Final   • Total Protein 01/06/2021 7.4  6.0 - 8.5 g/dL Final   • Albumin 01/06/2021 4.00  3.50 - 5.20 g/dL Final   • ALT (SGPT) 01/06/2021 29  1 - 33 U/L Final   • AST (SGOT) 01/06/2021 21  1 - 32 U/L Final   • Alkaline Phosphatase 01/06/2021 61  39 - 117 U/L Final   • Total Bilirubin 01/06/2021 0.4  0.0 - 1.2 mg/dL Final   • eGFR Non African Amer 01/06/2021 100  >60 mL/min/1.73 Final   • Globulin 01/06/2021 3.4  gm/dL Final   • A/G  Ratio 01/06/2021 1.2  g/dL Final   • BUN/Creatinine Ratio 01/06/2021 23.3  7.0 - 25.0 Final   • Anion Gap 01/06/2021 10.0  5.0 - 15.0 mmol/L Final   • Protime 01/06/2021 13.0  11.5 - 14.0 Seconds Final   • INR 01/06/2021 1.01  0.85 - 1.16 Final   • Troponin T 01/06/2021 <0.010  0.000 - 0.030 ng/mL Final   • proBNP 01/06/2021 123.2  0.0 - 900.0 pg/mL Final   • Lipase 01/06/2021 23  13 - 60 U/L Final   • Extra Tube 01/06/2021 hold for add-on   Final    Auto resulted   • Extra Tube 01/06/2021 Hold for add-ons.   Final    Auto resulted.   • Extra Tube 01/06/2021 hold for add-on   Final    Auto resulted   • Extra Tube 01/06/2021 Hold for add-ons.   Final    Auto resulted.   • WBC 01/06/2021 5.98  3.40 - 10.80 10*3/mm3 Final   • RBC 01/06/2021 4.83  3.77 - 5.28 10*6/mm3 Final   • Hemoglobin 01/06/2021 15.3  12.0 - 15.9 g/dL Final   • Hematocrit 01/06/2021 46.0  34.0 - 46.6 % Final   • MCV 01/06/2021 95.2  79.0 - 97.0 fL Final   • MCH 01/06/2021 31.7  26.6 - 33.0 pg Final   • MCHC 01/06/2021 33.3  31.5 - 35.7 g/dL Final   • RDW 01/06/2021 12.2* 12.3 - 15.4 % Final   • RDW-SD 01/06/2021 42.5  37.0 - 54.0 fl Final   • MPV 01/06/2021 9.5  6.0 - 12.0 fL Final   • Platelets 01/06/2021 266  140 - 450 10*3/mm3 Final   • Neutrophil % 01/06/2021 66.8  42.7 - 76.0 % Final   • Lymphocyte % 01/06/2021 24.7  19.6 - 45.3 % Final   • Monocyte % 01/06/2021 6.4  5.0 - 12.0 % Final   • Eosinophil % 01/06/2021 1.3  0.3 - 6.2 % Final   • Basophil % 01/06/2021 0.5  0.0 - 1.5 % Final   • Immature Grans % 01/06/2021 0.3  0.0 - 0.5 % Final   • Neutrophils, Absolute 01/06/2021 3.99  1.70 - 7.00 10*3/mm3 Final   • Lymphocytes, Absolute 01/06/2021 1.48  0.70 - 3.10 10*3/mm3 Final   • Monocytes, Absolute 01/06/2021 0.38  0.10 - 0.90 10*3/mm3 Final   • Eosinophils, Absolute 01/06/2021 0.08  0.00 - 0.40 10*3/mm3 Final   • Basophils, Absolute 01/06/2021 0.03  0.00 - 0.20 10*3/mm3 Final   • Immature Grans, Absolute 01/06/2021 0.02  0.00 - 0.05 10*3/mm3  Final   • nRBC 01/06/2021 0.0  0.0 - 0.2 /100 WBC Final   • RBC, UA 01/06/2021 None Seen  None Seen, 0-2 /HPF Final   • WBC, UA 01/06/2021 0-2  None Seen, 0-2 /HPF Final   • Bacteria, UA 01/06/2021 Trace  None Seen, Trace /HPF Final   • Squamous Epithelial Cells, UA 01/06/2021 0-2  None Seen, 0-2 /HPF Final   • Hyaline Casts, UA 01/06/2021 None Seen  0 - 6 /LPF Final   • Methodology 01/06/2021 Automated Microscopy   Final   • QT Interval 01/06/2021 402  ms Final   • QTC Interval 01/06/2021 469  ms Final   • Troponin T 01/06/2021 <0.010  0.000 - 0.030 ng/mL Final       No results found for: CHOL, CHLPL  No results found for: TRIG  No results found for: HDL  No results found for: LDL, LDLDIRECT             Assessment and Plan {CC Problem List  Visit Diagnosis  ROS  Review (Popup)  Health Maintenance  Quality  BestPractice  Medications  SmartSets  SnapShot Encounters  Media :23}   1. Dizziness    - Adult Transthoracic Echo Complete W/ Cont if Necessary Per Protocol; Future    - Holter Monitor - 72 Hour Up To 15 Days; Future    2. Near syncope  No carotid stenosis on MRI of neck    - Adult Transthoracic Echo Complete W/ Cont if Necessary Per Protocol; Future  -  - Holter Monitor - 72 Hour Up To 15 Days; Future  - Ambulatory Referral to Cardiology    3. Essential hypertension  BP controlled today    Continue losartan  - Ambulatory Referral to Cardiology    4. Mixed hyperlipidemia  statin  - Duplex Carotid Ultrasound CAR; Future    5. Palpitations    - Adult Transthoracic Echo Complete W/ Cont if Necessary Per Protocol; Future  - Holter Monitor - 72 Hour Up To 15 Days; Future  - Ambulatory Referral to Cardiology      Pt may need cardiac clearance for hip surgery.  Pt to f/u with [facility] for continued management in 6-8 weeks.  [facility].         I spent 15 minutes caring for Alaina on this date of service. This time includes time spent by me in the following activities:preparing for the visit, reviewing  tests, obtaining and/or reviewing a separately obtained history, performing a medically appropriate examination and/or evaluation , counseling and educating the patient/family/caregiver, ordering medications, tests, or procedures and documenting information in the medical record    Follow Up {Instructions Charge Capture  Follow-up Communications :23}   Return if symptoms worsen or fail to improve.      Patient was given instructions and counseling regarding her condition or for health maintenance advice. Please see specific information pulled into the AVS if appropriate.  Patient was instructed to call the [facility] with any questions, concerns, or worsening symptoms.    *Please note that portions of this note were completed with a voice recognition program. Efforts were made to edit the dictations, but occasionally words are mistranscribed.

## 2021-01-11 ENCOUNTER — HOSPITAL ENCOUNTER (EMERGENCY)
Facility: HOSPITAL | Age: 66
Discharge: LEFT BEFORE DISCHARGE | End: 2021-01-11

## 2021-01-11 VITALS
WEIGHT: 151 LBS | RESPIRATION RATE: 18 BRPM | SYSTOLIC BLOOD PRESSURE: 160 MMHG | BODY MASS INDEX: 25.78 KG/M2 | OXYGEN SATURATION: 98 % | DIASTOLIC BLOOD PRESSURE: 97 MMHG | HEIGHT: 64 IN | HEART RATE: 84 BPM | TEMPERATURE: 96.6 F

## 2021-01-11 LAB
ALBUMIN SERPL-MCNC: 3.7 G/DL (ref 3.5–5.2)
ALBUMIN/GLOB SERPL: 1.1 G/DL
ALP SERPL-CCNC: 66 U/L (ref 39–117)
ALT SERPL W P-5'-P-CCNC: 25 U/L (ref 1–33)
ANION GAP SERPL CALCULATED.3IONS-SCNC: 12 MMOL/L (ref 5–15)
AST SERPL-CCNC: 21 U/L (ref 1–32)
BASOPHILS # BLD AUTO: 0.03 10*3/MM3 (ref 0–0.2)
BASOPHILS NFR BLD AUTO: 0.2 % (ref 0–1.5)
BILIRUB SERPL-MCNC: 0.4 MG/DL (ref 0–1.2)
BUN SERPL-MCNC: 11 MG/DL (ref 8–23)
BUN/CREAT SERPL: 17.2 (ref 7–25)
CALCIUM SPEC-SCNC: 8.8 MG/DL (ref 8.6–10.5)
CHLORIDE SERPL-SCNC: 103 MMOL/L (ref 98–107)
CO2 SERPL-SCNC: 20 MMOL/L (ref 22–29)
CREAT SERPL-MCNC: 0.64 MG/DL (ref 0.57–1)
DEPRECATED RDW RBC AUTO: 42.7 FL (ref 37–54)
EOSINOPHIL # BLD AUTO: 0.12 10*3/MM3 (ref 0–0.4)
EOSINOPHIL NFR BLD AUTO: 0.8 % (ref 0.3–6.2)
ERYTHROCYTE [DISTWIDTH] IN BLOOD BY AUTOMATED COUNT: 12.2 % (ref 12.3–15.4)
GFR SERPL CREATININE-BSD FRML MDRD: 93 ML/MIN/1.73
GLOBULIN UR ELPH-MCNC: 3.4 GM/DL
GLUCOSE SERPL-MCNC: 117 MG/DL (ref 65–99)
HCT VFR BLD AUTO: 44 % (ref 34–46.6)
HGB BLD-MCNC: 14.7 G/DL (ref 12–15.9)
HOLD SPECIMEN: NORMAL
HOLD SPECIMEN: NORMAL
IMM GRANULOCYTES # BLD AUTO: 0.06 10*3/MM3 (ref 0–0.05)
IMM GRANULOCYTES NFR BLD AUTO: 0.4 % (ref 0–0.5)
LIPASE SERPL-CCNC: 422 U/L (ref 13–60)
LYMPHOCYTES # BLD AUTO: 1.51 10*3/MM3 (ref 0.7–3.1)
LYMPHOCYTES NFR BLD AUTO: 10.5 % (ref 19.6–45.3)
MCH RBC QN AUTO: 32.2 PG (ref 26.6–33)
MCHC RBC AUTO-ENTMCNC: 33.4 G/DL (ref 31.5–35.7)
MCV RBC AUTO: 96.5 FL (ref 79–97)
MONOCYTES # BLD AUTO: 1.12 10*3/MM3 (ref 0.1–0.9)
MONOCYTES NFR BLD AUTO: 7.8 % (ref 5–12)
NEUTROPHILS NFR BLD AUTO: 11.6 10*3/MM3 (ref 1.7–7)
NEUTROPHILS NFR BLD AUTO: 80.3 % (ref 42.7–76)
NRBC BLD AUTO-RTO: 0 /100 WBC (ref 0–0.2)
PLATELET # BLD AUTO: 263 10*3/MM3 (ref 140–450)
PMV BLD AUTO: 9.4 FL (ref 6–12)
POTASSIUM SERPL-SCNC: 3.9 MMOL/L (ref 3.5–5.2)
PROT SERPL-MCNC: 7.1 G/DL (ref 6–8.5)
RBC # BLD AUTO: 4.56 10*6/MM3 (ref 3.77–5.28)
SODIUM SERPL-SCNC: 135 MMOL/L (ref 136–145)
WBC # BLD AUTO: 14.44 10*3/MM3 (ref 3.4–10.8)
WHOLE BLOOD HOLD SPECIMEN: NORMAL
WHOLE BLOOD HOLD SPECIMEN: NORMAL

## 2021-01-11 PROCEDURE — 80053 COMPREHEN METABOLIC PANEL: CPT

## 2021-01-11 PROCEDURE — 85025 COMPLETE CBC W/AUTO DIFF WBC: CPT

## 2021-01-11 PROCEDURE — 83690 ASSAY OF LIPASE: CPT

## 2021-01-11 PROCEDURE — 99211 OFF/OP EST MAY X REQ PHY/QHP: CPT

## 2021-01-11 RX ORDER — SODIUM CHLORIDE 9 MG/ML
10 INJECTION INTRAVENOUS AS NEEDED
Status: DISCONTINUED | OUTPATIENT
Start: 2021-01-11 | End: 2021-01-11 | Stop reason: HOSPADM

## 2021-01-13 ENCOUNTER — APPOINTMENT (OUTPATIENT)
Dept: CT IMAGING | Facility: HOSPITAL | Age: 66
End: 2021-01-13

## 2021-01-13 ENCOUNTER — HOSPITAL ENCOUNTER (OUTPATIENT)
Facility: HOSPITAL | Age: 66
Setting detail: OBSERVATION
Discharge: HOME | End: 2021-01-14
Attending: EMERGENCY MEDICINE | Admitting: INTERNAL MEDICINE

## 2021-01-13 DIAGNOSIS — R10.13 EPIGASTRIC PAIN: ICD-10-CM

## 2021-01-13 DIAGNOSIS — K85.90 ACUTE PANCREATITIS WITHOUT INFECTION OR NECROSIS, UNSPECIFIED PANCREATITIS TYPE: Primary | ICD-10-CM

## 2021-01-13 DIAGNOSIS — D72.829 LEUKOCYTOSIS, UNSPECIFIED TYPE: ICD-10-CM

## 2021-01-13 LAB
ALBUMIN SERPL-MCNC: 3.9 G/DL (ref 3.5–5.2)
ALBUMIN/GLOB SERPL: 1 G/DL
ALP SERPL-CCNC: 88 U/L (ref 39–117)
ALT SERPL W P-5'-P-CCNC: 41 U/L (ref 1–33)
ANION GAP SERPL CALCULATED.3IONS-SCNC: 14 MMOL/L (ref 5–15)
AST SERPL-CCNC: 31 U/L (ref 1–32)
BACTERIA UR QL AUTO: NORMAL /HPF
BASOPHILS # BLD AUTO: 0.03 10*3/MM3 (ref 0–0.2)
BASOPHILS NFR BLD AUTO: 0.2 % (ref 0–1.5)
BILIRUB SERPL-MCNC: 0.5 MG/DL (ref 0–1.2)
BILIRUB UR QL STRIP: NEGATIVE
BUN SERPL-MCNC: 13 MG/DL (ref 8–23)
BUN/CREAT SERPL: 20 (ref 7–25)
CALCIUM SPEC-SCNC: 10.2 MG/DL (ref 8.6–10.5)
CHLORIDE SERPL-SCNC: 99 MMOL/L (ref 98–107)
CHOLEST SERPL-MCNC: 128 MG/DL (ref 0–200)
CLARITY UR: CLEAR
CO2 SERPL-SCNC: 23 MMOL/L (ref 22–29)
COLOR UR: YELLOW
CREAT SERPL-MCNC: 0.65 MG/DL (ref 0.57–1)
D-LACTATE SERPL-SCNC: 1.1 MMOL/L (ref 0.5–2)
DEPRECATED RDW RBC AUTO: 41.4 FL (ref 37–54)
EOSINOPHIL # BLD AUTO: 0.09 10*3/MM3 (ref 0–0.4)
EOSINOPHIL NFR BLD AUTO: 0.6 % (ref 0.3–6.2)
ERYTHROCYTE [DISTWIDTH] IN BLOOD BY AUTOMATED COUNT: 12.1 % (ref 12.3–15.4)
GFR SERPL CREATININE-BSD FRML MDRD: 91 ML/MIN/1.73
GLOBULIN UR ELPH-MCNC: 4.1 GM/DL
GLUCOSE SERPL-MCNC: 119 MG/DL (ref 65–99)
GLUCOSE UR STRIP-MCNC: NEGATIVE MG/DL
HCT VFR BLD AUTO: 45.6 % (ref 34–46.6)
HDLC SERPL-MCNC: 65 MG/DL (ref 40–60)
HGB BLD-MCNC: 15.5 G/DL (ref 12–15.9)
HGB UR QL STRIP.AUTO: NEGATIVE
HOLD SPECIMEN: NORMAL
HOLD SPECIMEN: NORMAL
HYALINE CASTS UR QL AUTO: NORMAL /LPF
IMM GRANULOCYTES # BLD AUTO: 0.08 10*3/MM3 (ref 0–0.05)
IMM GRANULOCYTES NFR BLD AUTO: 0.5 % (ref 0–0.5)
KETONES UR QL STRIP: ABNORMAL
LDLC SERPL CALC-MCNC: 49 MG/DL (ref 0–100)
LDLC/HDLC SERPL: 0.77 {RATIO}
LEUKOCYTE ESTERASE UR QL STRIP.AUTO: ABNORMAL
LIPASE SERPL-CCNC: 101 U/L (ref 13–60)
LYMPHOCYTES # BLD AUTO: 1.98 10*3/MM3 (ref 0.7–3.1)
LYMPHOCYTES NFR BLD AUTO: 12.9 % (ref 19.6–45.3)
MCH RBC QN AUTO: 31.9 PG (ref 26.6–33)
MCHC RBC AUTO-ENTMCNC: 34 G/DL (ref 31.5–35.7)
MCV RBC AUTO: 93.8 FL (ref 79–97)
MONOCYTES # BLD AUTO: 1.34 10*3/MM3 (ref 0.1–0.9)
MONOCYTES NFR BLD AUTO: 8.7 % (ref 5–12)
NEUTROPHILS NFR BLD AUTO: 11.86 10*3/MM3 (ref 1.7–7)
NEUTROPHILS NFR BLD AUTO: 77.1 % (ref 42.7–76)
NITRITE UR QL STRIP: NEGATIVE
NRBC BLD AUTO-RTO: 0 /100 WBC (ref 0–0.2)
PH UR STRIP.AUTO: 7 [PH] (ref 5–8)
PLATELET # BLD AUTO: 328 10*3/MM3 (ref 140–450)
PMV BLD AUTO: 9.3 FL (ref 6–12)
POTASSIUM SERPL-SCNC: 4 MMOL/L (ref 3.5–5.2)
PROT SERPL-MCNC: 8 G/DL (ref 6–8.5)
PROT UR QL STRIP: NEGATIVE
RBC # BLD AUTO: 4.86 10*6/MM3 (ref 3.77–5.28)
RBC # UR: NORMAL /HPF
REF LAB TEST METHOD: NORMAL
SODIUM SERPL-SCNC: 136 MMOL/L (ref 136–145)
SP GR UR STRIP: <=1.005 (ref 1–1.03)
SQUAMOUS #/AREA URNS HPF: NORMAL /HPF
TRIGL SERPL-MCNC: 66 MG/DL (ref 0–150)
UROBILINOGEN UR QL STRIP: ABNORMAL
VLDLC SERPL-MCNC: 14 MG/DL (ref 5–40)
WBC # BLD AUTO: 15.38 10*3/MM3 (ref 3.4–10.8)
WBC UR QL AUTO: NORMAL /HPF
WHOLE BLOOD HOLD SPECIMEN: NORMAL
WHOLE BLOOD HOLD SPECIMEN: NORMAL

## 2021-01-13 PROCEDURE — G0378 HOSPITAL OBSERVATION PER HR: HCPCS

## 2021-01-13 PROCEDURE — 83690 ASSAY OF LIPASE: CPT | Performed by: EMERGENCY MEDICINE

## 2021-01-13 PROCEDURE — 25010000002 HEPARIN (PORCINE) PER 1000 UNITS: Performed by: INTERNAL MEDICINE

## 2021-01-13 PROCEDURE — 96375 TX/PRO/DX INJ NEW DRUG ADDON: CPT

## 2021-01-13 PROCEDURE — 96361 HYDRATE IV INFUSION ADD-ON: CPT

## 2021-01-13 PROCEDURE — 99284 EMERGENCY DEPT VISIT MOD MDM: CPT

## 2021-01-13 PROCEDURE — 83605 ASSAY OF LACTIC ACID: CPT | Performed by: EMERGENCY MEDICINE

## 2021-01-13 PROCEDURE — 80053 COMPREHEN METABOLIC PANEL: CPT | Performed by: EMERGENCY MEDICINE

## 2021-01-13 PROCEDURE — 96374 THER/PROPH/DIAG INJ IV PUSH: CPT

## 2021-01-13 PROCEDURE — 25010000002 HYDROMORPHONE PER 4 MG: Performed by: EMERGENCY MEDICINE

## 2021-01-13 PROCEDURE — 80061 LIPID PANEL: CPT | Performed by: INTERNAL MEDICINE

## 2021-01-13 PROCEDURE — 25010000002 ONDANSETRON PER 1 MG: Performed by: EMERGENCY MEDICINE

## 2021-01-13 PROCEDURE — 99220 PR INITIAL OBSERVATION CARE/DAY 70 MINUTES: CPT | Performed by: INTERNAL MEDICINE

## 2021-01-13 PROCEDURE — 85025 COMPLETE CBC W/AUTO DIFF WBC: CPT | Performed by: EMERGENCY MEDICINE

## 2021-01-13 PROCEDURE — 96372 THER/PROPH/DIAG INJ SC/IM: CPT

## 2021-01-13 PROCEDURE — 81001 URINALYSIS AUTO W/SCOPE: CPT | Performed by: EMERGENCY MEDICINE

## 2021-01-13 PROCEDURE — 99285 EMERGENCY DEPT VISIT HI MDM: CPT

## 2021-01-13 PROCEDURE — 74176 CT ABD & PELVIS W/O CONTRAST: CPT

## 2021-01-13 RX ORDER — NALOXONE HCL 0.4 MG/ML
0.4 VIAL (ML) INJECTION
Status: DISCONTINUED | OUTPATIENT
Start: 2021-01-13 | End: 2021-01-14 | Stop reason: HOSPADM

## 2021-01-13 RX ORDER — MORPHINE SULFATE 2 MG/ML
1 INJECTION, SOLUTION INTRAMUSCULAR; INTRAVENOUS EVERY 4 HOURS PRN
Status: DISCONTINUED | OUTPATIENT
Start: 2021-01-13 | End: 2021-01-14 | Stop reason: HOSPADM

## 2021-01-13 RX ORDER — SODIUM CHLORIDE 9 MG/ML
80 INJECTION, SOLUTION INTRAVENOUS CONTINUOUS
Status: DISCONTINUED | OUTPATIENT
Start: 2021-01-13 | End: 2021-01-14

## 2021-01-13 RX ORDER — CYCLOBENZAPRINE HCL 10 MG
10 TABLET ORAL NIGHTLY
Status: DISCONTINUED | OUTPATIENT
Start: 2021-01-13 | End: 2021-01-14 | Stop reason: HOSPADM

## 2021-01-13 RX ORDER — HYDROMORPHONE HYDROCHLORIDE 1 MG/ML
0.5 INJECTION, SOLUTION INTRAMUSCULAR; INTRAVENOUS; SUBCUTANEOUS ONCE
Status: COMPLETED | OUTPATIENT
Start: 2021-01-13 | End: 2021-01-13

## 2021-01-13 RX ORDER — SODIUM CHLORIDE 0.9 % (FLUSH) 0.9 %
10 SYRINGE (ML) INJECTION EVERY 12 HOURS SCHEDULED
Status: DISCONTINUED | OUTPATIENT
Start: 2021-01-13 | End: 2021-01-14 | Stop reason: HOSPADM

## 2021-01-13 RX ORDER — LOSARTAN POTASSIUM 25 MG/1
25 TABLET ORAL
Status: DISCONTINUED | OUTPATIENT
Start: 2021-01-13 | End: 2021-01-14

## 2021-01-13 RX ORDER — SODIUM CHLORIDE 9 MG/ML
10 INJECTION INTRAVENOUS AS NEEDED
Status: DISCONTINUED | OUTPATIENT
Start: 2021-01-13 | End: 2021-01-14 | Stop reason: HOSPADM

## 2021-01-13 RX ORDER — ONDANSETRON 4 MG/1
4 TABLET, FILM COATED ORAL EVERY 6 HOURS PRN
Status: DISCONTINUED | OUTPATIENT
Start: 2021-01-13 | End: 2021-01-14 | Stop reason: HOSPADM

## 2021-01-13 RX ORDER — ONDANSETRON 2 MG/ML
4 INJECTION INTRAMUSCULAR; INTRAVENOUS EVERY 6 HOURS PRN
Status: DISCONTINUED | OUTPATIENT
Start: 2021-01-13 | End: 2021-01-14 | Stop reason: HOSPADM

## 2021-01-13 RX ORDER — CALCIUM CARBONATE 750 MG/1
750 TABLET, CHEWABLE ORAL 2 TIMES DAILY PRN
Status: DISCONTINUED | OUTPATIENT
Start: 2021-01-13 | End: 2021-01-14 | Stop reason: HOSPADM

## 2021-01-13 RX ORDER — SODIUM CHLORIDE 0.9 % (FLUSH) 0.9 %
10 SYRINGE (ML) INJECTION AS NEEDED
Status: DISCONTINUED | OUTPATIENT
Start: 2021-01-13 | End: 2021-01-14 | Stop reason: HOSPADM

## 2021-01-13 RX ORDER — ACETAMINOPHEN 325 MG/1
650 TABLET ORAL EVERY 4 HOURS PRN
Status: DISCONTINUED | OUTPATIENT
Start: 2021-01-13 | End: 2021-01-14 | Stop reason: HOSPADM

## 2021-01-13 RX ORDER — ROSUVASTATIN CALCIUM 10 MG/1
10 TABLET, COATED ORAL NIGHTLY
Status: DISCONTINUED | OUTPATIENT
Start: 2021-01-13 | End: 2021-01-14 | Stop reason: HOSPADM

## 2021-01-13 RX ORDER — ACETAMINOPHEN 160 MG/5ML
650 SOLUTION ORAL EVERY 4 HOURS PRN
Status: DISCONTINUED | OUTPATIENT
Start: 2021-01-13 | End: 2021-01-14 | Stop reason: HOSPADM

## 2021-01-13 RX ORDER — HEPARIN SODIUM 5000 [USP'U]/ML
5000 INJECTION, SOLUTION INTRAVENOUS; SUBCUTANEOUS EVERY 8 HOURS SCHEDULED
Status: DISCONTINUED | OUTPATIENT
Start: 2021-01-13 | End: 2021-01-14 | Stop reason: HOSPADM

## 2021-01-13 RX ORDER — ROSUVASTATIN CALCIUM 10 MG/1
10 TABLET, COATED ORAL DAILY
Status: DISCONTINUED | OUTPATIENT
Start: 2021-01-14 | End: 2021-01-13

## 2021-01-13 RX ORDER — ACETAMINOPHEN 650 MG/1
650 SUPPOSITORY RECTAL EVERY 4 HOURS PRN
Status: DISCONTINUED | OUTPATIENT
Start: 2021-01-13 | End: 2021-01-14 | Stop reason: HOSPADM

## 2021-01-13 RX ORDER — HYDROCODONE BITARTRATE AND ACETAMINOPHEN 5; 325 MG/1; MG/1
1 TABLET ORAL EVERY 4 HOURS PRN
Status: DISCONTINUED | OUTPATIENT
Start: 2021-01-13 | End: 2021-01-14 | Stop reason: HOSPADM

## 2021-01-13 RX ORDER — ONDANSETRON 2 MG/ML
4 INJECTION INTRAMUSCULAR; INTRAVENOUS ONCE
Status: COMPLETED | OUTPATIENT
Start: 2021-01-13 | End: 2021-01-13

## 2021-01-13 RX ADMIN — LOSARTAN POTASSIUM 25 MG: 25 TABLET, FILM COATED ORAL at 18:22

## 2021-01-13 RX ADMIN — ROSUVASTATIN CALCIUM 10 MG: 10 TABLET, COATED ORAL at 22:53

## 2021-01-13 RX ADMIN — HYDROMORPHONE HYDROCHLORIDE 0.5 MG: 1 INJECTION, SOLUTION INTRAMUSCULAR; INTRAVENOUS; SUBCUTANEOUS at 08:11

## 2021-01-13 RX ADMIN — HEPARIN SODIUM 5000 UNITS: 5000 INJECTION INTRAVENOUS; SUBCUTANEOUS at 21:07

## 2021-01-13 RX ADMIN — SODIUM CHLORIDE 80 ML/HR: 9 INJECTION, SOLUTION INTRAVENOUS at 13:28

## 2021-01-13 RX ADMIN — SODIUM CHLORIDE, PRESERVATIVE FREE 10 ML: 5 INJECTION INTRAVENOUS at 21:07

## 2021-01-13 RX ADMIN — HEPARIN SODIUM 5000 UNITS: 5000 INJECTION INTRAVENOUS; SUBCUTANEOUS at 13:37

## 2021-01-13 RX ADMIN — HYDROCODONE BITARTRATE AND ACETAMINOPHEN 1 TABLET: 5; 325 TABLET ORAL at 13:37

## 2021-01-13 RX ADMIN — HYDROCODONE BITARTRATE AND ACETAMINOPHEN 1 TABLET: 5; 325 TABLET ORAL at 21:07

## 2021-01-13 RX ADMIN — SODIUM CHLORIDE, PRESERVATIVE FREE 10 ML: 5 INJECTION INTRAVENOUS at 13:38

## 2021-01-13 RX ADMIN — CYCLOBENZAPRINE HYDROCHLORIDE 10 MG: 10 TABLET, FILM COATED ORAL at 22:53

## 2021-01-13 RX ADMIN — ONDANSETRON 4 MG: 2 INJECTION INTRAMUSCULAR; INTRAVENOUS at 06:51

## 2021-01-13 RX ADMIN — SODIUM CHLORIDE 1000 ML: 9 INJECTION, SOLUTION INTRAVENOUS at 06:50

## 2021-01-13 NOTE — ED PROVIDER NOTES
Subjective   This patient is a very nice 65-year-old female who comes in today for left lower quadrant abdominal pain over the last couple of weeks.  She tells me that over the last 1 year, she has had relatively consistent episodes of constipation.  She denies any vomiting.  Reports mild nausea.  Denies any hemoptysis or hematemesis.  Denies any blood in her stool.  She tells me her last colonoscopy was 4 years ago.  She tells me she actually scheduled an appointment with her primary care physician Dr. Rosario today at approximately 8 AM but came in here to see if she could get some help ahead of time.  She denies any syncope or presyncope.  Her last visit here was on the 11th, when she left that being seen.  Prior to that she was here for an episode of dizziness.  She had a relatively extensive work-up at that time.  Please refer to past medical documentation which I have reviewed in detail for specifics.  Patient is pleasant, articulate, and in no acute distress here.  She is holding her left abdomen.  She tells me her  has a history of diverticulitis and indicated to her, the patient, that her presentation seems somewhat similar.  She denies any history of Crohn's disease or ulcerative colitis.  Denies any headache or vision changes.  Denies any Covid exposure or Covid diagnosis.  She reports a history of hypercholesterolemia and hypertension as well as a history of rheumatoid arthritis otherwise she has been relatively healthy with the exception of these bowel issues.  In summary, we have a 65-year-old female with ongoing abdominal complaints over the last 1 year, worse over the last 2 weeks who tells me she has not had a good bowel movement in the last 2 weeks.    Past medical history  Rheumatoid arthritis, hypertension, hypercholesterolemia.  Negative for CAD or stroke    Family history  Negative for bowel cancer, ulcerative colitis, Crohn's disease          Review of Systems   Constitutional: Negative.   Negative for chills, fatigue, fever and unexpected weight change.   HENT: Negative for dental problem, ear pain, hearing loss and sinus pressure.    Eyes: Negative for pain and visual disturbance.   Respiratory: Negative for chest tightness and shortness of breath.    Cardiovascular: Negative for chest pain, palpitations and leg swelling.   Gastrointestinal: Positive for abdominal distention, abdominal pain and constipation. Negative for blood in stool, diarrhea, nausea and vomiting.   Genitourinary: Negative for difficulty urinating, dysuria, frequency, hematuria and urgency.   Musculoskeletal: Negative for myalgias, neck pain and neck stiffness.   Neurological: Negative for seizures, syncope, speech difficulty, light-headedness and headaches.   Psychiatric/Behavioral: Negative for confusion.   All other systems reviewed and are negative.      Past Medical History:   Diagnosis Date   • Arthritis    • Bell's palsy    • Hyperlipidemia    • Hypertension    • Neuropathy    • Rheumatoid arthritis (CMS/HCC)        Allergies   Allergen Reactions   • Quinolones    • Sulfa Antibiotics Rash       Past Surgical History:   Procedure Laterality Date   • BACK SURGERY     • CHOLECYSTECTOMY     • FOOT SURGERY     • HYSTERECTOMY     • KNEE SURGERY     • TOTAL HIP ARTHROPLASTY         Family History   Problem Relation Age of Onset   • Lung cancer Father    • Cancer Maternal Grandmother    • Stroke Maternal Grandfather    • Kidney disease Mother    • Lung disease Mother    • Arrhythmia Mother    • No Known Problems Sister    • Rheum arthritis Paternal Grandmother    • No Known Problems Sister    • No Known Problems Half-Sister    • No Known Problems Daughter    • No Known Problems Son        Social History     Socioeconomic History   • Marital status:      Spouse name: Not on file   • Number of children: Not on file   • Years of education: Not on file   • Highest education level: Not on file   Tobacco Use   • Smoking status:  Never Smoker   • Smokeless tobacco: Never Used   Substance and Sexual Activity   • Alcohol use: No   • Drug use: No   • Sexual activity: Defer   Social History Narrative    Caffeine: 1 tj 8 monthly            Objective   Physical Exam  Vitals signs and nursing note reviewed.   Constitutional:       General: She is not in acute distress.     Appearance: She is well-developed. She is not toxic-appearing.   HENT:      Head: Normocephalic and atraumatic.      Jaw: No trismus.      Right Ear: Tympanic membrane, ear canal and external ear normal.      Left Ear: Tympanic membrane, ear canal and external ear normal.      Nose: Nose normal.      Mouth/Throat:      Mouth: Mucous membranes are not dry. No oral lesions.      Dentition: No dental abscesses.      Pharynx: No posterior oropharyngeal erythema or uvula swelling.      Tonsils: No tonsillar exudate or tonsillar abscesses.   Eyes:      Conjunctiva/sclera:      Right eye: Right conjunctiva is not injected.      Left eye: Left conjunctiva is not injected.      Pupils: Pupils are equal, round, and reactive to light.   Neck:      Musculoskeletal: Normal range of motion and neck supple. Normal range of motion. No neck rigidity.      Vascular: No JVD.      Trachea: No tracheal tenderness.   Cardiovascular:      Rate and Rhythm: Normal rate and regular rhythm.      Heart sounds: Normal heart sounds. No friction rub. No gallop.    Pulmonary:      Effort: Pulmonary effort is normal.      Breath sounds: Normal breath sounds. No wheezing or rales.   Chest:      Chest wall: No tenderness.   Abdominal:      General: Bowel sounds are normal. There is no distension.      Palpations: Abdomen is soft. Abdomen is not rigid. There is no mass or pulsatile mass.      Tenderness: There is abdominal tenderness. There is no guarding or rebound. Negative signs include McBurney's sign.      Comments: No signs of acute abdomen.  No pain at McBurney's point.  No pulsatile abdominal mass.  Mild  tenderness to palpation left lower quadrant.   Musculoskeletal: Normal range of motion.         General: No tenderness or deformity.   Lymphadenopathy:      Cervical: No cervical adenopathy.   Skin:     General: Skin is warm and dry.      Findings: No erythema or rash.      Comments: No diaphoresis, lesions, nevi, petechia, purpura   Neurological:      Mental Status: She is alert and oriented to person, place, and time.      Cranial Nerves: No cranial nerve deficit.      Sensory: No sensory deficit.      Motor: No tremor or abnormal muscle tone.      Comments: 5/5 strength bilaterally with flexion and extension of fingers, wrist, elbows, knees and hips as well as plantar and dorsiflexion of the foot.   Psychiatric:         Attention and Perception: She is attentive.         Speech: Speech normal.         Behavior: Behavior normal.         Thought Content: Thought content normal.         Judgment: Judgment normal.         Procedures           ED Course  ED Course as of Jan 14 1542   Wed Jan 13, 2021   0643 I had a nice conversation with the patient.  We discussed the differential diagnosis and medical decision making in great detail.  The patient has a nonacute abdominal exam at this time.  No pain at McBurney's point.  No signs of a pulsatile abdominal mass.  I ordered a CT scan of the abdomen and pelvis.  We talked about diverticulitis as a potential etiology.  Talked about bowel ileus, constipation, perforation, and other potential etiologies.  Laboratory studies pending.  I am going to give the patient IV fluid rehydration and also order a soapsuds enema.  I do believe the patient will be okay for discharge home.  She actually has an appointment with her primary care physician today.  While I do not believe we will make the appointment time of approximately 8 AM, I am hopeful that she can follow-up with her PCP and GI physician this week and the patient is agreeable.  All questions have been answered at the  bedside.  Patient is agreeable to the plan.  Final impression and plan following completion of work-up.    [ANTOINETTE]   0748 I have reexamined the patient twice for total of 3 evaluations.  CT of abdomen and pelvis, and labs with the exception of urinalysis have been resulted.  I discussed them in detail with the patient.  CBC shows a white count elevated at 15,000.  Platelet count, hemoglobin and hematocrit okay.  Lipase elevated at 101.  CMP essentially unremarkable with exception of ALT of 41 and glucose of 119.  Lactic acid 1.1.  Urinalysis pending.  CT scan of the abdomen and pelvis shows signs of pancreatic head inflammatory changes concerning for pancreatitis.  On last reexamination, a careful physical exam of this region took place and the patient was exquisitely tender in the midepigastrium.  I talked the patient about the IV fluid rehydration and pain control ordered and required here.  I have consulted the hospitalist for admission.  Patient will benefit from observation to ensure this does not progress, will require IV fluid rehydration, will likely remain in n.p.o. status and will have access to pain medication as needed.  Impression will include acute pancreatitis, leukocytosis, abdominal pain.  Patient has been very appreciative for care and is without question or complaint will be admitted accordingly.    [ANTOINETTE]   0800 I discussed the case with Dr. Charmaine Mohamud at approximately 8 AM Eastern time.  She will admit the patient to the CDU for observation.  Patient has been made aware of the plan and is without question or complaint will be admitted accordingly.    [ANTOINETTE]      ED Course User Index  [ANTOINETTE] Meng Faulkner MD      Recent Results (from the past 24 hour(s))   COVID-19 and FLU A/B PCR - Swab, Nasopharynx    Collection Time: 01/14/21  3:21 AM    Specimen: Nasopharynx; Swab   Result Value Ref Range    COVID19 Not Detected Not Detected - Ref. Range    Influenza A PCR Not Detected Not Detected    Influenza B  PCR Not Detected Not Detected   Basic Metabolic Panel    Collection Time: 01/14/21  3:28 AM    Specimen: Blood   Result Value Ref Range    Glucose 82 65 - 99 mg/dL    BUN 10 8 - 23 mg/dL    Creatinine 0.63 0.57 - 1.00 mg/dL    Sodium 137 136 - 145 mmol/L    Potassium 4.4 3.5 - 5.2 mmol/L    Chloride 106 98 - 107 mmol/L    CO2 24.0 22.0 - 29.0 mmol/L    Calcium 7.9 (L) 8.6 - 10.5 mg/dL    eGFR Non African Amer 95 >60 mL/min/1.73    BUN/Creatinine Ratio 15.9 7.0 - 25.0    Anion Gap 7.0 5.0 - 15.0 mmol/L   Amylase    Collection Time: 01/14/21  3:28 AM    Specimen: Blood   Result Value Ref Range    Amylase 38 28 - 100 U/L   Lipase    Collection Time: 01/14/21  3:28 AM    Specimen: Blood   Result Value Ref Range    Lipase 51 13 - 60 U/L   Magnesium    Collection Time: 01/14/21  3:28 AM    Specimen: Blood   Result Value Ref Range    Magnesium 2.2 1.6 - 2.4 mg/dL   Phosphorus    Collection Time: 01/14/21  3:28 AM    Specimen: Blood   Result Value Ref Range    Phosphorus 3.1 2.5 - 4.5 mg/dL   Manual Differential    Collection Time: 01/14/21  3:28 AM    Specimen: Blood   Result Value Ref Range    Neutrophil % 64.0 42.7 - 76.0 %    Lymphocyte % 23.0 19.6 - 45.3 %    Monocyte % 6.0 5.0 - 12.0 %    Eosinophil % 3.0 0.3 - 6.2 %    Basophil % 1.0 0.0 - 1.5 %    Atypical Lymphocyte % 3.0 0.0 - 5.0 %    Neutrophils Absolute 4.92 1.70 - 7.00 10*3/mm3    Lymphocytes Absolute 1.77 0.70 - 3.10 10*3/mm3    Monocytes Absolute 0.46 0.10 - 0.90 10*3/mm3    Eosinophils Absolute 0.23 0.00 - 0.40 10*3/mm3    Basophils Absolute 0.08 0.00 - 0.20 10*3/mm3    RBC Morphology Normal Normal    WBC Morphology Normal Normal    Platelet Morphology Normal Normal   CBC Auto Differential    Collection Time: 01/14/21  3:28 AM    Specimen: Blood   Result Value Ref Range    WBC 7.69 3.40 - 10.80 10*3/mm3    RBC 4.29 3.77 - 5.28 10*6/mm3    Hemoglobin 13.2 12.0 - 15.9 g/dL    Hematocrit 41.2 34.0 - 46.6 %    MCV 96.0 79.0 - 97.0 fL    MCH 30.8 26.6 - 33.0 pg     MCHC 32.0 31.5 - 35.7 g/dL    RDW 12.4 12.3 - 15.4 %    RDW-SD 43.4 37.0 - 54.0 fl    MPV 9.2 6.0 - 12.0 fL    Platelets 282 140 - 450 10*3/mm3     Note: In addition to lab results from this visit, the labs listed above may include labs taken at another facility or during a different encounter within the last 24 hours. Please correlate lab times with ED admission and discharge times for further clarification of the services performed during this visit.    CT Abdomen Pelvis Without Contrast   Final Result   1.  CT findings suggesting mild acute pancreatitis involving the pancreatic head. Clinical and laboratory correlation recommended.   2.  Cholecystectomy. No bile duct or pancreatic duct dilatation.   3.  Small to moderate-sized hiatal hernia.   4.  Hysterectomy.      Signer Name: Kunal Peterson MD    Signed: 1/13/2021 7:11 AM    Workstation Name: [id number]     [facility]        Vitals:    01/13/21 2306 01/14/21 0323 01/14/21 0654 01/14/21 0914   BP: 131/74 140/77 117/68    BP Location: Right arm Right arm Left arm    Patient Position: Lying Lying Lying    Pulse: 89 85 86 95   Resp: 20 20 18    Temp: 97.7 °F (36.5 °C) 97.5 °F (36.4 °C) 98.9 °F (37.2 °C)    TempSrc: Oral Oral Oral    SpO2: 96% 96% 96%    Weight:       Height:         Medications   sodium chloride 0.9 % bolus 1,000 mL (0 mL Intravenous Stopped 1/13/21 2006)   ondansetron (ZOFRAN) injection 4 mg (4 mg Intravenous Given 1/13/21 0651)   HYDROmorphone (DILAUDID) injection 0.5 mg (0.5 mg Intravenous Given 1/13/21 0811)     ECG/EMG Results (last 24 hours)     ** No results found for the last 24 hours. **        No orders to display                                            MDM    Final diagnoses:   Acute pancreatitis without infection or necrosis, unspecified pancreatitis type   Epigastric pain   Leukocytosis, unspecified type            Meng Faulkner MD  01/14/21 5945

## 2021-01-13 NOTE — H&P
[facility] Medicine Services  HISTORY AND PHYSICAL    Patient Name: Alaina Kennedy  : 1955  MRN: [id number]  Primary Care Physician: Ilda Suarez MD  Date of admission: 2021      Subjective   Subjective     Chief Complaint:  Follow-up pain    HPI:  Alaina Kennedy is a 65 y.o. female with past medical history significant for hypertension, hyperlipidemia, rheumatoid arthritis and she is on disease modifying medications including Remicade and methotrexate.  Patient was in her usual state of health until this  when she started having abdominal pain.  Pain is mainly left upper quadrant and radiates to the back.  It comes and goes and intensity varies but sometimes it is very intense about 8 out of 10.  She has lost her appetite but she says when she eats a little bit of food she does not notice any change in the character or intensity of the pain.  Denies any fever or chills.  No nausea or vomiting or diarrhea.  No cough or coryza or any upper respiratory.  Of note, patient does not drink alcohol.  She is s/p cholecystectomy.        COVID Details: [x] No Symptoms   [] Fever []  Cough [] Shortness of breath [] Change in taste or smell   [] Direct Exposure [] High risk facility    Date of Onset:     Date of first positive COVID test:     Review of Systems   No weight loss or weight gain no lumps or bumps no unusual headaches, no visual changes, no difficulty with balance, no chest pain or shortness of breath on exertion, no rashes or hives.      All other systems reviewed and are negative.     Personal History     Past Medical History:   Diagnosis Date   • Arthritis    • Bell's palsy    • Hyperlipidemia    • Hypertension    • Neuropathy    • Rheumatoid arthritis (CMS/HCC)        Past Surgical History:   Procedure Laterality Date   • BACK SURGERY     • CHOLECYSTECTOMY     • FOOT SURGERY     • HYSTERECTOMY     • KNEE SURGERY     • TOTAL HIP ARTHROPLASTY         Family History:  family history includes Arrhythmia in her mother; Cancer in her maternal grandmother; Kidney disease in her mother; Lung cancer in her father; Lung disease in her mother; No Known Problems in her daughter, half-sister, sister, sister, and son; Rheum arthritis in her paternal grandmother; Stroke in her maternal grandfather. Otherwise pertinent FHx was reviewed and unremarkable.     Social History:  reports that she has never smoked. She has never used smokeless tobacco. She reports that she does not drink alcohol or use drugs.  Social History     Social History Narrative    Caffeine: 1 tj 8 monthly        Medications:  Available home medication information reviewed.  Medications Prior to Admission   Medication Sig Dispense Refill Last Dose   • cyclobenzaprine (FLEXERIL) 10 MG tablet Take 10 mg by mouth every night at bedtime.   Patient Taking Differently at Unknown time   • folic acid (FOLVITE) 1 MG tablet Take 5 mg by mouth Daily.   Past Week at Unknown time   • InFLIXimab (REMICADE IV) Infuse  into a venous catheter. Every 8 weeks, next dose due 1/15/2020   Patient Taking Differently at Unknown time   • losartan (COZAAR) 25 MG tablet Take 2 tablets by mouth Daily. (Patient taking differently: Take 50 mg by mouth 2 (Two) Times a Day.) 60 tablet 0 1/13/2021 at Unknown time   • methotrexate 2.5 MG tablet Take 20 mg by mouth 1 (One) Time Per Week. Taken on Sundays   Patient Taking Differently at Unknown time   • Multiple Vitamins-Calcium (ONE-A-DAY WOMENS FORMULA PO) One-A-Day Womens Formula 18 mg iron-400 mcg-500 mg Ca tablet   Past Week at Unknown time   • omeprazole (priLOSEC) 40 MG capsule Take 20 mg by mouth Daily.  5 1/13/2021 at Unknown time   • rosuvastatin (CRESTOR) 10 MG tablet Take 10 mg by mouth Daily.   1/12/2021 at Unknown time   • meclizine (ANTIVERT) 25 MG tablet Take 1 tablet by mouth 3 (Three) Times a Day As Needed for Dizziness. 20 tablet 0    • sulindac (CLINORIL) 200 MG tablet TAKE 1 TABLET BY  MOUTH EVERY DAY WITH FOOD AS NEEDED  1        Allergies   Allergen Reactions   • Quinolones    • Sulfa Antibiotics Rash       Objective   Objective     Vital Signs:   Temp:  [98 °F (36.7 °C)-98.2 °F (36.8 °C)] 98.2 °F (36.8 °C)  Heart Rate:  [] 83  Resp:  [18] 18  BP: (133-179)/() 133/67       Physical Exam   Constitutional: No acute distress, looks pretty comfortable resting in bed.  HENT: NCAT, mucous membranes moist  Respiratory: Clear to auscultation bilaterally, respiratory effort normal   Cardiovascular: RRR, no murmurs, rubs, or gallops  Gastrointestinal: Abdomen is obese.  Positive bowel sounds, soft, mild tenderness over left upper quadrant particular close to ribs.  Nondistended  Musculoskeletal: No bilateral ankle edema  Psychiatric: Appropriate affect, cooperative  Neurologic: Oriented x 3, strength symmetric in all extremities, Cranial Nerves grossly intact to confrontation, speech clear  Skin: No rashes    Result Review:  I have personally reviewed the results from the time of this admission to 01/13/21 4:47 PM EST and agree with these findings:  [x]  Laboratory  []  Microbiology  [x]  Radiology  []  EKG/Telemetry   []  Cardiology/Vascular   []  Pathology  []  Old records  []  Other:  Most notable findings include: Lipase is elevated, CT scan is consistent with acute pancreatitis.      LAB RESULTS:      Lab 01/13/21  0628 01/11/21  1532   WBC 15.38* 14.44*   HEMOGLOBIN 15.5 14.7   HEMATOCRIT 45.6 44.0   PLATELETS 328 263   NEUTROS ABS 11.86* 11.60*   IMMATURE GRANS (ABS) 0.08* 0.06*   LYMPHS ABS 1.98 1.51   MONOS ABS 1.34* 1.12*   EOS ABS 0.09 0.12   MCV 93.8 96.5   LACTATE 1.1  --          Lab 01/13/21  0628 01/11/21  1532   SODIUM 136 135*   POTASSIUM 4.0 3.9   CHLORIDE 99 103   CO2 23.0 20.0*   ANION GAP 14.0 12.0   BUN 13 11   CREATININE 0.65 0.64   GLUCOSE 119* 117*   CALCIUM 10.2 8.8         Lab 01/13/21  0628 01/11/21  1532   TOTAL PROTEIN 8.0 7.1   ALBUMIN 3.90 3.70   GLOBULIN 4.1  3.4   ALT (SGPT) 41* 25   AST (SGOT) 31 21   BILIRUBIN 0.5 0.4   ALK PHOS 88 66   LIPASE 101* 422*             Lab 01/13/21  0628   CHOLESTEROL 128   LDL CHOL 49   HDL CHOL 65*   TRIGLYCERIDES 66             Brief Urine Lab Results  (Last result in the past 365 days)      Color   Clarity   Blood   Leuk Est   Nitrite   Protein   CREAT   Urine HCG        01/13/21 1101 Yellow Clear Negative Trace Negative Negative             Microbiology Results (last 10 days)     ** No results found for the last 240 hours. **          Ct Abdomen Pelvis Without Contrast    Result Date: 1/13/2021  CT ABDOMEN AND PELVIS, NONCONTRAST, 1/13/2021 HISTORY: 65-year-old female in the ED complaining of 4 day history left lower quadrant abdomen pain with nausea. TECHNIQUE: CT imaging of the abdomen and pelvis ordered without oral or IV contrast. Radiation dose reduction techniques included automated exposure control. Radiation audit for CT and nuclear cardiology exams in the last 12 months: 0. ABDOMEN FINDINGS: The head of the pancreas is mildly enlarged and is surrounded by mild peripancreatic soft tissue edema. Body and tail of the pancreas appear normal. The appearance suggests mild acute pancreatitis, and clinical and laboratory correlation is recommended. The gallbladder is surgically absent, and there is no bile duct or pancreatic duct dilatation. Small to moderate-sized hiatal hernia without distal esophageal dilatation or gastric distention. Small bowel and colon are normal in caliber and appearance, as imaged. The appendix is not directly identified. Liver and spleen are normal in size and appearance. Both kidneys are negative with no nephrolithiasis or evidence of urinary obstruction. Normal caliber abdominal aorta. Normal adrenal glands. PELVIS FINDINGS: Hysterectomy. Empty urinary bladder and rectum are within normal limits. Pelvic metal artifact from right hip arthroplasty. No inguinal hernia. Lung base images show no active  disease.     Impression: 1.  CT findings suggesting mild acute pancreatitis involving the pancreatic head. Clinical and laboratory correlation recommended. 2.  Cholecystectomy. No bile duct or pancreatic duct dilatation. 3.  Small to moderate-sized hiatal hernia. 4.  Hysterectomy. Signer Name: Kunal Peterson MD  Signed: 1/13/2021 7:11 AM  Workstation Name: [email or web address]  [facility]      Results for orders placed during the hospital encounter of 09/13/17   Adult Transthoracic Echo Complete    Narrative · Left ventricular systolic function is normal. Estimated EF = 60%.  · Left ventricular diastolic dysfunction (grade I) consistent with   impaired relaxation.  · RVSP(TR) 10.6 mmHg          Assessment/Plan   Assessment & Plan     Active Hospital Problems    Diagnosis POA   • Acute pancreatitis without infection or necrosis [K85.90] Yes       Patient is a 65-year-old  female with past medical history significant for hypertension, hyperlipidemia, rheumatoid arthritis and patient is on Remicade and methotrexate.  Patient has had abdominal pain and lack of appetite since past Sunday.  No nausea or vomiting or diarrhea.  No fever or chills.  Work-up at the emergency room including CT scan and elevated lipase are compatible with pancreatitis.  Patient denies drinking alcohol.  Patient is a status post cholecystectomy.    PLAN:  -Admit to telemetry  -N.p.o. except sips and chips.  We will advance diet as fast as possible.  -IV fluids  -Check triglyceride  -Repeat labs in a.m.  -As mentioned, advance diet as she tolerates.    DVT prophylaxis: Heparin      CODE STATUS:  CPR,full    Code Status and Medical Interventions:   Ordered at: 01/13/21 1136     Code Status:    CPR     Medical Interventions (Level of Support Prior to Arrest):    Full       Admission Status: observation    Frank Gustafson MD  01/13/21

## 2021-01-13 NOTE — PLAN OF CARE
Problem: Adult Inpatient Plan of Care  Goal: Plan of Care Review  Outcome: Ongoing, Progressing  Goal: Patient-Specific Goal (Individualized)  Outcome: Ongoing, Progressing  Goal: Absence of Hospital-Acquired Illness or Injury  Outcome: Ongoing, Progressing  Intervention: Identify and Manage Fall Risk  Flowsheets  Taken 1/13/2021 1600  Safety Promotion/Fall Prevention:   activity supervised   assistive device/personal items within reach   clutter free environment maintained   fall prevention program maintained   lighting adjusted   nonskid shoes/slippers when out of bed   room organization consistent   safety round/check completed   toileting scheduled  Taken 1/13/2021 1400  Safety Promotion/Fall Prevention:   activity supervised   assistive device/personal items within reach   clutter free environment maintained   fall prevention program maintained   lighting adjusted   nonskid shoes/slippers when out of bed   room organization consistent   safety round/check completed   toileting scheduled  Taken 1/13/2021 1202  Safety Promotion/Fall Prevention:   clutter free environment maintained   fall prevention program maintained   lighting adjusted   nonskid shoes/slippers when out of bed   room organization consistent   safety round/check completed   toileting scheduled  Taken 1/13/2021 0940  Safety Promotion/Fall Prevention:   clutter free environment maintained   fall prevention program maintained   lighting adjusted   nonskid shoes/slippers when out of bed   room organization consistent   safety round/check completed   toileting scheduled  Intervention: Prevent Skin Injury  Flowsheets  Taken 1/13/2021 1600  Body Position: position changed independently  Taken 1/13/2021 1400  Body Position: position changed independently  Taken 1/13/2021 1202  Body Position: position changed independently  Taken 1/13/2021 0940  Body Position: position changed independently  Intervention: Prevent Infection  Flowsheets  Taken 1/13/2021  1600  Infection Prevention:   visitors restricted/screened   single patient room provided   rest/sleep promoted   personal protective equipment utilized   hand hygiene promoted   equipment surfaces disinfected  Taken 1/13/2021 1400  Infection Prevention:   visitors restricted/screened   single patient room provided   rest/sleep promoted   personal protective equipment utilized   hand hygiene promoted   equipment surfaces disinfected  Taken 1/13/2021 1202  Infection Prevention:   visitors restricted/screened   single patient room provided   rest/sleep promoted   personal protective equipment utilized   hand hygiene promoted   equipment surfaces disinfected  Taken 1/13/2021 0940  Infection Prevention:   visitors restricted/screened   single patient room provided   rest/sleep promoted   personal protective equipment utilized   hand hygiene promoted   equipment surfaces disinfected  Goal: Optimal Comfort and Wellbeing  Outcome: Ongoing, Progressing  Intervention: Provide Person-Centered Care  Flowsheets (Taken 1/13/2021 0940)  Trust Relationship/Rapport:   care explained   choices provided   emotional support provided   empathic listening provided   questions answered   questions encouraged   reassurance provided   thoughts/feelings acknowledged  Goal: Readiness for Transition of Care  Outcome: Ongoing, Progressing  Intervention: Mutually Develop Transition Plan  Flowsheets (Taken 1/13/2021 0941)  Equipment Currently Used at Home: none  Transportation Anticipated:   car, drives self   family or friend will provide  Patient/Family Anticipated Services at Transition: none  Patient/Family Anticipates Transition to: home with family     Problem: Fluid Imbalance (Pancreatitis)  Goal: Fluid Balance  Outcome: Ongoing, Progressing     Problem: Infection (Pancreatitis)  Goal: Infection Symptom Resolution  Outcome: Ongoing, Progressing     Problem: Nutrition Impaired (Pancreatitis)  Goal: Optimal Nutrition Intake  Outcome:  Ongoing, Progressing     Problem: Pain (Pancreatitis)  Goal: Acceptable Pain Control  Outcome: Ongoing, Progressing     Problem: Respiratory Compromise (Pancreatitis)  Goal: Effective Oxygenation and Ventilation  Outcome: Ongoing, Progressing  Intervention: Promote Airway Secretion Clearance  Flowsheets  Taken 1/13/2021 1600  Activity Management:   activity adjusted per tolerance   up ad james  Taken 1/13/2021 1400  Activity Management: activity adjusted per tolerance  Taken 1/13/2021 1202  Activity Management: up ad james  Taken 1/13/2021 0940  Activity Management: up ad james  Cough And Deep Breathing: done independently per patient  Intervention: Optimize Oxygenation and Ventilation  Flowsheets  Taken 1/13/2021 1600  Activity Management:   activity adjusted per tolerance   up ad james  Taken 1/13/2021 1400  Activity Management: activity adjusted per tolerance  Taken 1/13/2021 1202  Activity Management: up ad james  Taken 1/13/2021 0940  Activity Management: up ad james  Cough And Deep Breathing: done independently per patient   Goal Outcome Evaluation:   Patient transfer from ER.  Pain in abd area, tolerated with pain medication.  Patient states she wants to sleep as she hasn't gotten much in the past few days due to pain.  NSR on tele, on RA.  Fluids started.  Will continue to monitor.

## 2021-01-14 VITALS
HEIGHT: 64 IN | RESPIRATION RATE: 18 BRPM | DIASTOLIC BLOOD PRESSURE: 68 MMHG | OXYGEN SATURATION: 96 % | HEART RATE: 95 BPM | TEMPERATURE: 98.9 F | SYSTOLIC BLOOD PRESSURE: 117 MMHG | WEIGHT: 151 LBS | BODY MASS INDEX: 25.78 KG/M2

## 2021-01-14 LAB
AMYLASE SERPL-CCNC: 38 U/L (ref 28–100)
ANION GAP SERPL CALCULATED.3IONS-SCNC: 7 MMOL/L (ref 5–15)
BASOPHILS # BLD MANUAL: 0.08 10*3/MM3 (ref 0–0.2)
BASOPHILS NFR BLD AUTO: 1 % (ref 0–1.5)
BUN SERPL-MCNC: 10 MG/DL (ref 8–23)
BUN/CREAT SERPL: 15.9 (ref 7–25)
CALCIUM SPEC-SCNC: 7.9 MG/DL (ref 8.6–10.5)
CHLORIDE SERPL-SCNC: 106 MMOL/L (ref 98–107)
CO2 SERPL-SCNC: 24 MMOL/L (ref 22–29)
CREAT SERPL-MCNC: 0.63 MG/DL (ref 0.57–1)
DEPRECATED RDW RBC AUTO: 43.4 FL (ref 37–54)
EOSINOPHIL # BLD MANUAL: 0.23 10*3/MM3 (ref 0–0.4)
EOSINOPHIL NFR BLD MANUAL: 3 % (ref 0.3–6.2)
ERYTHROCYTE [DISTWIDTH] IN BLOOD BY AUTOMATED COUNT: 12.4 % (ref 12.3–15.4)
FLUAV RNA RESP QL NAA+PROBE: NOT DETECTED
FLUBV RNA RESP QL NAA+PROBE: NOT DETECTED
GFR SERPL CREATININE-BSD FRML MDRD: 95 ML/MIN/1.73
GLUCOSE SERPL-MCNC: 82 MG/DL (ref 65–99)
HCT VFR BLD AUTO: 41.2 % (ref 34–46.6)
HGB BLD-MCNC: 13.2 G/DL (ref 12–15.9)
LIPASE SERPL-CCNC: 51 U/L (ref 13–60)
LYMPHOCYTES # BLD MANUAL: 1.77 10*3/MM3 (ref 0.7–3.1)
LYMPHOCYTES NFR BLD MANUAL: 23 % (ref 19.6–45.3)
LYMPHOCYTES NFR BLD MANUAL: 6 % (ref 5–12)
MAGNESIUM SERPL-MCNC: 2.2 MG/DL (ref 1.6–2.4)
MCH RBC QN AUTO: 30.8 PG (ref 26.6–33)
MCHC RBC AUTO-ENTMCNC: 32 G/DL (ref 31.5–35.7)
MCV RBC AUTO: 96 FL (ref 79–97)
MONOCYTES # BLD AUTO: 0.46 10*3/MM3 (ref 0.1–0.9)
NEUTROPHILS # BLD AUTO: 4.92 10*3/MM3 (ref 1.7–7)
NEUTROPHILS NFR BLD MANUAL: 64 % (ref 42.7–76)
PHOSPHATE SERPL-MCNC: 3.1 MG/DL (ref 2.5–4.5)
PLAT MORPH BLD: NORMAL
PLATELET # BLD AUTO: 282 10*3/MM3 (ref 140–450)
PMV BLD AUTO: 9.2 FL (ref 6–12)
POTASSIUM SERPL-SCNC: 4.4 MMOL/L (ref 3.5–5.2)
RBC # BLD AUTO: 4.29 10*6/MM3 (ref 3.77–5.28)
RBC MORPH BLD: NORMAL
SARS-COV-2 RNA RESP QL NAA+PROBE: NOT DETECTED
SODIUM SERPL-SCNC: 137 MMOL/L (ref 136–145)
VARIANT LYMPHS NFR BLD MANUAL: 3 % (ref 0–5)
WBC # BLD AUTO: 7.69 10*3/MM3 (ref 3.4–10.8)
WBC MORPH BLD: NORMAL

## 2021-01-14 PROCEDURE — 82150 ASSAY OF AMYLASE: CPT | Performed by: INTERNAL MEDICINE

## 2021-01-14 PROCEDURE — 84100 ASSAY OF PHOSPHORUS: CPT | Performed by: INTERNAL MEDICINE

## 2021-01-14 PROCEDURE — 99217 PR OBSERVATION CARE DISCHARGE MANAGEMENT: CPT | Performed by: INTERNAL MEDICINE

## 2021-01-14 PROCEDURE — 83735 ASSAY OF MAGNESIUM: CPT | Performed by: INTERNAL MEDICINE

## 2021-01-14 PROCEDURE — 80048 BASIC METABOLIC PNL TOTAL CA: CPT | Performed by: INTERNAL MEDICINE

## 2021-01-14 PROCEDURE — 85007 BL SMEAR W/DIFF WBC COUNT: CPT | Performed by: INTERNAL MEDICINE

## 2021-01-14 PROCEDURE — 87636 SARSCOV2 & INF A&B AMP PRB: CPT | Performed by: PHYSICIAN ASSISTANT

## 2021-01-14 PROCEDURE — 85027 COMPLETE CBC AUTOMATED: CPT | Performed by: INTERNAL MEDICINE

## 2021-01-14 PROCEDURE — 25010000002 HEPARIN (PORCINE) PER 1000 UNITS: Performed by: INTERNAL MEDICINE

## 2021-01-14 PROCEDURE — G0378 HOSPITAL OBSERVATION PER HR: HCPCS

## 2021-01-14 PROCEDURE — 96361 HYDRATE IV INFUSION ADD-ON: CPT

## 2021-01-14 PROCEDURE — 83690 ASSAY OF LIPASE: CPT | Performed by: INTERNAL MEDICINE

## 2021-01-14 PROCEDURE — 96372 THER/PROPH/DIAG INJ SC/IM: CPT

## 2021-01-14 RX ORDER — AMLODIPINE BESYLATE 5 MG/1
5 TABLET ORAL
Status: DISCONTINUED | OUTPATIENT
Start: 2021-01-15 | End: 2021-01-14 | Stop reason: HOSPADM

## 2021-01-14 RX ORDER — AMLODIPINE BESYLATE 5 MG/1
5 TABLET ORAL
Qty: 30 TABLET | Refills: 0 | Status: SHIPPED | OUTPATIENT
Start: 2021-01-15

## 2021-01-14 RX ADMIN — HEPARIN SODIUM 5000 UNITS: 5000 INJECTION INTRAVENOUS; SUBCUTANEOUS at 06:45

## 2021-01-14 RX ADMIN — SODIUM CHLORIDE 80 ML/HR: 9 INJECTION, SOLUTION INTRAVENOUS at 01:35

## 2021-01-14 RX ADMIN — LOSARTAN POTASSIUM 25 MG: 25 TABLET, FILM COATED ORAL at 09:14

## 2021-01-14 NOTE — PLAN OF CARE
Problem: Adult Inpatient Plan of Care  Goal: Plan of Care Review  Outcome: Ongoing, Progressing  Goal: Patient-Specific Goal (Individualized)  Outcome: Ongoing, Progressing  Goal: Absence of Hospital-Acquired Illness or Injury  Outcome: Ongoing, Progressing  Intervention: Identify and Manage Fall Risk  Flowsheets  Taken 1/14/2021 1000  Safety Promotion/Fall Prevention:   safety round/check completed   room organization consistent   nonskid shoes/slippers when out of bed   lighting adjusted   fall prevention program maintained  Taken 1/14/2021 0800  Safety Promotion/Fall Prevention:   clutter free environment maintained   fall prevention program maintained   lighting adjusted   nonskid shoes/slippers when out of bed   room organization consistent   safety round/check completed   toileting scheduled  Intervention: Prevent Skin Injury  Flowsheets  Taken 1/14/2021 1000  Body Position: position changed independently  Taken 1/14/2021 0800  Body Position: position changed independently  Intervention: Prevent and Manage VTE (venous thromboembolism) Risk  Flowsheets (Taken 1/14/2021 0800)  VTE Prevention/Management: (sq heparin )   bleeding risk factor(s) identified   other (see comments)  Intervention: Prevent Infection  Flowsheets (Taken 1/14/2021 0800)  Infection Prevention:   visitors restricted/screened   single patient room provided   rest/sleep promoted   personal protective equipment utilized   equipment surfaces disinfected   hand hygiene promoted  Goal: Optimal Comfort and Wellbeing  Outcome: Ongoing, Progressing  Intervention: Provide Person-Centered Care  Flowsheets (Taken 1/14/2021 0800)  Trust Relationship/Rapport:   care explained   choices provided   emotional support provided   empathic listening provided   questions answered   questions encouraged   reassurance provided   thoughts/feelings acknowledged  Goal: Readiness for Transition of Care  Outcome: Ongoing, Progressing  Intervention: Mutually Develop  Transition Plan  Flowsheets  Taken 1/14/2021 1006 by Joann Luther RN  Equipment Needed After Discharge: none  Discharge Coordination/Progress: Uses a cane prn.  Equipment Currently Used at Home: cane, straight  Taken 1/13/2021 0941 by Oneida Marcum, RN  Transportation Anticipated:   car, drives self   family or friend will provide  Patient/Family Anticipated Services at Transition: none  Patient/Family Anticipates Transition to: home with family     Problem: Fluid Imbalance (Pancreatitis)  Goal: Fluid Balance  Outcome: Ongoing, Progressing     Problem: Infection (Pancreatitis)  Goal: Infection Symptom Resolution  Outcome: Ongoing, Progressing     Problem: Nutrition Impaired (Pancreatitis)  Goal: Optimal Nutrition Intake  Outcome: Ongoing, Progressing     Problem: Pain (Pancreatitis)  Goal: Acceptable Pain Control  Outcome: Ongoing, Progressing  Intervention: Monitor and Manage Pain  Flowsheets (Taken 1/13/2021 2107 by Sonya Styles, RN)  Pain Management Interventions: see MAR     Problem: Respiratory Compromise (Pancreatitis)  Goal: Effective Oxygenation and Ventilation  Outcome: Ongoing, Progressing  Intervention: Promote Airway Secretion Clearance  Flowsheets  Taken 1/14/2021 1000  Activity Management:   activity adjusted per tolerance   up ad james  Taken 1/14/2021 0800  Activity Management: up ad james  Cough And Deep Breathing: done independently per patient  Intervention: Optimize Oxygenation and Ventilation  Flowsheets  Taken 1/14/2021 1000  Activity Management:   activity adjusted per tolerance   up ad james  Taken 1/14/2021 0800  Activity Management: up ad james  Cough And Deep Breathing: done independently per patient   Goal Outcome Evaluation:   Patient has no complaints of pain or N/V.  Tolerating full liquid diet appropriately.  VSS, RA. Discharging today.

## 2021-01-14 NOTE — PROGRESS NOTES
Discharge Planning Assessment  [facility]     Patient Name: Alaina Kennedy  MRN: [id number]  Today's Date: 1/14/2021    Admit Date: 1/13/2021    Discharge Needs Assessment     Row Name 01/14/21 1006       Living Environment    Lives With  spouse    Current Living Arrangements  home/apartment/condo    Living Arrangement Comments  Supportive spouse.       Discharge Needs Assessment    Equipment Currently Used at Home  cane, straight    Equipment Needed After Discharge  none    Discharge Coordination/Progress  Uses a cane prn.        Discharge Plan     Row Name 01/14/21 1007       Plan    Plan  Home at DC    Patient/Family in Agreement with Plan  yes    Plan Comments  I spoke with the pt. She denies DC needs at this time.    Final Discharge Disposition Code  01 - home or self-care    Row Name 01/14/21 0838       Plan    Final Discharge Disposition Code  01 - home or self-care        Continued Care and Services - Admitted Since 1/13/2021    Coordination has not been started for this encounter.       Expected Discharge Date and Time     Expected Discharge Date Expected Discharge Time    Jan 16, 2021         Demographic Summary    No documentation.       Functional Status     Row Name 01/14/21 1005       Functional Status    Usual Activity Tolerance  good       Functional Status, IADL    Medications  independent    Meal Preparation  independent    Housekeeping  independent    Laundry  independent    Shopping  independent        Psychosocial    No documentation.       Abuse/Neglect    No documentation.       Legal    No documentation.       Substance Abuse    No documentation.       Patient Forms    No documentation.           Joann Luther RN

## 2021-01-14 NOTE — PLAN OF CARE
Goal Outcome Evaluation:   VSS. NSR. RA. Pain relieved with PO PRN pain medications. Diet advanced to clears this morning per order. Ambulating to the restroom. No further complaints. Will continue to monitor.

## 2021-01-14 NOTE — DISCHARGE SUMMARY
[facility] Medicine Services  DISCHARGE SUMMARY    Patient Name: Alaina Kennedy  : 1955  MRN: [id number]    Date of Admission: 2021  6:23 AM  Date of Discharge:  2021  Primary Care Physician: Ilda Suarez MD    Consults     No orders found for last 30 day(s).          Hospital Course     Presenting Problem:   Acute pancreatitis without infection or necrosis, unspecified pancreatitis type [K85.90]  Acute pancreatitis without infection or necrosis, unspecified pancreatitis type [K85.90]    Active Hospital Problems    Diagnosis  POA   • Acute pancreatitis without infection or necrosis [K85.90]  Yes      Resolved Hospital Problems   No resolved problems to display.      ---------------final diagnoses-----------  Acute pancreatitis (most likely drug induced due to losartan)  Rheumatoid arthritis   -on methotrexate and periodic remicade infusions  HTN  HL  ---------------------------------------------    Hospital Course:  Alaina Kennedy is a 65 y.o. female w/ hx RA (on mtx & periodic remicade infusions), htn, gerd, remote ccy. Losartan was apparently added a couple of months ago and dose was increased within recently. Patient developed epigastric abdominal pain and ultimately presented to [facility] ED where CT a/p revealed previous ccy, mild appearing pancreatitis, normal sized biliary ducts. Lipase was 101. Wbc was 15,000. Afebrile. Lipid panel showed normal triglyceride level. Patient does not drink. Was admitted for pancreatitis and given fluids and conservative management. Patient is now feeling find and wants to go home. Normal wbc count today, afebrile tolerating diet. Will d/c home today. Stopped losartan as most likely culprit as is new medicine. Sulindac is a common culprit of drug induced pancreatitis but hasn't taken this recently apparently. Will start norvasc 5mg daily w/ instructions to check bp twice daily and contact pcp if sbp >180 or persistently > 160 and patient  &  voice understanding.       Discharge Follow Up Recommendations for outpatient labs/diagnostics:   f/u pcp 1 week      Day of Discharge     HPI:   Feels fine. No pain. No dyspnea. No n/v    Review of Systems  No headache, no vision changes    Vital Signs:   Temp:  [97.5 °F (36.4 °C)-98.9 °F (37.2 °C)] 98.9 °F (37.2 °C)  Heart Rate:  [82-95] 95  Resp:  [18-20] 18  BP: (117-140)/(67-77) 117/68     Physical Exam:  Constitutional:Alert, oriented x 3, nontoxic appearing  Psych:Normal/appropriate affect  HEENT:[place]cat, oroph clear  Neck: neck supple, full range of motion  Neuro: Face symmetric, speech clear, equal , moves all extremities  Cardiac: Rrr; No pretibial pitting edema  Resp: Ctab, normal effort  GI: abd soft, nontender  Skin: No extremity rash  Musculoskeletal/extremities: no cyanosis extremities; no significant ankle edema      Pertinent  and/or Most Recent Results     LAB RESULTS:      Lab 01/14/21 0328 01/13/21 0628 01/11/21  1532   WBC 7.69 15.38* 14.44*   HEMOGLOBIN 13.2 15.5 14.7   HEMATOCRIT 41.2 45.6 44.0   PLATELETS 282 328 263   NEUTROS ABS 4.92 11.86* 11.60*   IMMATURE GRANS (ABS)  --  0.08* 0.06*   LYMPHS ABS  --  1.98 1.51   MONOS ABS  --  1.34* 1.12*   EOS ABS 0.23 0.09 0.12   MCV 96.0 93.8 96.5   LACTATE  --  1.1  --          Lab 01/14/21 0328 01/13/21 0628 01/11/21  1532   SODIUM 137 136 135*   POTASSIUM 4.4 4.0 3.9   CHLORIDE 106 99 103   CO2 24.0 23.0 20.0*   ANION GAP 7.0 14.0 12.0   BUN 10 13 11   CREATININE 0.63 0.65 0.64   GLUCOSE 82 119* 117*   CALCIUM 7.9* 10.2 8.8   MAGNESIUM 2.2  --   --    PHOSPHORUS 3.1  --   --          Lab 01/14/21  0328 01/13/21  0628 01/11/21  1532   TOTAL PROTEIN  --  8.0 7.1   ALBUMIN  --  3.90 3.70   GLOBULIN  --  4.1 3.4   ALT (SGPT)  --  41* 25   AST (SGOT)  --  31 21   BILIRUBIN  --  0.5 0.4   ALK PHOS  --  88 66   AMYLASE 38  --   --    LIPASE 51 101* 422*             Lab 01/13/21  0628   CHOLESTEROL 128   LDL CHOL 49   HDL CHOL 65*    TRIGLYCERIDES 66             Brief Urine Lab Results  (Last result in the past 365 days)      Color   Clarity   Blood   Leuk Est   Nitrite   Protein   CREAT   Urine HCG        01/13/21 1101 Yellow Clear Negative Trace Negative Negative             Microbiology Results (last 10 days)     Procedure Component Value - Date/Time    COVID PRE-OP / PRE-PROCEDURE SCREENING ORDER (NO ISOLATION) - Swab, Nasopharynx [[id number]]  (Normal) Collected: 01/14/21 0321    Lab Status: Final result Specimen: Swab from Nasopharynx Updated: 01/14/21 0352    Narrative:      The following orders were created for panel order COVID PRE-OP / PRE-PROCEDURE SCREENING ORDER (NO ISOLATION) - Swab, Nasopharynx.  Procedure                               Abnormality         Status                     ---------                               -----------         ------                     COVID-19 and FLU A/B PCR...[[id number]]  Normal              Final result                 Please view results for these tests on the individual orders.    COVID-19 and FLU A/B PCR - Swab, Nasopharynx [[id number]]  (Normal) Collected: 01/14/21 0321    Lab Status: Final result Specimen: Swab from Nasopharynx Updated: 01/14/21 0352     COVID19 Not Detected     Influenza A PCR Not Detected     Influenza B PCR Not Detected    Narrative:      Fact sheet for providers: https://www.fda.gov/media/411400/download    Fact sheet for patients: https://www.fda.gov/media/938540/download    Test performed by PCR.          Ct Abdomen Pelvis Without Contrast    Result Date: 1/13/2021  CT ABDOMEN AND PELVIS, NONCONTRAST, 1/13/2021 HISTORY: 65-year-old female in the ED complaining of 4 day history left lower quadrant abdomen pain with nausea. TECHNIQUE: CT imaging of the abdomen and pelvis ordered without oral or IV contrast. Radiation dose reduction techniques included automated exposure control. Radiation audit for CT and nuclear cardiology exams in the last 12 months: 0. ABDOMEN  FINDINGS: The head of the pancreas is mildly enlarged and is surrounded by mild peripancreatic soft tissue edema. Body and tail of the pancreas appear normal. The appearance suggests mild acute pancreatitis, and clinical and laboratory correlation is recommended. The gallbladder is surgically absent, and there is no bile duct or pancreatic duct dilatation. Small to moderate-sized hiatal hernia without distal esophageal dilatation or gastric distention. Small bowel and colon are normal in caliber and appearance, as imaged. The appendix is not directly identified. Liver and spleen are normal in size and appearance. Both kidneys are negative with no nephrolithiasis or evidence of urinary obstruction. Normal caliber abdominal aorta. Normal adrenal glands. PELVIS FINDINGS: Hysterectomy. Empty urinary bladder and rectum are within normal limits. Pelvic metal artifact from right hip arthroplasty. No inguinal hernia. Lung base images show no active disease.     1.  CT findings suggesting mild acute pancreatitis involving the pancreatic head. Clinical and laboratory correlation recommended. 2.  Cholecystectomy. No bile duct or pancreatic duct dilatation. 3.  Small to moderate-sized hiatal hernia. 4.  Hysterectomy. Signer Name: Kunal Peterson MD  Signed: 1/13/2021 7:11 AM  Workstation Name: [email or web address]  [facility]    Mri Angiogram Head Without Contrast    Result Date: 1/7/2021  EXAMINATION: MRI BRAIN W WO CONTRAST-, MRI ANGIOGRAM NECK W CONTRAST-, MRI ANGIOGRAM HEAD WO CONTRAST-  INDICATION: Neuro deficit, acute, stroke suspected; dizziness, ataxia after waking up.  TECHNIQUE: Routine multiplanar imaging was obtained of the brain pre and post administration of gadolinium contrast. MR angiographic imaging was obtained of the intracranial vessels centered at the [place] of [place] without the administration of gadolinium contrast. Source and maximum intensity projection images are available for  evaluation. Three-D time of flight angiographic imaging was obtained of the carotid vessels within the neck following the administration of gadolinium contrast. Source and maximum intensity projection images are available for evaluation.  COMPARISON: None.  FINDINGS: There is no evidence of restricted diffusion to suggest evidence of an acute ischemic insult. Flow voids are preserved in the major intracranial vessels. The pituitary and sella are unremarkable. Craniovertebral junction is preserved. No cerebellopontine mass is identified. Some increased signal is seen scattered throughout the periventricular and subcortical white matter to suggest chronic small vessel ischemic change. No hemorrhage or hydrocephalus. No abnormal extraaxial fluid collection is identified. Globes and orbits are intact. The visualized paranasal sinuses are clear. No hemorrhage or hydrocephalus. Postcontrast enhanced imaging reveals no evidence of abnormal contrast enhancement. The visualized vascularity is unremarkable in appearance.  MR angiographic imaging of the carotid vessels within the neck reveal both common carotid arteries to be symmetric in size. The carotid bifurcations are unremarkable with no significant stenosis seen in the common carotid arteries or internal carotid arteries. Vertebral arteries are both normal in caliber. No abnormality is identified.  The intracranial vessels reveals no abnormality identified within the anterior circulation. Anterior cerebral arteries and middle cerebral arteries are without significant stenosis, aneurysmal dilatation or vascular malformation. The posterior circulation is also intact and unremarkable in appearance.      Chronic changes seen within the periventricular and subcortical white matter with no abnormal contrast enhancement. No acute intracranial abnormality. Unremarkable MRA of the head and neck.   D:  01/06/2021 E:  01/06/2021  This report was finalized on 1/7/2021 3:56 PM by  Dr. Gabi Sewell MD.      Mri Angiogram Neck With Contrast    Result Date: 1/7/2021  EXAMINATION: MRI BRAIN W WO CONTRAST-, MRI ANGIOGRAM NECK W CONTRAST-, MRI ANGIOGRAM HEAD WO CONTRAST-  INDICATION: Neuro deficit, acute, stroke suspected; dizziness, ataxia after waking up.  TECHNIQUE: Routine multiplanar imaging was obtained of the brain pre and post administration of gadolinium contrast. MR angiographic imaging was obtained of the intracranial vessels centered at the [place] without the administration of gadolinium contrast. Source and maximum intensity projection images are available for evaluation. Three-D time of flight angiographic imaging was obtained of the carotid vessels within the neck following the administration of gadolinium contrast. Source and maximum intensity projection images are available for evaluation.  COMPARISON: None.  FINDINGS: There is no evidence of restricted diffusion to suggest evidence of an acute ischemic insult. Flow voids are preserved in the major intracranial vessels. The pituitary and sella are unremarkable. Craniovertebral junction is preserved. No cerebellopontine mass is identified. Some increased signal is seen scattered throughout the periventricular and subcortical white matter to suggest chronic small vessel ischemic change. No hemorrhage or hydrocephalus. No abnormal extraaxial fluid collection is identified. Globes and orbits are intact. The visualized paranasal sinuses are clear. No hemorrhage or hydrocephalus. Postcontrast enhanced imaging reveals no evidence of abnormal contrast enhancement. The visualized vascularity is unremarkable in appearance.  MR angiographic imaging of the carotid vessels within the neck reveal both common carotid arteries to be symmetric in size. The carotid bifurcations are unremarkable with no significant stenosis seen in the common carotid arteries or internal carotid arteries. Vertebral arteries are both normal in caliber.  No abnormality is identified.  The intracranial vessels reveals no abnormality identified within the anterior circulation. Anterior cerebral arteries and middle cerebral arteries are without significant stenosis, aneurysmal dilatation or vascular malformation. The posterior circulation is also intact and unremarkable in appearance.      Chronic changes seen within the periventricular and subcortical white matter with no abnormal contrast enhancement. No acute intracranial abnormality. Unremarkable MRA of the head and neck.   D:  01/06/2021 E:  01/06/2021  This report was finalized on 1/7/2021 3:56 PM by Dr. Gabi Sewell MD.      Mri Brain With & Without Contrast    Result Date: 1/7/2021  EXAMINATION: MRI BRAIN W WO CONTRAST-, MRI ANGIOGRAM NECK W CONTRAST-, MRI ANGIOGRAM HEAD WO CONTRAST-  INDICATION: Neuro deficit, acute, stroke suspected; dizziness, ataxia after waking up.  TECHNIQUE: Routine multiplanar imaging was obtained of the brain pre and post administration of gadolinium contrast. MR angiographic imaging was obtained of the intracranial vessels centered at the [place] without the administration of gadolinium contrast. Source and maximum intensity projection images are available for evaluation. Three-D time of flight angiographic imaging was obtained of the carotid vessels within the neck following the administration of gadolinium contrast. Source and maximum intensity projection images are available for evaluation.  COMPARISON: None.  FINDINGS: There is no evidence of restricted diffusion to suggest evidence of an acute ischemic insult. Flow voids are preserved in the major intracranial vessels. The pituitary and sella are unremarkable. Craniovertebral junction is preserved. No cerebellopontine mass is identified. Some increased signal is seen scattered throughout the periventricular and subcortical white matter to suggest chronic small vessel ischemic change. No hemorrhage or hydrocephalus.  No abnormal extraaxial fluid collection is identified. Globes and orbits are intact. The visualized paranasal sinuses are clear. No hemorrhage or hydrocephalus. Postcontrast enhanced imaging reveals no evidence of abnormal contrast enhancement. The visualized vascularity is unremarkable in appearance.  MR angiographic imaging of the carotid vessels within the neck reveal both common carotid arteries to be symmetric in size. The carotid bifurcations are unremarkable with no significant stenosis seen in the common carotid arteries or internal carotid arteries. Vertebral arteries are both normal in caliber. No abnormality is identified.  The intracranial vessels reveals no abnormality identified within the anterior circulation. Anterior cerebral arteries and middle cerebral arteries are without significant stenosis, aneurysmal dilatation or vascular malformation. The posterior circulation is also intact and unremarkable in appearance.      Chronic changes seen within the periventricular and subcortical white matter with no abnormal contrast enhancement. No acute intracranial abnormality. Unremarkable MRA of the head and neck.   D:  01/06/2021 E:  01/06/2021  This report was finalized on 1/7/2021 3:56 PM by Dr. Gabi Sewell MD.      Xr Chest 1 View    Result Date: 1/7/2021  EXAMINATION: XR CHEST 1 VW-  INDICATION: Stroke protocol (onset > 12 hrs).  COMPARISON: None.  FINDINGS: Portable chest reveals cardiac and mediastinal silhouettes within normal limits. Lung fields are grossly clear. No focal parenchymal opacification is present.  No pleural effusion or pneumothorax. Degenerative change is seen within the spine. Pulmonary vascularity is within normal limits.         No acute cardiopulmonary disease.  D:  01/06/2021 E:  01/06/2021  This report was finalized on 1/7/2021 3:56 PM by Dr. Gabi Sewell MD.                  Results for orders placed during the hospital encounter of 09/13/17   Adult Transthoracic  Echo Complete    Narrative · Left ventricular systolic function is normal. Estimated EF = 60%.  · Left ventricular diastolic dysfunction (grade I) consistent with   impaired relaxation.  · RVSP(TR) 10.6 mmHg          Plan for Follow-up of Pending Labs/Results: pcp    Discharge Details        Discharge Medications      New Medications      Instructions Start Date   amLODIPine 5 MG tablet  Commonly known as: NORVASC   5 mg, Oral, Every 24 Hours Scheduled   Start Date: January 15, 2021        Continue These Medications      Instructions Start Date   cyclobenzaprine 10 MG tablet  Commonly known as: FLEXERIL   10 mg, Oral, Every Night at Bedtime      folic acid 1 MG tablet  Commonly known as: FOLVITE   5 mg, Oral, Daily      meclizine 25 MG tablet  Commonly known as: ANTIVERT   25 mg, Oral, 3 Times Daily PRN      methotrexate 2.5 MG tablet   20 mg, Oral, Weekly, Taken on Sundays      omeprazole 40 MG capsule  Commonly known as: priLOSEC   20 mg, Oral, Daily      ONE-A-DAY WOMENS FORMULA PO   One-A-Day Womens Formula 18 mg iron-400 mcg-500 mg Ca tablet      REMICADE IV   Intravenous, Every 8 weeks, next dose due 1/15/2020      rosuvastatin 10 MG tablet  Commonly known as: CRESTOR   10 mg, Oral, Daily         Stop These Medications    losartan 25 MG tablet  Commonly known as: COZAAR     sulindac 200 MG tablet  Commonly known as: CLINORIL            Allergies   Allergen Reactions   • Quinolones    • Sulfa Antibiotics Rash         Discharge Disposition:  Home or Self Care    Diet:  Hospital:  Diet Order   Procedures   • Diet Full Liquid       Activity:           CODE STATUS:    Code Status and Medical Interventions:   Ordered at: 01/13/21 1136     Code Status:    CPR     Medical Interventions (Level of Support Prior to Arrest):    Full       Future Appointments   Date Time Provider Department Center   1/20/2021  9:10 AM [facility]  [facility]   2/2/2021  3:00 PM [facility] [place] [facility]  [facility]  [facility]    2/17/2021  9:30 AM Arash Caceres MD [facility] None       Additional Instructions for the Follow-ups that You Need to Schedule     Discharge Follow-up with PCP   As directed       Currently Documented PCP:    Ilda Suarez MD    PCP Phone Number:    [phone]     Follow Up Details: 1 week                     Zaki Pedroza MD  01/14/21      Time Spent on Discharge:  I spent  35  minutes on this discharge activity which included: face-to-face encounter with the patient, reviewing the data in the system, coordination of the care with the nursing staff as well as consultants, documentation, and entering orders.

## 2021-01-19 ENCOUNTER — DOCUMENTATION (OUTPATIENT)
Dept: CARDIOLOGY | Facility: HOSPITAL | Age: 66
End: 2021-01-19

## 2021-01-20 ENCOUNTER — IMMUNIZATION (OUTPATIENT)
Dept: VACCINE CLINIC | Facility: HOSPITAL | Age: 66
End: 2021-01-20

## 2021-01-20 PROCEDURE — 0001A: CPT | Performed by: INTERNAL MEDICINE

## 2021-01-20 PROCEDURE — 91300 HC SARSCOV02 VAC 30MCG/0.3ML IM: CPT | Performed by: INTERNAL MEDICINE

## 2021-01-21 ENCOUNTER — DOCUMENTATION (OUTPATIENT)
Dept: CARDIOLOGY | Facility: HOSPITAL | Age: 66
End: 2021-01-21

## 2021-01-21 NOTE — PROGRESS NOTES
[facility] Heart Monitor Documentation    Alaina Kennedy  1955  [id number]  01/21/21    [employer]    [] ZIO XT Patch  Model [id number] Prescribed for N/A Days    · Serial Number: (N + 9 Digits) N   · Apply-By Date on Box:   · USPS Tracking Number:   · USPS Tracking        [] [employer] MINI PLUS Mobile Cardiac Telemetry  Model BGMINIPLUS Prescribed for N/A Days    · Serial Number: (BGM + 7 Digits) BGM  · Shipped-By Date on Box:   · UPS Tracking Number: [id number]  · UPS Tracking      [] [employer] MINI Holter Monitor  Model BGMINIEL Prescribed for 14 Days    · Serial Number: (7 Digits) [id number]  · Shipped-By Date on Box: [id number]  · UPS Tracking Number: [id number]  · UPS Tracking        This monitor was applied to the patient's chest and checked for proper functioning.  Ms. Alaina Kennedy was instructed in the proper use of this monitor.  She was given the opportunity to ask questions and left the office with the device 's instruction manual.    Lis Persaud MA, 13:36 EST, 01/21/21                  [facility] 8.8.2019

## 2021-02-02 ENCOUNTER — APPOINTMENT (OUTPATIENT)
Dept: CARDIOLOGY | Facility: HOSPITAL | Age: 66
End: 2021-02-02

## 2021-02-09 PROCEDURE — 93248 EXT ECG>7D<15D REV&INTERPJ: CPT | Performed by: INTERNAL MEDICINE

## 2021-02-10 ENCOUNTER — IMMUNIZATION (OUTPATIENT)
Dept: VACCINE CLINIC | Facility: HOSPITAL | Age: 66
End: 2021-02-10

## 2021-02-10 PROCEDURE — 0002A: CPT | Performed by: INTERNAL MEDICINE

## 2021-02-10 PROCEDURE — 91300 HC SARSCOV02 VAC 30MCG/0.3ML IM: CPT | Performed by: INTERNAL MEDICINE

## 2021-02-10 NOTE — PROGRESS NOTES
Subjective:     Encounter Date:02/17/2021    Patient ID: Alaina Kennedy is a 65 y.o.  white female from [place], [place], retired as a  at [employer].    REFERRING PROVIDER: BJ Mosquera  PHYSICIAN: Ilda Suarez MD   NEUROLOGIST: BJ Steven  RHEUMATOLOGIST: Giuliano Mckoy MD  ORTHOPEDIC SURGEON: Reece Vaz MD    Chief Complaint:   Chief Complaint   Patient presents with   • Hypertension     Consult and cardiac clearance   • Palpitations     Problem List:  1. Pre-Syncope, likely vasovagal  a. Echocardiogram 9/13/2017: LVEF 60%, LV diastolic dysfunction grade 1 consistent with impaired relaxation, RVSP 10.6 mmHg  b. Regadenoson stress test 10/05/2017: Test is negative for anginal symptoms or diagnostic ST segment changes, LVEF greater than 70%, myocardial perfusion imaging indicates a normal myocardial perfusion study with no evidence of ischemia  c. [facility] ED January 2021 presyncope, hypertension in the setting of acute pancreatitis  d. MRA head/neck 01/06/2021: Chronic changes seen within the periventricular and subcortical white matter with no abnormal contrast-enhancement.  No acute intracranial abnormality.  Unremarkable MRA of the head and neck  e. MRI brain 01/06/2021:Chronic changes seen within the periventricular and subcortical white matter with no abnormal contrast-enhancement.  No acute intracranial abnormality.  Unremarkable MRA of the head and neck  f. Echocardiogram 02/14/2021: LVEF 68%, RVSP less than 35 mmHg, normal RV cavity size, wall thickness, systolic function and septal motion noted.  Aortic valve exhibits mild sclerosis  g. Residual class I symptoms with normal electrocardiogram, February 2021.  2. Palpitations with abnormal event monitor January 2021, with preliminary demonstrating brief asymptomatic PSVT, no atrial fibrillation/flutter, AV block, or ventricular tachycardia; palpitations have ceased since decreasing caffeine consumption  February 2021  3. Dizziness  4. Hypertension  5. Hyperlipidemia; on statin therapy  6. GERD/hiatal hernia  7. Rheumatoid arthritis  8. Essential tremor  9. Peripheral neuropathy  10. History of Bell's palsy 2017  11. [facility] 2-day hospitalization January 2021 for acute pancreatitis  12. Surgical history:  a. Hysterectomy  b. Cholecystectomy  c. Left foot surgery  d. Right hip replacement    Allergies   Allergen Reactions   • Quinolones    • Sulfa Antibiotics Rash         Current Outpatient Medications:   •  amLODIPine (NORVASC) 5 MG tablet, Take 1 tablet by mouth Daily., Disp: 30 tablet, Rfl: 0  •  cyclobenzaprine (FLEXERIL) 10 MG tablet, Take 10 mg by mouth every night at bedtime., Disp: , Rfl:   •  folic acid (FOLVITE) 1 MG tablet, Take 5 mg by mouth Daily., Disp: , Rfl:   •  InFLIXimab (REMICADE IV), Infuse  into a venous catheter. Every 8 weeks, next dose due 1/15/2020, Disp: , Rfl:   •  methotrexate 2.5 MG tablet, Take 20 mg by mouth 1 (One) Time Per Week. Taken on Sundays, Disp: , Rfl:   •  Multiple Vitamins-Calcium (ONE-A-DAY WOMENS FORMULA PO), One-A-Day Womens Formula 18 mg iron-400 mcg-500 mg Ca tablet, Disp: , Rfl:   •  omeprazole (priLOSEC) 40 MG capsule, Take 20 mg by mouth Daily., Disp: , Rfl: 5  •  rosuvastatin (CRESTOR) 10 MG tablet, Take 10 mg by mouth Daily., Disp: , Rfl:   •  Sod Picosulfate-Mag Ox-Cit Acd (Clenpiq) 10-3.5-12 MG-GM -GM/160ML solution, Clenpiq 10 mg-3.5 gram-12 gram/160 mL oral solution  AS DIRECTED, Disp: , Rfl:   •  Suprep Bowel Prep Kit 17.5-3.13-1.6 GM/177ML solution oral solution, , Disp: , Rfl:     History of Present Illness  This is a 65-year-old white female who presents to [facility] care to get cardiac clearance for right hip surgery.  She had a 30-hour [facility] hospitalization for acute pancreatitis in January 2021.  She was having abdominal discomfort and was dizzy and felt that she may pass out.  She has never had any episodes of this before or since this isolated event.  She  went to the ER and was found to have pancreatitis.  Her blood pressure was around 180 systolic and her losartan was increased to 100 mg daily.  Apparently the day that she went to the ER it was very crowded and she wound up leaving and coming back 2 days later with recurrent abdominal discomfort.  She had a cough with lisinopril and was told that the increased dose of losartan caused her to have pancreatitis.  She is now on amlodipine and states that she feels better on this medication than the other two anyway.  She has known hyperlipidemia but her lipid panel in January 2021 was excellent.  She has been trying to make healthier food choices since the Christmas holidays.  She was having a lot of palpitations around that time because she was drinking a lot of Jackie 8.  Since she has stopped drinking so much caffeine, she has not noticed any more palpitations.  She denies any syncope, chest pain, shortness of breath, dizziness, and it is normally very active and can do all of her activities without any complaints.  She denies any prior MIs, stress tests, heart catheterizations, CVAs, TIAs, seizures, DVTs, PEs, orthopnea, PND, claudication, thyroid dysfunction, family history of early CAD, smoking history, diabetes mellitus, PIH, preeclampsia, or rheumatic fever.  She was told when she was 19 years old that she has a small heart murmur.  She had an echocardiogram around 4 years ago when her primary care physician heard the murmur again.  She wore an event monitor for 2 weeks and is awaiting the results with preliminary results as outlined below.  She denies any snoring, apneic spells, daytime sleepiness, or frequent awakening.  The patient has noticed increased constipation over the past 6 months and she is scheduled to have a colonoscopy 2/25/2021 with Dr. Mascorro.  Her right repeat hip replacement is scheduled 05/04/2021 with Dr. Vaz.  The patient's blood pressure at home is normally around  120/70.    Cardiovascular Disease Risk Factors  Hypertension, increased age, hyperlipidemia    Social History     Socioeconomic History   • Marital status:      Spouse name: Not on file   • Number of children: 2   • Years of education: Not on file   • Highest education level: Not on file   Tobacco Use   • Smoking status: Never Smoker   • Smokeless tobacco: Never Used   Substance and Sexual Activity   • Alcohol use: No   • Drug use: No   • Sexual activity: Defer   Social History Narrative    Caffeine: 1 tj 8 monthly        Family History   Problem Relation Age of Onset   • Lung cancer Father    • Cancer Maternal Grandmother    • Stroke Maternal Grandfather    • Kidney disease Mother    • Lung disease Mother    • Arrhythmia Mother    • No Known Problems Sister    • Rheum arthritis Paternal Grandmother    • No Known Problems Sister    • No Known Problems Half-Sister    • No Known Problems Daughter    • No Known Problems Son        Review of Systems   Constitution: Negative.   HENT: Negative.    Eyes: Negative.    Cardiovascular: Positive for near-syncope (with pancreatitis). Negative for chest pain, claudication, dyspnea on exertion, irregular heartbeat, leg swelling, orthopnea, palpitations and syncope.   Respiratory: Negative for shortness of breath, sleep disturbances due to breathing and snoring.    Endocrine: Negative.    Hematologic/Lymphatic: Negative.    Skin: Negative.    Musculoskeletal: Positive for arthritis.   Gastrointestinal: Positive for change in bowel habit and constipation.        Pancreatitis January 2021   Genitourinary: Negative.    Neurological: Negative for excessive daytime sleepiness, dizziness, focal weakness, seizures and weakness.        Essential tremor, history of Bell's palsy   Psychiatric/Behavioral: Negative.    Allergic/Immunologic: Positive for environmental allergies.      Obtained and negative except as outlined in problem list and HPI.      ECG 12 Lead    Date/Time:  "2/17/2021 9:53 AM  Performed by: Arash Caceres MD  Authorized by: Arash Caceres MD   Rhythm comments: Normal sinus rhythm, low voltage QRS, borderline ECG, 82 bpm,  ms, QRS 76 ms,  ms, PACs no longer present compared to ECG January 2021                 Objective:       Vitals:    02/17/21 0849 02/17/21 0859 02/17/21 0900 02/17/21 0901   BP: 134/85 137/87 130/90 137/87   BP Location: Right arm Right arm Left arm Left arm   Patient Position: Sitting Standing Standing Sitting   Pulse: 91 87 87 87   SpO2: 96%      Weight: 67.6 kg (149 lb)      Height: 162 cm (63.78\")        Body mass index is 25.75 kg/m².    Constitutional:       Appearance: Healthy appearance. Not in distress.   Eyes:      Funduscopic exam:     Right eye: AV nicking present. No hemorrhage or exudate.         Left eye: AV nicking present. No hemorrhage or exudate.   HENT:    Mouth/Throat:      Lips: Pink.      Mouth: Mucous membranes are moist. No injury, lacerations, oral lesions or angioedema.      Dentition: Normal dentition. Does not have dentures. No dental tenderness, gingival swelling, dental caries, dental abscesses or gum lesions.      Tongue: No lesions. Tongue does not deviate from midline.      Palate: No mass and lesions.      Pharynx: Oropharynx is clear. Uvula midline. No pharyngeal swelling, oropharyngeal exudate, posterior oropharyngeal erythema or uvula swelling.      Tonsils: No tonsillar exudate or tonsillar abscesses.   Neck:      Vascular: No JVR. JVD normal.   Pulmonary:      Effort: Pulmonary effort is normal.      Breath sounds: Normal breath sounds. No wheezing. No rhonchi. No rales.   Chest:      Chest wall: Not tender to palpatation.   Cardiovascular:      PMI at left midclavicular line. Normal rate. Regular rhythm. Normal S1. Normal S2.      Murmurs: There is a grade 1/6 mid frequency harsh early systolic murmur at the LLSB.      No gallop. No click. No rub.   Pulses:     Carotid: 1+ bilaterally.     " Radial: 1+ bilaterally.     Femoral: 1+ bilaterally.     Dorsalis pedis: 1+ bilaterally.     Posterior tibial: 1+ bilaterally.  Edema:     Peripheral edema absent.   Abdominal:      General: Bowel sounds are normal.      Palpations: Abdomen is soft.      Tenderness: There is no abdominal tenderness.   Musculoskeletal: Normal range of motion.         General: No tenderness.   Skin:     General: Skin is warm and dry.   Neurological:      General: No focal deficit present.      Mental Status: Alert and oriented to person, place and time.         Lab Review:   Results for orders placed or performed during the hospital encounter of 02/14/21   Adult Transthoracic Echo Complete W/ Cont if Necessary Per Protocol   Result Value Ref Range    Target HR (85%) 132 bpm    Max. Pred. HR (100%) 155 bpm    RV S' 16.80 cm/sec    RV Base 2.90 cm    RV Length 4.80 cm    RV Mid 2.40 cm    Ascending aorta 2.8 cm    LVPWd 1.0 cm    RAP systole 3 mmHg    RVSP(TR) 27 mmHg    TR max PG 24 mmHg    IVSd 1.0 cm    LA dimension 3.6 cm    LVIDd 4.0 cm    LVIDs 2.5 cm    TR max tonya 244 cm/sec    TAPSE (>1.6) 2.60 cm    Echo EF Estimated 68 %    EF(MOD-bp) 67 %     MRI brain 01/06/2021:  Chronic changes seen within the periventricular and subcortical white matter with no abnormal contrast enhancement. No acute intracranial abnormality. Unremarkable MRA of the head and neck.    MRA head/neck 01/06/2021:  Chronic changes seen within the periventricular and  subcortical white matter with no abnormal contrast enhancement. No acute  intracranial abnormality. Unremarkable MRA of the head and neck.    CT abdomen/pelvis 1/13/2021:  1.  CT findings suggesting mild acute pancreatitis involving the pancreatic head. Clinical and laboratory correlation recommended.  2.  Cholecystectomy. No bile duct or pancreatic duct dilatation.  3.  Small to moderate-sized hiatal hernia.  4.  Hysterectomy      Chest x-ray 01/06/2021:   No acute cardiopulmonary  disease    Echocardiogram 2/14/2021:  · Estimated left ventricular EF = 68% Estimated left ventricular EF was in agreement with the calculated left ventricular EF. Left ventricular ejection fraction appears to be 66 - 70%. Left ventricular systolic function is normal.  · Estimated right ventricular systolic pressure from tricuspid regurgitation is normal (<35 mmHg).  · Left ventricular diastolic function was normal.  · Normal right ventricular cavity size, wall thickness, systolic function and septal motion noted.  · The aortic valve exhibits mild sclerosis.  · No evidence of pulmonary hypertension is present.  · There is no evidence of pericardial effusion.    01/14/2021:  · BMP: Glucose 82, BUN 10, creatinine 0.63, sodium 137, potassium 4.4, chloride 106, carbon dioxide 24, calcium 7.9, GFR 95  · CBC: Neutrophils 64, lymphocytes 23, monocytes 6, eos 3, basos 1, WBC 7.69, RBC 4.29, hemoglobin 13.2, hematocrit 41.2, MCV 96, MCH 30.8, MCHC 32, RDW 12.4, MPV 9.2, platelets 282  · Amylase 38  · Lipase 51  · Magnesium 2.2  · Phosphorus 3.1    01/13/2021:  · Lipid panel: Cholesterol 128, triglycerides 66, HDL 65, LDL 49    Assessment:   Patient with intermittent palpitations (now resolved with decreasing caffeine consumption) and isolated presyncopal episode with preliminary demonstrating brief asymptomatic PSVT, no atrial fibrillation/flutter, AV block, or ventricular tachycardia. Her MRI brain and MRA head/neck were unremarkable.  Her presyncopal episode with an isolated event occurred in the setting of acute pancreatitis.  We will review her final event monitor results when available.  Her echocardiogram was acceptable February 2021. The patient has cardiac clearance to have her right repeat hip replacement surgery with Dr. Vaz. Would defer MPS/C.     Diagnosis Plan   1. Near syncope  Preliminary demonstrating brief asymptomatic PSVT, no atrial fibrillation/flutter, AV block, or ventricular tachycardia.    2. Palpitations  Preliminary demonstrating brief asymptomatic PSVT, no atrial fibrillation/flutter, AV block, or ventricular tachycardia.   3. Dizziness  +/-Meclizine 25mg tid PRN for recurrent dizziness   4. Essential hypertension  Controlled, continue current cardiac medications   5. Hyperlipidemia LDL goal <100  Acceptable lipid panel January 2021, continue rosuvastatin          Plan:       1. Patient to continue current medications and close follow up with the above providers with the following recommendations:   2. Review final event monitor results when available  3. Tentative cardiology follow up in July 2021 or patient may return sooner PRN  4. Cardiac clearance to have repeat right hip replacement surgery 05/04/2021 with Dr. Vaz  5. 1 800 card    Scribed for Arash Caceres MD by Pilar Harrington, BJ. 2/17/2021  09:57 KAYLENE CARSON, Arash Caceres MD, [facility], personally performed the services described in this documentation as scribed by the above named individual in my presence, and it is both accurate and complete. At 10:11 EST on 02/17/2021

## 2021-02-14 ENCOUNTER — HOSPITAL ENCOUNTER (OUTPATIENT)
Dept: CARDIOLOGY | Facility: HOSPITAL | Age: 66
Discharge: HOME | End: 2021-02-14
Admitting: NURSE PRACTITIONER

## 2021-02-14 VITALS — HEIGHT: 64 IN | WEIGHT: 145 LBS | BODY MASS INDEX: 24.75 KG/M2

## 2021-02-14 DIAGNOSIS — R00.2 PALPITATIONS: ICD-10-CM

## 2021-02-14 DIAGNOSIS — R42 DIZZINESS: ICD-10-CM

## 2021-02-14 DIAGNOSIS — R55 NEAR SYNCOPE: ICD-10-CM

## 2021-02-14 PROCEDURE — 93306 TTE W/DOPPLER COMPLETE: CPT | Performed by: INTERNAL MEDICINE

## 2021-02-14 PROCEDURE — 93306 TTE W/DOPPLER COMPLETE: CPT

## 2021-02-15 LAB
ASCENDING AORTA: 2.8 CM
BH CV ECHO MEAS - EF(MOD-BP): 67 %
BH CV ECHO MEAS - IVSD: 1 CM
BH CV ECHO MEAS - LA DIMENSION: 3.6 CM
BH CV ECHO MEAS - LVIDD: 4 CM
BH CV ECHO MEAS - LVIDS: 2.5 CM
BH CV ECHO MEAS - LVPWD: 1 CM
BH CV ECHO MEAS - RAP SYSTOLE: 3 MMHG
BH CV ECHO MEAS - RVSP: 27 MMHG
BH CV ECHO MEAS - TAPSE (>1.6): 2.6 CM
BH CV ECHO MEAS - TR MAX PG: 24 MMHG
BH CV ECHO MEAS - TR MAX VEL: 244 CM/SEC
BH CV XLRA - RV BASE: 2.9 CM
BH CV XLRA - RV LENGTH: 4.8 CM
BH CV XLRA - RV MID: 2.4 CM
BH CV XLRA - TDI S': 16.8 CM/SEC
LV EF 2D ECHO EST: 68 %
MAXIMAL PREDICTED HEART RATE: 155 BPM
STRESS TARGET HR: 132 BPM

## 2021-02-16 PROBLEM — R55 NEAR SYNCOPE: Status: ACTIVE | Noted: 2021-02-16

## 2021-02-17 ENCOUNTER — CONSULT (OUTPATIENT)
Dept: CARDIOLOGY | Facility: CLINIC | Age: 66
End: 2021-02-17

## 2021-02-17 VITALS
SYSTOLIC BLOOD PRESSURE: 137 MMHG | HEART RATE: 87 BPM | DIASTOLIC BLOOD PRESSURE: 87 MMHG | OXYGEN SATURATION: 96 % | HEIGHT: 64 IN | BODY MASS INDEX: 25.44 KG/M2 | WEIGHT: 149 LBS

## 2021-02-17 DIAGNOSIS — R00.2 PALPITATIONS: ICD-10-CM

## 2021-02-17 DIAGNOSIS — R42 DIZZINESS: ICD-10-CM

## 2021-02-17 DIAGNOSIS — R55 NEAR SYNCOPE: Primary | ICD-10-CM

## 2021-02-17 DIAGNOSIS — E78.5 HYPERLIPIDEMIA LDL GOAL <100: ICD-10-CM

## 2021-02-17 DIAGNOSIS — I10 ESSENTIAL HYPERTENSION: ICD-10-CM

## 2021-02-17 PROCEDURE — 93000 ELECTROCARDIOGRAM COMPLETE: CPT | Performed by: INTERNAL MEDICINE

## 2021-02-17 PROCEDURE — 99204 OFFICE O/P NEW MOD 45 MIN: CPT | Performed by: INTERNAL MEDICINE

## 2021-02-17 RX ORDER — SODIUM, POTASSIUM,MAG SULFATES 17.5-3.13G
SOLUTION, RECONSTITUTED, ORAL ORAL
COMMUNITY
Start: 2021-02-05 | End: 2022-02-25

## 2021-02-17 RX ORDER — SODIUM PICOSULFATE, MAGNESIUM OXIDE, AND ANHYDROUS CITRIC ACID 10; 3.5; 12 MG/160ML; G/160ML; G/160ML
LIQUID ORAL
COMMUNITY
End: 2022-02-25

## 2021-05-13 ENCOUNTER — HOSPITAL ENCOUNTER (EMERGENCY)
Facility: HOSPITAL | Age: 66
Discharge: HOME | End: 2021-05-13
Attending: EMERGENCY MEDICINE | Admitting: EMERGENCY MEDICINE

## 2021-05-13 ENCOUNTER — APPOINTMENT (OUTPATIENT)
Dept: CT IMAGING | Facility: HOSPITAL | Age: 66
End: 2021-05-13

## 2021-05-13 VITALS
BODY MASS INDEX: 25.61 KG/M2 | OXYGEN SATURATION: 98 % | SYSTOLIC BLOOD PRESSURE: 136 MMHG | HEART RATE: 76 BPM | WEIGHT: 150 LBS | DIASTOLIC BLOOD PRESSURE: 87 MMHG | RESPIRATION RATE: 18 BRPM | TEMPERATURE: 98 F | HEIGHT: 64 IN

## 2021-05-13 DIAGNOSIS — R10.9 ACUTE ABDOMINAL PAIN: ICD-10-CM

## 2021-05-13 DIAGNOSIS — Z98.890 STATUS POST SURGERY: ICD-10-CM

## 2021-05-13 DIAGNOSIS — K62.89 RECTAL PAIN: ICD-10-CM

## 2021-05-13 DIAGNOSIS — F11.90 OPIOID USE: ICD-10-CM

## 2021-05-13 DIAGNOSIS — K59.00 CONSTIPATION, UNSPECIFIED CONSTIPATION TYPE: Primary | ICD-10-CM

## 2021-05-13 LAB
ALBUMIN SERPL-MCNC: 3.4 G/DL (ref 3.5–5.2)
ALBUMIN/GLOB SERPL: 1.1 G/DL
ALP SERPL-CCNC: 110 U/L (ref 39–117)
ALT SERPL W P-5'-P-CCNC: 59 U/L (ref 1–33)
ANION GAP SERPL CALCULATED.3IONS-SCNC: 11 MMOL/L (ref 5–15)
AST SERPL-CCNC: 48 U/L (ref 1–32)
BASOPHILS # BLD AUTO: 0.03 10*3/MM3 (ref 0–0.2)
BASOPHILS NFR BLD AUTO: 0.3 % (ref 0–1.5)
BILIRUB SERPL-MCNC: 0.3 MG/DL (ref 0–1.2)
BUN SERPL-MCNC: 23 MG/DL (ref 8–23)
BUN/CREAT SERPL: 31.1 (ref 7–25)
CALCIUM SPEC-SCNC: 8.8 MG/DL (ref 8.6–10.5)
CHLORIDE SERPL-SCNC: 102 MMOL/L (ref 98–107)
CO2 SERPL-SCNC: 24 MMOL/L (ref 22–29)
CREAT SERPL-MCNC: 0.74 MG/DL (ref 0.57–1)
DEPRECATED RDW RBC AUTO: 43.8 FL (ref 37–54)
EOSINOPHIL # BLD AUTO: 0.07 10*3/MM3 (ref 0–0.4)
EOSINOPHIL NFR BLD AUTO: 0.6 % (ref 0.3–6.2)
ERYTHROCYTE [DISTWIDTH] IN BLOOD BY AUTOMATED COUNT: 12.9 % (ref 12.3–15.4)
GFR SERPL CREATININE-BSD FRML MDRD: 79 ML/MIN/1.73
GLOBULIN UR ELPH-MCNC: 3 GM/DL
GLUCOSE SERPL-MCNC: 111 MG/DL (ref 65–99)
HCT VFR BLD AUTO: 37.9 % (ref 34–46.6)
HGB BLD-MCNC: 12.8 G/DL (ref 12–15.9)
IMM GRANULOCYTES # BLD AUTO: 0.07 10*3/MM3 (ref 0–0.05)
IMM GRANULOCYTES NFR BLD AUTO: 0.6 % (ref 0–0.5)
LIPASE SERPL-CCNC: 18 U/L (ref 13–60)
LYMPHOCYTES # BLD AUTO: 1.13 10*3/MM3 (ref 0.7–3.1)
LYMPHOCYTES NFR BLD AUTO: 10 % (ref 19.6–45.3)
MCH RBC QN AUTO: 31.4 PG (ref 26.6–33)
MCHC RBC AUTO-ENTMCNC: 33.8 G/DL (ref 31.5–35.7)
MCV RBC AUTO: 93.1 FL (ref 79–97)
MONOCYTES # BLD AUTO: 0.77 10*3/MM3 (ref 0.1–0.9)
MONOCYTES NFR BLD AUTO: 6.8 % (ref 5–12)
NEUTROPHILS NFR BLD AUTO: 81.7 % (ref 42.7–76)
NEUTROPHILS NFR BLD AUTO: 9.27 10*3/MM3 (ref 1.7–7)
NRBC BLD AUTO-RTO: 0 /100 WBC (ref 0–0.2)
PLATELET # BLD AUTO: 356 10*3/MM3 (ref 140–450)
PMV BLD AUTO: 8.8 FL (ref 6–12)
POTASSIUM SERPL-SCNC: 4.3 MMOL/L (ref 3.5–5.2)
PROT SERPL-MCNC: 6.4 G/DL (ref 6–8.5)
RBC # BLD AUTO: 4.07 10*6/MM3 (ref 3.77–5.28)
SODIUM SERPL-SCNC: 137 MMOL/L (ref 136–145)
WBC # BLD AUTO: 11.34 10*3/MM3 (ref 3.4–10.8)

## 2021-05-13 PROCEDURE — 74176 CT ABD & PELVIS W/O CONTRAST: CPT

## 2021-05-13 PROCEDURE — 83690 ASSAY OF LIPASE: CPT | Performed by: EMERGENCY MEDICINE

## 2021-05-13 PROCEDURE — 80053 COMPREHEN METABOLIC PANEL: CPT | Performed by: EMERGENCY MEDICINE

## 2021-05-13 PROCEDURE — 99283 EMERGENCY DEPT VISIT LOW MDM: CPT

## 2021-05-13 PROCEDURE — 85025 COMPLETE CBC W/AUTO DIFF WBC: CPT | Performed by: EMERGENCY MEDICINE

## 2021-05-13 RX ORDER — HYDROCODONE BITARTRATE AND ACETAMINOPHEN 10; 325 MG/1; MG/1
1 TABLET ORAL EVERY 6 HOURS PRN
COMMUNITY
End: 2022-02-25

## 2021-05-13 RX ORDER — DIAPER,BRIEF,INFANT-TODD,DISP
EACH MISCELLANEOUS 2 TIMES DAILY
Qty: 100 G | Refills: 0 | Status: SHIPPED | OUTPATIENT
Start: 2021-05-13 | End: 2022-08-26

## 2021-05-13 RX ORDER — MINERAL OIL 100 G/100G
1 OIL RECTAL ONCE
Status: COMPLETED | OUTPATIENT
Start: 2021-05-13 | End: 2021-05-13

## 2021-05-13 RX ADMIN — MINERAL OIL 1 ENEMA: 100 ENEMA RECTAL at 02:26

## 2021-05-26 ENCOUNTER — LAB (OUTPATIENT)
Dept: LAB | Facility: HOSPITAL | Age: 66
End: 2021-05-26

## 2021-05-26 ENCOUNTER — TRANSCRIBE ORDERS (OUTPATIENT)
Dept: LAB | Facility: HOSPITAL | Age: 66
End: 2021-05-26

## 2021-05-26 DIAGNOSIS — L08.0 PYODERMA: Primary | ICD-10-CM

## 2021-05-26 DIAGNOSIS — L08.0 PYODERMA: ICD-10-CM

## 2021-05-26 PROCEDURE — 87070 CULTURE OTHR SPECIMN AEROBIC: CPT

## 2021-05-26 PROCEDURE — 87205 SMEAR GRAM STAIN: CPT

## 2021-05-28 LAB
BACTERIA SPEC AEROBE CULT: NORMAL
GRAM STN SPEC: NORMAL

## 2021-07-16 NOTE — PROGRESS NOTES
Subjective:     Encounter Date:07/21/2021    Patient ID: Alaina Kennedy is a 66 y.o.  white female from [place], [place], retired as a  at [employer].    REFERRING PROVIDER: BJ Mosquera  PHYSICIAN: Ilda Suarez MD   NEUROLOGIST: BJ Steven  RHEUMATOLOGIST: Giuliano Mckoy MD  ORTHOPEDIC SURGEON: Reece Vaz MD    Chief Complaint: No chief complaint on file.      Problem List:  1. Pre-Syncope, likely vasovagal  a. Echocardiogram 9/13/2017: LVEF 60%, LV diastolic dysfunction grade 1 consistent with impaired relaxation, RVSP 10.6 mmHg  b. Regadenoson stress test 10/05/2017: Test is negative for anginal symptoms or diagnostic ST segment changes, LVEF greater than 70%, myocardial perfusion imaging indicates a normal myocardial perfusion study with no evidence of ischemia  c. [facility] ED January 2021 presyncope, hypertension in the setting of acute pancreatitis  d. MRA head/neck 01/06/2021: Chronic changes seen within the periventricular and subcortical white matter with no abnormal contrast-enhancement.  No acute intracranial abnormality.  Unremarkable MRA of the head and neck  e. MRI brain 01/06/2021:Chronic changes seen within the periventricular and subcortical white matter with no abnormal contrast-enhancement.  No acute intracranial abnormality.  Unremarkable MRA of the head and neck  f. Echocardiogram 02/14/2021: LVEF 68%, RVSP less than 35 mmHg, normal RV cavity size, wall thickness, systolic function and septal motion noted.  Aortic valve exhibits mild sclerosis  g. Residual class I symptoms with normal electrocardiogram with subsequent acceptable event monitor, February 2021, July 2021  2. Palpitations with abnormal event monitor January 2021, with preliminary demonstrating brief asymptomatic PSVT, no atrial fibrillation/flutter, AV block, or ventricular tachycardia; palpitations have ceased since decreasing caffeine consumption February  2021  3. Dizziness  4. Hypertension  5. Hyperlipidemia; on statin therapy  6. GERD/hiatal hernia  7. Rheumatoid arthritis  8. Essential tremor  9. Peripheral neuropathy  10. History of Bell's palsy 2017  11. [facility] 2-day hospitalization January 2021 for acute pancreatitis  12. [facility] ED, May 2021, for constipation on opioid medication s/p right hip surgery. She was given mineral oil enema in the ED and prescribed mag citrate and hydrocortisone cream.  13. Surgical history:  a. Hysterectomy  b. Cholecystectomy  c. Left foot surgery  d. Right hip replacement, May 2021    Allergies   Allergen Reactions   • Quinolones    • Sulfa Antibiotics Rash       Current Outpatient Medications:   •  acetaminophen (TYLENOL) 500 MG tablet, Administer ACETAMINOPHEN 500mg PO pre-infusion (REMICADE)., Disp: , Rfl:   •  amLODIPine (NORVASC) 5 MG tablet, Take 1 tablet by mouth Daily., Disp: 30 tablet, Rfl: 0  •  aspirin 81 MG chewable tablet, Chew 1 tablet., Disp: , Rfl:   •  cyclobenzaprine (FLEXERIL) 10 MG tablet, Take 10 mg by mouth every night at bedtime., Disp: , Rfl:   •  folic acid (FOLVITE) 1 MG tablet, Take 5 mg by mouth Daily., Disp: , Rfl:   •  inFLIXimab-dyyb (Inflectra) 100 MG injection, Administer INFLECTRA 600mg IV every 8 weeks, Disp: , Rfl:   •  loratadine (CLARITIN REDITABS) 10 MG disintegrating tablet, 10 mg., Disp: , Rfl:   •  methotrexate 2.5 MG tablet, Take 20 mg by mouth 1 (One) Time Per Week. Taken on Sundays, Disp: , Rfl:   •  Multiple Vitamins-Calcium (ONE-A-DAY WOMENS FORMULA PO), One-A-Day Womens Formula 18 mg iron-400 mcg-500 mg Ca tablet, Disp: , Rfl:   •  omeprazole (priLOSEC) 40 MG capsule, Take 20 mg by mouth Daily., Disp: , Rfl: 5  •  rosuvastatin (CRESTOR) 10 MG tablet, Take 10 mg by mouth Daily., Disp: , Rfl:   •  HYDROcodone-acetaminophen (NORCO)  MG per tablet, Take 1 tablet by mouth Every 6 (Six) Hours As Needed for Moderate Pain  (Hip arthroplasty)., Disp: , Rfl:   •  hydrocortisone 1 % cream, Apply  " topically to the appropriate area as directed 2 (Two) Times a Day., Disp: 100 g, Rfl: 0  •  Sod Picosulfate-Mag Ox-Cit Acd (Clenpiq) 10-3.5-12 MG-GM -GM/160ML solution, Clenpiq 10 mg-3.5 gram-12 gram/160 mL oral solution  AS DIRECTED, Disp: , Rfl:   •  Suprep Bowel Prep Kit 17.5-3.13-1.6 GM/177ML solution oral solution, , Disp: , Rfl:     History of Present Illness: Patient returns for scheduled 5-month follow up. Patient had a hip replacement in May 2021, performed by Dr. Aquino. She currently has an MRI scheduled for her knee.  She denies any cardiac issues as a result of her surgery. Patient otherwise has had no interim ER visits, hospitalizations, serious illnesses, or injuries. She also reports edema and erythema of the bilateral ankles and right knee for a period of three days after her surgery. She reports use of hydrocodone, tramadol, and tylenol for her knee pain after her surgery, but has since ceased taking medication for pain. Patient had an episode of near-syncope last time she was in the hospital, and another episode one week ago while at the pool, lasting only a few seconds. She also reports associated palpitations. She is active and symptomatic on a daily basis. Patient denies chest pain, shortness of breath, dizziness, and syncope. The patient is fully immunized against COVID-19.         ROS   Obtained and negative except as outlined in problem list and HPI.    Procedures       Objective:       Vitals:    07/21/21 0851 07/21/21 0856 07/21/21 0911   BP: 152/90 146/94 130/72   BP Location: Left arm Left arm Left arm   Patient Position: Sitting Standing Sitting   Pulse: 87 92    SpO2: 100%     Weight: 67.1 kg (148 lb)     Height: 162.6 cm (64\")       Body mass index is 25.4 kg/m².  Last weight: 149 lbs    Vitals reviewed.   Constitutional:       Appearance: Well-developed.   Neck:      Thyroid: No thyromegaly.      Vascular: No carotid bruit or JVD.      Lymphadenopathy: No cervical adenopathy. "   Pulmonary:      Effort: Pulmonary effort is normal.      Breath sounds: Normal breath sounds. No wheezing. No rhonchi. No rales.   Cardiovascular:      Regular rhythm.      Murmurs: There is a grade 1/6 mid frequency midsystolic murmur at the URSB.      No gallop. No S3 gallop.   Pulses:     Dorsalis pedis: 2+ bilaterally.     Posterior tibial: 2+ bilaterally.  Edema:     Peripheral edema absent.   Abdominal:      Palpations: Abdomen is soft. There is no abdominal mass.      Tenderness: There is no abdominal tenderness.   Musculoskeletal: Normal range of motion. Skin:     General: Skin is warm and dry.      Findings: No rash.   Neurological:      Mental Status: Alert and oriented to person, place, and time.           Lab Review:   Lab Results   Component Value Date    GLUCOSE 111 (H) 05/13/2021    BUN 23 05/13/2021    CREATININE 0.74 05/13/2021    EGFRIFNONA 79 05/13/2021    BCR 31.1 (H) 05/13/2021     05/13/2021    K 4.3 05/13/2021     05/13/2021    MG 2.2 01/14/2021    CO2 24.0 05/13/2021    CALCIUM 8.8 05/13/2021    ALBUMIN 3.40 (L) 05/13/2021    AST 48 (H) 05/13/2021    ALT 59 (H) 05/13/2021       Lab Results   Component Value Date    WBC 11.34 (H) 05/13/2021    HGB 12.8 05/13/2021    HCT 37.9 05/13/2021    MCV 93.1 05/13/2021     05/13/2021       Lab Results   Component Value Date    CHOL 128 01/13/2021     Lab Results   Component Value Date    TRIG 66 01/13/2021     Lab Results   Component Value Date    HDL 65 (H) 01/13/2021     Lab Results   Component Value Date    LDL 49 01/13/2021     Event monitor, 2/17/2021 (reviewed with patient by letter - limit caffeine, do not recommend formal electrophysiology consultation):  Predominantly sinus rhythm with hear rate ranging from /minute with average of 74/ minute. 583 isolated PACs and 12 atrial couplets. 30 runs of SVT which were asymptomatic. No atrial fibrillation/flutter, AV block, pauses, or ventricular tachycardia. 1 run of AVR for 3  beats at 71/minute.     CT Abdomen/Pelvis, 5/13/2021:  1. No clearly acute process in the abdomen or pelvis. Imaging findings most characteristic of constipation but no bowel obstruction. Normal appendix.  2. Hiatal hernia.  3. Surgical absence of the gallbladder.  4. Interval right hip revision procedure.        Assessment:       Overall continued acceptable course with no new interim cardiopulmonary complaints with good functional status. We will defer additional diagnostic or therapeutic intervention from a cardiac perspective at this time.     Diagnosis Plan   1. Near syncope  No documented recurrence. Continue current medication and hydration.   2. Palpitations  No sustained recurrence; refer additional studies.   3. Dizziness  No recurrence; continue current treatment.    4. Essential hypertension  Acceptable control. Continue current medication.   5. Dyslipidemia  Well controlled. Continue current medication.          Plan:         1. Patient to continue current medications and close follow up with the above providers.  2. Patient will initiate low-impact exercise as tolerated.   3. Tentative cardiology follow up in January 2022 or patient may return sooner PRN.  4. 1-800 card issued.     Scribed for Arash Caceres MD by Ori Ward. 7/21/2021  09:16 EDT    I, Arash Caceres MD, [facility], personally performed the services described in this documentation as scribed by the above named individual in my presence, and it is both accurate and complete. At 09:35 EDT on 07/21/2021

## 2021-07-21 ENCOUNTER — OFFICE VISIT (OUTPATIENT)
Dept: CARDIOLOGY | Facility: CLINIC | Age: 66
End: 2021-07-21

## 2021-07-21 VITALS
DIASTOLIC BLOOD PRESSURE: 72 MMHG | HEART RATE: 92 BPM | HEIGHT: 64 IN | WEIGHT: 148 LBS | BODY MASS INDEX: 25.27 KG/M2 | OXYGEN SATURATION: 100 % | SYSTOLIC BLOOD PRESSURE: 130 MMHG

## 2021-07-21 DIAGNOSIS — R55 NEAR SYNCOPE: Primary | ICD-10-CM

## 2021-07-21 DIAGNOSIS — I10 ESSENTIAL HYPERTENSION: ICD-10-CM

## 2021-07-21 DIAGNOSIS — R42 DIZZINESS: ICD-10-CM

## 2021-07-21 DIAGNOSIS — E78.5 DYSLIPIDEMIA: ICD-10-CM

## 2021-07-21 DIAGNOSIS — R00.2 PALPITATIONS: ICD-10-CM

## 2021-07-21 PROCEDURE — 99214 OFFICE O/P EST MOD 30 MIN: CPT | Performed by: INTERNAL MEDICINE

## 2021-07-21 RX ORDER — LORATADINE 10 MG/1
10 TABLET, ORALLY DISINTEGRATING ORAL DAILY
COMMUNITY
Start: 2021-07-20

## 2021-07-21 RX ORDER — INFLIXIMAB-DYYB 100 MG/10ML
INJECTION, POWDER, LYOPHILIZED, FOR SOLUTION INTRAVENOUS
COMMUNITY
Start: 2021-07-20

## 2021-07-21 RX ORDER — ACETAMINOPHEN 500 MG
TABLET ORAL
COMMUNITY
Start: 2021-07-20 | End: 2022-08-26

## 2021-07-21 RX ORDER — ASPIRIN 81 MG/1
1 TABLET, CHEWABLE ORAL
COMMUNITY
End: 2022-02-25

## 2022-02-25 ENCOUNTER — OFFICE VISIT (OUTPATIENT)
Dept: CARDIOLOGY | Facility: CLINIC | Age: 67
End: 2022-02-25

## 2022-02-25 VITALS
HEART RATE: 83 BPM | OXYGEN SATURATION: 98 % | HEIGHT: 65 IN | SYSTOLIC BLOOD PRESSURE: 125 MMHG | BODY MASS INDEX: 25.56 KG/M2 | DIASTOLIC BLOOD PRESSURE: 84 MMHG | WEIGHT: 153.4 LBS

## 2022-02-25 DIAGNOSIS — R55 NEAR SYNCOPE: Primary | ICD-10-CM

## 2022-02-25 DIAGNOSIS — R42 DIZZINESS: ICD-10-CM

## 2022-02-25 DIAGNOSIS — I10 ESSENTIAL HYPERTENSION: ICD-10-CM

## 2022-02-25 DIAGNOSIS — R00.2 PALPITATIONS: ICD-10-CM

## 2022-02-25 DIAGNOSIS — E78.5 DYSLIPIDEMIA: ICD-10-CM

## 2022-02-25 PROCEDURE — 99214 OFFICE O/P EST MOD 30 MIN: CPT | Performed by: INTERNAL MEDICINE

## 2022-02-25 NOTE — PROGRESS NOTES
Subjective:     Encounter Date:02/25/2022    Patient ID: Alaina Kennedy is a 66 y.o.  white female from [place], [place], retired as a  at [employer].    REFERRING PROVIDER: BJ Mosquera  PHYSICIAN: Ilda Suarez MD   NEUROLOGIST: BJ Steven  RHEUMATOLOGIST: Giuliano Mckoy MD  ORTHOPEDIC SURGEON: Reece Vaz MD    Chief Complaint:   Chief Complaint   Patient presents with   • Palpitations   • Syncope       Problem List:  1. Pre-syncope, likely vasovagal  a. Echocardiogram 9/13/2017: LVEF 60%, LV diastolic dysfunction grade 1 consistent with impaired relaxation, RVSP 10.6 mmHg  b. Regadenoson stress test 10/05/2017: Test is negative for anginal symptoms or diagnostic ST segment changes, LVEF greater than 70%, myocardial perfusion imaging indicates a normal myocardial perfusion study with no evidence of ischemia  c. [facility] ED January 2021 presyncope, hypertension in the setting of acute pancreatitis  d. MRA head/neck 01/06/2021: Chronic changes seen within the periventricular and subcortical white matter with no abnormal contrast-enhancement.  No acute intracranial abnormality.  Unremarkable MRA of the head and neck  e. MRI brain 01/06/2021:Chronic changes seen within the periventricular and subcortical white matter with no abnormal contrast-enhancement.  No acute intracranial abnormality.  Unremarkable MRA of the head and neck  f. Echocardiogram 02/14/2021: LVEF 68%, RVSP less than 35 mmHg, normal RV cavity size, wall thickness, systolic function and septal motion noted.  Aortic valve exhibits mild sclerosis  g. Residual class I symptoms with normal electrocardiogram with subsequent acceptable event monitor, February 2021, July 2021, February 2022  2. Palpitations with abnormal event monitor January 2021, with preliminary demonstrating brief asymptomatic PSVT, no atrial fibrillation/flutter, AV block, or ventricular tachycardia; palpitations have ceased since  decreasing caffeine consumption February 2021  3. Dizziness  4. Hypertension  5. Hyperlipidemia; on statin therapy  6. GERD/hiatal hernia  7. Rheumatoid arthritis  8. Essential tremor  9. Peripheral neuropathy  10. History of Bell's palsy 2017  11. [facility] with [facility] 2-day hospitalization January 2021 for acute pancreatitis  12. Merged with [facility] ED, May 2021, for constipation on opioid medication s/p right hip surgery. She was given mineral oil enema in the ED and prescribed mag citrate and hydrocortisone cream.  13. Surgical history:  a. Hysterectomy  b. Cholecystectomy  c. Left foot surgery  d. Right hip replacement, May 2021    Allergies   Allergen Reactions   • Quinolones    • Sulfa Antibiotics Rash       Current Outpatient Medications:   •  acetaminophen (TYLENOL) 500 MG tablet, Administer ACETAMINOPHEN 500mg PO pre-infusion (REMICADE)., Disp: , Rfl:   •  amLODIPine (NORVASC) 5 MG tablet, Take 1 tablet by mouth Daily., Disp: 30 tablet, Rfl: 0  •  cyclobenzaprine (FLEXERIL) 10 MG tablet, Take 10 mg by mouth every night at bedtime., Disp: , Rfl:   •  folic acid (FOLVITE) 1 MG tablet, Take 5 mg by mouth Daily., Disp: , Rfl:   •  hydrocortisone 1 % cream, Apply  topically to the appropriate area as directed 2 (Two) Times a Day., Disp: 100 g, Rfl: 0  •  inFLIXimab-dyyb (Inflectra) 100 MG injection, Administer INFLECTRA 600mg IV every 8 weeks, Disp: , Rfl:   •  loratadine (CLARITIN REDITABS) 10 MG disintegrating tablet, Place 10 mg on the tongue Daily., Disp: , Rfl:   •  methotrexate 2.5 MG tablet, Take 20 mg by mouth 1 (One) Time Per Week. Taken on Sundays, Disp: , Rfl:   •  Multiple Vitamins-Calcium (ONE-A-DAY WOMENS FORMULA PO), One-A-Day Womens Formula 18 mg iron-400 mcg-500 mg Ca tablet, Disp: , Rfl:   •  omeprazole (priLOSEC) 40 MG capsule, Take 20 mg by mouth Daily., Disp: , Rfl: 5  •  rosuvastatin (CRESTOR) 10 MG tablet, Take 10 mg by mouth Daily., Disp: , Rfl:     History of Present Illness: Patient returns for scheduled 7-month follow  "up. She has been feeling well overall from a cardiovascular standpoint. She reports she will occasionally experience brief episodes of palpitations but she does not have associated symptoms and there are no known triggers for this. Patient denies chest pain, shortness of breath, edema, dizziness, and syncope. She has continued to have an excellent result from her right hip surgery in May 2021. She has had no interim ER visits, hospitalizations, serious illnesses, or surgeries. She has received COVID immunization.      ROS   Obtained and negative except as outlined in problem list and HPI.    Procedures       Objective:       Vitals:    02/25/22 1337 02/25/22 1339   BP: 127/78 125/84   BP Location: Left arm Left arm   Patient Position: Sitting Standing   Pulse: 83 83   SpO2: 98%    Weight: 69.6 kg (153 lb 6.4 oz)    Height: 165.1 cm (65\")      Body mass index is 25.53 kg/m².  Last weight: 148 lbs    Vitals reviewed.   Constitutional:       Appearance: Well-developed.   Neck:      Thyroid: No thyromegaly.      Vascular: No carotid bruit or JVD.      Lymphadenopathy: No cervical adenopathy.   Pulmonary:      Effort: Pulmonary effort is normal.      Breath sounds: Normal breath sounds. No wheezing. No rhonchi. No rales.   Cardiovascular:      Regular rhythm.      Murmurs: There is a grade 1/6 mid frequency midsystolic murmur at the URSB.      No gallop. No S3 gallop.   Pulses:     Dorsalis pedis: 2+ bilaterally.     Posterior tibial: 2+ bilaterally.  Edema:     Peripheral edema absent.   Abdominal:      Palpations: Abdomen is soft. There is no abdominal mass.      Tenderness: There is no abdominal tenderness.   Musculoskeletal: Normal range of motion. Skin:     General: Skin is warm and dry.      Findings: No rash.   Neurological:      Mental Status: Alert and oriented to person, place, and time.           Lab Review:     No recent laboratory studies available for review today.    Lab Results   Component Value Date    " GLUCOSE 111 (H) 05/13/2021    BUN 23 05/13/2021    CREATININE 0.74 05/13/2021    EGFRIFNONA 79 05/13/2021    BCR 31.1 (H) 05/13/2021     05/13/2021    K 4.3 05/13/2021     05/13/2021    MG 2.2 01/14/2021    CO2 24.0 05/13/2021    CALCIUM 8.8 05/13/2021    ALBUMIN 3.40 (L) 05/13/2021    AST 48 (H) 05/13/2021    ALT 59 (H) 05/13/2021       Lab Results   Component Value Date    WBC 11.34 (H) 05/13/2021    HGB 12.8 05/13/2021    HCT 37.9 05/13/2021    MCV 93.1 05/13/2021     05/13/2021       Lab Results   Component Value Date    CHOL 128 01/13/2021     Lab Results   Component Value Date    TRIG 66 01/13/2021     Lab Results   Component Value Date    HDL 65 (H) 01/13/2021     Lab Results   Component Value Date    LDL 49 01/13/2021             Assessment:       Overall continued acceptable course with no new interim cardiopulmonary complaints with good functional status. We will defer additional diagnostic or therapeutic intervention from a cardiac perspective at this time. If she develops more frequent or more severe palpitations or associated symptoms we will have an event monitor mailed to her.      Diagnosis Plan   1. Near syncope  No recurrences.   2. Palpitations  Largely asymptomatic. Continue current treatment.   3. Dizziness  Asymptomatic.   4. Essential hypertension  Well controlled. Continue current treatment.   5. Dyslipidemia  Well controlled, January 2021. No new data to review. Continue rosuvastatin.          Plan:         1. Patient to continue current medications and close follow up with the above providers.  2. Tentative cardiology follow up in August 2022 or patient may return sooner PRN.    I, Nico Ball, attest that this documentation has been prepared under the direction and in the presence of Arash Caceres MD 02/25/2022    IArash MD, [facility], personally performed the services described in this documentation as scribed by the above named individual in my presence,  and it is both accurate and complete. At 13:53 EST on 02/25/2022

## 2022-03-22 ENCOUNTER — HOSPITAL ENCOUNTER (OUTPATIENT)
Dept: GENERAL RADIOLOGY | Facility: HOSPITAL | Age: 67
Discharge: HOME | End: 2022-03-22
Admitting: INTERNAL MEDICINE

## 2022-03-22 ENCOUNTER — TRANSCRIBE ORDERS (OUTPATIENT)
Dept: ADMINISTRATIVE | Facility: HOSPITAL | Age: 67
End: 2022-03-22

## 2022-03-22 DIAGNOSIS — M54.2 CERVICAL PAIN (NECK): ICD-10-CM

## 2022-03-22 DIAGNOSIS — M54.2 CERVICAL PAIN (NECK): Primary | ICD-10-CM

## 2022-03-22 PROCEDURE — 72052 X-RAY EXAM NECK SPINE 6/>VWS: CPT

## 2022-05-18 ENCOUNTER — TRANSCRIBE ORDERS (OUTPATIENT)
Dept: NUTRITION | Facility: HOSPITAL | Age: 67
End: 2022-05-18

## 2022-05-18 DIAGNOSIS — K58.1 IRRITABLE BOWEL SYNDROME WITH CONSTIPATION: Primary | ICD-10-CM

## 2022-07-07 ENCOUNTER — HOSPITAL ENCOUNTER (OUTPATIENT)
Dept: NUTRITION | Facility: HOSPITAL | Age: 67
Setting detail: RECURRING SERIES
Discharge: HOME | End: 2022-07-07

## 2022-07-07 VITALS — WEIGHT: 145 LBS | BODY MASS INDEX: 24.16 KG/M2 | HEIGHT: 65 IN

## 2022-07-07 PROCEDURE — 97802 MEDICAL NUTRITION INDIV IN: CPT

## 2022-07-07 NOTE — CONSULTS
"Adult Outpatient Nutrition  Assessment/PES    Patient Name: Alaina Kennedy  YOB: 1955  MRN: [id number]      Assessment Date: 07/07/22      This medical referred consult was provided via telehealth as patient was unable to attend an in-office appointment today due to the COVID-19 crisis. Consent for treatment was given verbally. RD spent a total of 60 minutes with patient today.      Reason for visit:     Patient is a 67 y.o. female who is referred today for nutrition counseling r/t IBS with constipation and urgent bowel movements. Discussed basic principles of low-FODMAP diet and the potential relationship between food and symptoms. Discussed process of elimination phase (to remove all potentially problematic foods) and challenge phase (to identify individual's trigger foods) in order to improve quality of life, nutritional value of meals, and decrease negative symptoms. Reviewed list of low-FODMAP foods that are approved for elimination phase. Demonstrated how to use resources to plan balanced meals. Patient was encouraged to follow elimination phase for 4-6 weeks with resolution of symptoms before beginning challenge phase. Patient will be scheduled for a follow-up appointment before beginning challenge phase.                          Session Details:     Attendees: patient  Language/communication details: english  Barriers to learning: none  Details of home: lives independently    Anthropometrics:     Ht Readings from Last 1 Encounters:   07/07/22 165.1 cm (65\")      Wt Readings from Last 1 Encounters:   07/07/22 65.8 kg (145 lb)      BMI Readings from Last 1 Encounters:   07/07/22 24.13 kg/m²      Ideal body weight: 57 kg (125 lb 10.6 oz)  Adjusted ideal body weight: 60.5 kg (133 lb 6.4 oz)              Recent weight change: none    Medical History:    Hypertension  Hyperlipidemia  IBS    Pertinent Labs:     Lab Results   Component Value Date    CHOL 128 01/13/2021     Lab Results   Component " Value Date    TRIG 66 01/13/2021     Lab Results   Component Value Date    HDL 65 (H) 01/13/2021     Lab Results   Component Value Date    LDL 49 01/13/2021     Lab Results   Component Value Date    VLDL 14 01/13/2021     Lab Results   Component Value Date    LDLHDL 0.77 01/13/2021         Pertinent Medications/Supplements:      Current Outpatient Medications:   •  acetaminophen (TYLENOL) 500 MG tablet, Administer ACETAMINOPHEN 500mg PO pre-infusion (REMICADE)., Disp: , Rfl:   •  amLODIPine (NORVASC) 5 MG tablet, Take 1 tablet by mouth Daily., Disp: 30 tablet, Rfl: 0  •  cyclobenzaprine (FLEXERIL) 10 MG tablet, Take 10 mg by mouth every night at bedtime., Disp: , Rfl:   •  folic acid (FOLVITE) 1 MG tablet, Take 5 mg by mouth Daily., Disp: , Rfl:   •  hydrocortisone 1 % cream, Apply  topically to the appropriate area as directed 2 (Two) Times a Day., Disp: 100 g, Rfl: 0  •  inFLIXimab-dyyb (Inflectra) 100 MG injection, Administer INFLECTRA 600mg IV every 8 weeks, Disp: , Rfl:   •  loratadine (CLARITIN REDITABS) 10 MG disintegrating tablet, Place 10 mg on the tongue Daily., Disp: , Rfl:   •  methotrexate 2.5 MG tablet, Take 20 mg by mouth 1 (One) Time Per Week. Taken on Sundays, Disp: , Rfl:   •  Multiple Vitamins-Calcium (ONE-A-DAY WOMENS FORMULA PO), One-A-Day Womens Formula 18 mg iron-400 mcg-500 mg Ca tablet, Disp: , Rfl:   •  omeprazole (priLOSEC) 40 MG capsule, Take 20 mg by mouth Daily., Disp: , Rfl: 5  •  rosuvastatin (CRESTOR) 10 MG tablet, Take 10 mg by mouth Daily., Disp: , Rfl:     Patient takes Zenpep     Nutrition Assessment:     Potential foods that cause negative symptoms: Dairy, most fruits and most vegetables  Food insecurity: none  Difficulty chewing: none  Difficulty swallowing: none  History of eating disorder/disordered eating habits: none    See media tab for assessment form with complete dietary recall    Nutrition assessment comments: Patient consumes 3 meals daily, meals are high in protein  and low in fruits/vegetables.       PES Statement:     Food causing negative symptoms related to IBS as evidenced by report of constipation, gas/bloating, and urgent bowel movements when consuming high-fodmap foods.    Intervention Goal(s):     Follow a low FODMAP diet for 3-4 weeks before progressing to challenge phase.     Practice meal planning to ensure balanced meals and adequate intake.     Practice label reading to avoid high FODMAP ingredients and foods.     Call RD with any questions.       Resources Provided:     Low-FODMAP Tool Kit        Total of 60 minutes spent with patient on nutrition counseling. Education based on Academy of [employer] Dietetics guidelines. Patient was provided with RD's contact information. Follow up visit is scheduled for 8/4/2022.  Thank you for this referral.      Electronically signed by:  Brittaney Shore RD  07/07/22  12:11 EDT

## 2022-08-04 ENCOUNTER — HOSPITAL ENCOUNTER (OUTPATIENT)
Dept: NUTRITION | Facility: HOSPITAL | Age: 67
Setting detail: RECURRING SERIES
Discharge: HOME | End: 2022-08-04

## 2022-08-04 NOTE — PROGRESS NOTES
Adult Outpatient Nutrition  Assessment    Patient Name:  Alaina Kennedy  YOB: 1955  MRN: [id number]    Assessment Date:  8/4/2022    Telephone nutrition consult, 30 minutes. This medical referred consult was provided as a telephone call, tele-health or e-visit, as patient is unable to attend an in-office appointment due to the COVID-19 crisis. Consent for treatment was given verbally.      Comments:  Patient seen today for follow-up appointment for nutrition counseling r/t IBS and following a low-fodmap diet. Patient reports significant improvement to symptoms while following low-fodmap diet. Patient states they are no longer experiencing loose bowel movements or bloating while following diet. Patient describes a few incidents where they consumed a food that was identified as high-fodmap and they experienced negative symptoms. RD reviewed protocol for reintroduction. RD encouraged a more gradual approach for challenging fructose due to suspected intolerance and a moderate approach for all other fodmap categories. Patient demonstrates understanding by correctly using resource to identify appropriate foods for each reintroduction category. Patient was instructed to contact RD with any questions or concerns. Follow-up scheduled for 9/6.          Electronically signed by:  Brittaney Shore RD  08/04/22 14:44 EDT

## 2022-08-26 ENCOUNTER — OFFICE VISIT (OUTPATIENT)
Dept: CARDIOLOGY | Facility: CLINIC | Age: 67
End: 2022-08-26

## 2022-08-26 VITALS
HEART RATE: 80 BPM | DIASTOLIC BLOOD PRESSURE: 83 MMHG | OXYGEN SATURATION: 98 % | HEIGHT: 65 IN | BODY MASS INDEX: 24.72 KG/M2 | SYSTOLIC BLOOD PRESSURE: 126 MMHG | WEIGHT: 148.4 LBS

## 2022-08-26 DIAGNOSIS — R42 DIZZINESS: ICD-10-CM

## 2022-08-26 DIAGNOSIS — E78.5 DYSLIPIDEMIA: ICD-10-CM

## 2022-08-26 DIAGNOSIS — I10 ESSENTIAL HYPERTENSION: ICD-10-CM

## 2022-08-26 DIAGNOSIS — R00.2 PALPITATIONS: ICD-10-CM

## 2022-08-26 DIAGNOSIS — R55 NEAR SYNCOPE: Primary | ICD-10-CM

## 2022-08-26 PROCEDURE — 99214 OFFICE O/P EST MOD 30 MIN: CPT | Performed by: INTERNAL MEDICINE

## 2022-08-26 NOTE — PROGRESS NOTES
Subjective:     Encounter Date:08/26/2022    Patient ID: Alaina Kennedy is a 67 y.o.  white female from [place], [place], retired as a  at [employer].    REFERRING PROVIDER: BJ Mosquera  PHYSICIAN: Ilda Suarez MD   NEUROLOGIST: BJ Steven  RHEUMATOLOGIST: Giuliano Mckoy MD  ORTHOPEDIC SURGEON: Reece Vaz MD      Chief Complaint:   Chief Complaint   Patient presents with   • Syncope       Problem List:  1. Pre-syncope, likely vasovagal in the setting of pancreatitis  a. Echocardiogram 9/13/2017: LVEF 60%, LV diastolic dysfunction grade 1 consistent with impaired relaxation, RVSP 10.6 mmHg  b. Regadenoson stress test 10/05/2017: Test is negative for anginal symptoms or diagnostic ST segment changes, LVEF greater than 70%, myocardial perfusion imaging indicates a normal myocardial perfusion study with no evidence of ischemia  c. [facility] ED January 2021 presyncope, hypertension in the setting of acute pancreatitis  d. MRA head/neck 01/06/2021: Chronic changes seen within the periventricular and subcortical white matter with no abnormal contrast-enhancement.  No acute intracranial abnormality.  Unremarkable MRA of the head and neck  e. MRI brain 01/06/2021:Chronic changes seen within the periventricular and subcortical white matter with no abnormal contrast-enhancement.  No acute intracranial abnormality.  Unremarkable MRA of the head and neck  f. Echocardiogram 02/14/2021: LVEF 68%, RVSP less than 35 mmHg, normal RV cavity size, wall thickness, systolic function and septal motion noted.  Aortic valve exhibits mild sclerosis  g. Residual class I symptoms with normal electrocardiogram with subsequent acceptable event monitor, February 2021, July 2021, February 2022, August 2022  2. Palpitations with abnormal event monitor January 2021, with preliminary demonstrating brief asymptomatic PSVT, no atrial fibrillation/flutter, AV block, or ventricular tachycardia;  palpitations have ceased since decreasing caffeine consumption February 2021  3. Dizziness  4. Hypertension  5. Hyperlipidemia; on statin therapy  6. GERD/hiatal hernia  7. Rheumatoid arthritis  8. Essential tremor  9. Peripheral neuropathy  10. History of Bell's palsy 2017  11. [facility] 2-day hospitalization January 2021 for acute pancreatitis  12. [facility] ED, May 2021, for constipation on opioid medication s/p right hip surgery. She was given mineral oil enema in the ED and prescribed mag citrate and hydrocortisone cream.  13. Surgical history:  a. Hysterectomy  b. Cholecystectomy  c. Left foot surgery  d. Right hip replacement, May 2021  e. Upcoming right knee replacement October 2022    Allergies   Allergen Reactions   • Losartan GI Intolerance   • Oxycodone Itching   • Quinolones    • Silver Nitrate Rash   • Sulfa Antibiotics Rash       Current Outpatient Medications:   •  amLODIPine (NORVASC) 5 MG tablet, Take 1 tablet by mouth Daily., Disp: 30 tablet, Rfl: 0  •  cyclobenzaprine (FLEXERIL) 10 MG tablet, Take 10 mg by mouth every night at bedtime., Disp: , Rfl:   •  folic acid (FOLVITE) 1 MG tablet, Take 5 mg by mouth Daily., Disp: , Rfl:   •  inFLIXimab-dyyb (Inflectra) 100 MG injection, Administer INFLECTRA 600mg IV every 8 weeks, Disp: , Rfl:   •  loratadine (CLARITIN REDITABS) 10 MG disintegrating tablet, Place 10 mg on the tongue Daily., Disp: , Rfl:   •  methotrexate 2.5 MG tablet, Take 20 mg by mouth 1 (One) Time Per Week. Taken on Sundays, Disp: , Rfl:   •  Multiple Vitamins-Calcium (ONE-A-DAY WOMENS FORMULA PO), One-A-Day Womens Formula 18 mg iron-400 mcg-500 mg Ca tablet, Disp: , Rfl:   •  omeprazole (priLOSEC) 40 MG capsule, Take 20 mg by mouth Daily., Disp: , Rfl: 5  •  rosuvastatin (CRESTOR) 10 MG tablet, Take 10 mg by mouth Daily., Disp: , Rfl:     History of Present Illness: Patient returns for scheduled 6-month follow up.  She denies any chest pain, shortness of breath, palpitations, dizziness,  "presyncope, or syncope. She has upcoming right knee replacement surgery with Dr. Vaz October 2022.  She is scheduled to see her physician next month for routine laboratory testing.  Since she has retired, she says that she is much more active and has lost some weight.  She has had some leg cramping and is hoping that she can decrease her rosuvastatin dosage. She has had no additional interim ER visits, hospitalizations, serious illnesses, or surgeries.         ROS   Obtained and negative except as outlined in problem list and HPI.    Procedures       Objective:       Vitals:    08/26/22 1430 08/26/22 1432   BP: 136/84 126/83   BP Location: Left arm Left arm   Patient Position: Sitting Standing   Pulse: 78 80   SpO2: 98%    Weight: 67.3 kg (148 lb 6.4 oz)    Height: 165.1 cm (65\")      Body mass index is 24.7 kg/m².  Last weight: 153 lbs    Constitutional:       Appearance: Healthy appearance. Not in distress.   Neck:      Vascular: No JVR. JVD normal.   Pulmonary:      Effort: Pulmonary effort is normal.      Breath sounds: Normal breath sounds. No wheezing. No rhonchi. No rales.   Chest:      Chest wall: Not tender to palpatation.   Cardiovascular:      PMI at left midclavicular line. Normal rate. Regular rhythm. Normal S1. Normal S2.      Murmurs: There is a grade 1/6 systolic murmur at the URSB.      No gallop. No click. No rub.   Pulses:     Dorsalis pedis: 1+ bilaterally.     Posterior tibial: 1+ bilaterally.  Edema:     Peripheral edema absent.   Abdominal:      General: Bowel sounds are normal.      Palpations: Abdomen is soft.      Tenderness: There is no abdominal tenderness.   Musculoskeletal: Normal range of motion.         General: No tenderness. Skin:     General: Skin is warm and dry.   Neurological:      General: No focal deficit present.      Mental Status: Alert and oriented to person, place and time.       Lab Review:     No recent laboratory studies available for review today.    Lab " Results   Component Value Date    GLUCOSE 111 (H) 05/13/2021    BUN 23 05/13/2021    CREATININE 0.74 05/13/2021    EGFRIFNONA 79 05/13/2021    BCR 31.1 (H) 05/13/2021     05/13/2021    K 4.3 05/13/2021     05/13/2021    MG 2.2 01/14/2021    CO2 24.0 05/13/2021    CALCIUM 8.8 05/13/2021    ALBUMIN 3.40 (L) 05/13/2021    AST 48 (H) 05/13/2021    ALT 59 (H) 05/13/2021       Lab Results   Component Value Date    WBC 11.34 (H) 05/13/2021    HGB 12.8 05/13/2021    HCT 37.9 05/13/2021    MCV 93.1 05/13/2021     05/13/2021       Lab Results   Component Value Date    CHOL 128 01/13/2021     Lab Results   Component Value Date    TRIG 66 01/13/2021     Lab Results   Component Value Date    HDL 65 (H) 01/13/2021     Lab Results   Component Value Date    LDL 49 01/13/2021     · XR cervical spine complete with flex ext, 22 March 2022  FINDINGS:  Prevertebral soft tissues normal. There has been posterior band fusion  of C1 and C2. There is prominent degenerative disc disease at C5-C6 and  C6-C7, and mild degenerative disc disease at C3-C4, C4-C5, and C7-T1.  Diffuse facet osteoarthritis. Slight degenerative anterolisthesis of C4  and C7. Bony foraminal stenosis at C4-C5 through C6-C7 on the right.  Mild bony foraminal stenosis at C5-C6 and C6-C7 on the left. No abnormal  intervertebral motion between flexion and extension      IMPRESSION:  Cervical degenerative disease with no evidence of instability.        Assessment:       Overall continued acceptable course with no new interim cardiopulmonary complaints with acceptable functional status. We will defer additional diagnostic or therapeutic intervention from a cardiac perspective at this time.     Diagnosis Plan   1. Near syncope  No recurrent presyncope/syncopal episodes   2. Palpitations  No persistent palpitations   3. Dizziness  No recurrent dizziness   4. Essential hypertension  Controlled, continue current cardiac medications   5. Dyslipidemia  No new  labs to review, continue rosuvastatin          Plan:         1. Patient to continue current medications and close follow up with the above providers.  2. Tentative cardiology follow up in May 2023 or patient may return sooner PRN.    Scribed for Arash Caceres MD by Pilar Harrington, APRN. 8/26/2022  14:55 EDT    I, Arash Caceres MD, [facility], personally performed the services described in this documentation as scribed by the above named individual in my presence, and it is both accurate and complete. At 15:59 EDT on 08/26/2022

## 2022-09-06 ENCOUNTER — APPOINTMENT (OUTPATIENT)
Dept: NUTRITION | Facility: HOSPITAL | Age: 67
End: 2022-09-06

## 2023-03-01 ENCOUNTER — APPOINTMENT (OUTPATIENT)
Dept: MRI IMAGING | Facility: HOSPITAL | Age: 68
End: 2023-03-01
Payer: MEDICARE

## 2023-03-01 ENCOUNTER — HOSPITAL ENCOUNTER (EMERGENCY)
Facility: HOSPITAL | Age: 68
Discharge: HOME | End: 2023-03-01
Attending: EMERGENCY MEDICINE | Admitting: EMERGENCY MEDICINE
Payer: MEDICARE

## 2023-03-01 ENCOUNTER — APPOINTMENT (OUTPATIENT)
Dept: GENERAL RADIOLOGY | Facility: HOSPITAL | Age: 68
End: 2023-03-01
Payer: MEDICARE

## 2023-03-01 VITALS
HEIGHT: 64 IN | RESPIRATION RATE: 18 BRPM | OXYGEN SATURATION: 96 % | TEMPERATURE: 98.3 F | SYSTOLIC BLOOD PRESSURE: 157 MMHG | WEIGHT: 142 LBS | DIASTOLIC BLOOD PRESSURE: 81 MMHG | BODY MASS INDEX: 24.24 KG/M2 | HEART RATE: 73 BPM

## 2023-03-01 DIAGNOSIS — R42 ACUTE ONSET OF SEVERE VERTIGO: Primary | ICD-10-CM

## 2023-03-01 DIAGNOSIS — H53.9 VISUAL CHANGES: ICD-10-CM

## 2023-03-01 LAB
ALBUMIN SERPL-MCNC: 4.2 G/DL (ref 3.5–5.2)
ALBUMIN/GLOB SERPL: 1.4 G/DL
ALP SERPL-CCNC: 91 U/L (ref 39–117)
ALT SERPL W P-5'-P-CCNC: 24 U/L (ref 1–33)
ANION GAP SERPL CALCULATED.3IONS-SCNC: 10 MMOL/L (ref 5–15)
AST SERPL-CCNC: 21 U/L (ref 1–32)
BACTERIA UR QL AUTO: ABNORMAL /HPF
BASOPHILS # BLD AUTO: 0.03 10*3/MM3 (ref 0–0.2)
BASOPHILS NFR BLD AUTO: 0.3 % (ref 0–1.5)
BILIRUB SERPL-MCNC: 0.3 MG/DL (ref 0–1.2)
BILIRUB UR QL STRIP: NEGATIVE
BUN SERPL-MCNC: 20 MG/DL (ref 8–23)
BUN/CREAT SERPL: 29.9 (ref 7–25)
CALCIUM SPEC-SCNC: 9.1 MG/DL (ref 8.6–10.5)
CHLORIDE SERPL-SCNC: 100 MMOL/L (ref 98–107)
CLARITY UR: CLEAR
CO2 SERPL-SCNC: 26 MMOL/L (ref 22–29)
COLOR UR: YELLOW
CREAT SERPL-MCNC: 0.67 MG/DL (ref 0.57–1)
DEPRECATED RDW RBC AUTO: 50.4 FL (ref 37–54)
EGFRCR SERPLBLD CKD-EPI 2021: 95.9 ML/MIN/1.73
EOSINOPHIL # BLD AUTO: 0.04 10*3/MM3 (ref 0–0.4)
EOSINOPHIL NFR BLD AUTO: 0.4 % (ref 0.3–6.2)
ERYTHROCYTE [DISTWIDTH] IN BLOOD BY AUTOMATED COUNT: 15.3 % (ref 12.3–15.4)
GLOBULIN UR ELPH-MCNC: 3 GM/DL
GLUCOSE SERPL-MCNC: 104 MG/DL (ref 65–99)
GLUCOSE UR STRIP-MCNC: NEGATIVE MG/DL
HCT VFR BLD AUTO: 41.4 % (ref 34–46.6)
HGB BLD-MCNC: 13.7 G/DL (ref 12–15.9)
HGB UR QL STRIP.AUTO: NEGATIVE
HOLD SPECIMEN: NORMAL
HYALINE CASTS UR QL AUTO: ABNORMAL /LPF
IMM GRANULOCYTES # BLD AUTO: 0.03 10*3/MM3 (ref 0–0.05)
IMM GRANULOCYTES NFR BLD AUTO: 0.3 % (ref 0–0.5)
KETONES UR QL STRIP: NEGATIVE
LEUKOCYTE ESTERASE UR QL STRIP.AUTO: ABNORMAL
LYMPHOCYTES # BLD AUTO: 2.52 10*3/MM3 (ref 0.7–3.1)
LYMPHOCYTES NFR BLD AUTO: 26.1 % (ref 19.6–45.3)
MAGNESIUM SERPL-MCNC: 2.2 MG/DL (ref 1.6–2.4)
MCH RBC QN AUTO: 30.3 PG (ref 26.6–33)
MCHC RBC AUTO-ENTMCNC: 33.1 G/DL (ref 31.5–35.7)
MCV RBC AUTO: 91.6 FL (ref 79–97)
MONOCYTES # BLD AUTO: 0.61 10*3/MM3 (ref 0.1–0.9)
MONOCYTES NFR BLD AUTO: 6.3 % (ref 5–12)
NEUTROPHILS NFR BLD AUTO: 6.42 10*3/MM3 (ref 1.7–7)
NEUTROPHILS NFR BLD AUTO: 66.6 % (ref 42.7–76)
NITRITE UR QL STRIP: NEGATIVE
NRBC BLD AUTO-RTO: 0 /100 WBC (ref 0–0.2)
PH UR STRIP.AUTO: 7 [PH] (ref 5–8)
PLATELET # BLD AUTO: 305 10*3/MM3 (ref 140–450)
PMV BLD AUTO: 9.1 FL (ref 6–12)
POTASSIUM SERPL-SCNC: 4.1 MMOL/L (ref 3.5–5.2)
PROT SERPL-MCNC: 7.2 G/DL (ref 6–8.5)
PROT UR QL STRIP: NEGATIVE
RBC # BLD AUTO: 4.52 10*6/MM3 (ref 3.77–5.28)
RBC # UR STRIP: ABNORMAL /HPF
REF LAB TEST METHOD: ABNORMAL
SODIUM SERPL-SCNC: 136 MMOL/L (ref 136–145)
SP GR UR STRIP: 1.01 (ref 1–1.03)
SQUAMOUS #/AREA URNS HPF: ABNORMAL /HPF
TROPONIN T SERPL HS-MCNC: <6 NG/L
UROBILINOGEN UR QL STRIP: ABNORMAL
WBC # UR STRIP: ABNORMAL /HPF
WBC NRBC COR # BLD: 9.65 10*3/MM3 (ref 3.4–10.8)
WHOLE BLOOD HOLD COAG: NORMAL
WHOLE BLOOD HOLD SPECIMEN: NORMAL

## 2023-03-01 PROCEDURE — 70544 MR ANGIOGRAPHY HEAD W/O DYE: CPT

## 2023-03-01 PROCEDURE — 71045 X-RAY EXAM CHEST 1 VIEW: CPT

## 2023-03-01 PROCEDURE — 93005 ELECTROCARDIOGRAM TRACING: CPT

## 2023-03-01 PROCEDURE — 85025 COMPLETE CBC W/AUTO DIFF WBC: CPT | Performed by: EMERGENCY MEDICINE

## 2023-03-01 PROCEDURE — 81001 URINALYSIS AUTO W/SCOPE: CPT | Performed by: EMERGENCY MEDICINE

## 2023-03-01 PROCEDURE — 83735 ASSAY OF MAGNESIUM: CPT | Performed by: EMERGENCY MEDICINE

## 2023-03-01 PROCEDURE — 70549 MR ANGIOGRAPH NECK W/O&W/DYE: CPT

## 2023-03-01 PROCEDURE — 99283 EMERGENCY DEPT VISIT LOW MDM: CPT

## 2023-03-01 PROCEDURE — 70551 MRI BRAIN STEM W/O DYE: CPT

## 2023-03-01 PROCEDURE — 93005 ELECTROCARDIOGRAM TRACING: CPT | Performed by: EMERGENCY MEDICINE

## 2023-03-01 PROCEDURE — 80053 COMPREHEN METABOLIC PANEL: CPT | Performed by: EMERGENCY MEDICINE

## 2023-03-01 PROCEDURE — 84484 ASSAY OF TROPONIN QUANT: CPT | Performed by: EMERGENCY MEDICINE

## 2023-03-01 PROCEDURE — 0 GADOBENATE DIMEGLUMINE 529 MG/ML SOLUTION: Performed by: EMERGENCY MEDICINE

## 2023-03-01 PROCEDURE — A9577 INJ MULTIHANCE: HCPCS | Performed by: EMERGENCY MEDICINE

## 2023-03-01 PROCEDURE — 36415 COLL VENOUS BLD VENIPUNCTURE: CPT

## 2023-03-01 RX ORDER — MECLIZINE HYDROCHLORIDE 25 MG/1
25 TABLET ORAL EVERY 6 HOURS PRN
Qty: 20 TABLET | Refills: 0 | Status: SHIPPED | OUTPATIENT
Start: 2023-03-01

## 2023-03-01 RX ORDER — SODIUM CHLORIDE 0.9 % (FLUSH) 0.9 %
10 SYRINGE (ML) INJECTION AS NEEDED
Status: DISCONTINUED | OUTPATIENT
Start: 2023-03-01 | End: 2023-03-02 | Stop reason: HOSPADM

## 2023-03-01 RX ORDER — MECLIZINE HYDROCHLORIDE 25 MG/1
25 TABLET ORAL ONCE
Status: COMPLETED | OUTPATIENT
Start: 2023-03-01 | End: 2023-03-01

## 2023-03-01 RX ADMIN — MECLIZINE HYDROCHLORIDE 25 MG: 25 TABLET ORAL at 20:09

## 2023-03-01 RX ADMIN — GADOBENATE DIMEGLUMINE 13 ML: 529 INJECTION, SOLUTION INTRAVENOUS at 21:44

## 2023-03-01 NOTE — ED PROVIDER NOTES
Subjective   History of Present Illness  Laury Kennedy presents with an episode of severe vertigo.  She tells me he was she was sitting on the couch and had sudden severe vertigo.  She noted that it would be worse whenever she would open her eyes.  She denies focal numbness or weakness, difficulty speaking.  Symptoms lasted 30 seconds and have resolved.  While waiting to be seen she notes a new floater in her left visual field.        Review of Systems    Past Medical History:   Diagnosis Date   • Arthritis    • Bell's palsy    • Hyperlipidemia    • Hypertension    • Neuropathy    • Rheumatoid arthritis (HCC)        Allergies   Allergen Reactions   • Losartan GI Intolerance   • Oxycodone Itching   • Quinolones    • Silver Nitrate Rash   • Sulfa Antibiotics Rash       Past Surgical History:   Procedure Laterality Date   • BACK SURGERY     • CHOLECYSTECTOMY     • FOOT SURGERY     • HYSTERECTOMY     • KNEE SURGERY     • TOTAL HIP ARTHROPLASTY         Family History   Problem Relation Age of Onset   • Lung cancer Father    • Cancer Maternal Grandmother    • Stroke Maternal Grandfather    • Kidney disease Mother    • Lung disease Mother    • Arrhythmia Mother    • No Known Problems Sister    • Rheum arthritis Paternal Grandmother    • No Known Problems Sister    • No Known Problems Half-Sister    • No Known Problems Daughter    • No Known Problems Son        Social History     Socioeconomic History   • Marital status:    Tobacco Use   • Smoking status: Never   • Smokeless tobacco: Never   Vaping Use   • Vaping Use: Never used   Substance and Sexual Activity   • Alcohol use: No   • Drug use: No   • Sexual activity: Defer           Objective   Physical Exam  Vitals and nursing note reviewed.   Constitutional:       General: She is not in acute distress.     Appearance: Normal appearance.   HENT:      Head: Normocephalic and atraumatic.      Ears:      Comments: She has some clear fluid behind the left eardrum, right  eardrum is normal     Nose: Nose normal. No congestion or rhinorrhea.   Eyes:      General: No scleral icterus.     Conjunctiva/sclera: Conjunctivae normal.      Comments: There are no visual field deficits noted   Neck:      Comments: No JVD   Cardiovascular:      Rate and Rhythm: Normal rate and regular rhythm.      Heart sounds: No murmur heard.    No friction rub.   Pulmonary:      Effort: Pulmonary effort is normal.      Breath sounds: Normal breath sounds. No wheezing or rales.   Abdominal:      General: Bowel sounds are normal.      Palpations: Abdomen is soft.      Tenderness: There is no abdominal tenderness. There is no guarding or rebound.   Musculoskeletal:         General: No tenderness.      Cervical back: Normal range of motion and neck supple.      Right lower leg: No edema.      Left lower leg: No edema.   Skin:     General: Skin is warm and dry.      Coloration: Skin is not pale.      Findings: No erythema.   Neurological:      General: No focal deficit present.      Mental Status: She is alert and oriented to person, place, and time.      Motor: No weakness.      Coordination: Coordination normal.      Comments: Normal gait   Psychiatric:         Mood and Affect: Mood normal.         Behavior: Behavior normal.         Thought Content: Thought content normal.         Procedures           ED Course  ED Course as of 03/02/23 0038   Wed Mar 01, 2023   1841 Have reviewed her records.  She has had 2 prior negative angiograms of her head and neck over the last few years.  Symptoms have almost completely resolved.  I do not think angiograms need to be repeated although this could certainly be posterior stroke.  We will obtain MRI [DT]   2013 I discussed with Dr. Pickering who is on-call for neurology.  He asked that we obtain angiograms.  He felt if MRI was normal and angiograms were normal she could be discharged. [DT]   2054 I spoke with them about MRI findings.  She now endorses having some nasal  congestion over the last few weeks.  I do not know exactly why she developed a new floater, will have her follow-up with ophthalmology for that [DT]      ED Course User Index  [DT] Aly Serrano MD                                           Medical Decision Making  Please refer to emergency department course.  Initial differential diagnosis would include stroke, aneurysm, bleeding on the brain, and numerous other dangerous etiologies.  These were all ultimately ruled out with MRI.  Consultation was had with neurology.  Ultimately was treated symptomatically and discharged.    Acute onset of severe vertigo: acute illness or injury that poses a threat to life or bodily functions  Visual changes: acute illness or injury that poses a threat to life or bodily functions  Amount and/or Complexity of Data Reviewed  External Data Reviewed: notes.  Labs: ordered. Decision-making details documented in ED Course.  Radiology: ordered. Decision-making details documented in ED Course.  ECG/medicine tests: ordered.      Risk  Prescription drug management.          Final diagnoses:   Acute onset of severe vertigo   Visual changes       ED Disposition  ED Disposition     ED Disposition   Discharge    Condition   Stable    Comment   --             Sera Macedo, APRN  [address] [facility]  [address]  [place] [address]  [phone]    Follow up in 1 month(s)  ER follow up for acute vertigo    Ilda Suarez MD  [address]  [address]  [facility] [address]  [phone]               Medication List      New Prescriptions    meclizine 25 MG tablet  Commonly known as: ANTIVERT  Take 1 tablet by mouth Every 6 (Six) Hours As Needed for Dizziness.           Where to Get Your Medications      These medications were sent to [facility] [id number] - [facility] [address] [facility] - [phone]  - [phone]   [address] [facility] [address]    Phone: [phone]    · meclizine 25 MG tablet          Aly Serrano MD  03/02/23 0038

## 2023-03-02 NOTE — DISCHARGE INSTRUCTIONS
I would like you to get a formal eye exam.  If you have an eye doctor then call them, if you do not call [facility] eye clinic.  It is also important to follow-up with your primary care provider.  I would recommend you use a nasal decongestant such as Afrin for the next 2 days.  Take this with Mucinex and lots of fluids.  Return anytime if worsening or any other concerns.  I have sent in a prescription for Antivert which is a medicine you may take if you have further vertigo.

## 2023-03-03 LAB
QT INTERVAL: 380 MS
QTC INTERVAL: 443 MS

## 2023-05-16 NOTE — PROGRESS NOTES
Subjective:     Encounter Date:05/26/2023      Patient ID: Alaina Kennedy is a 68 y.o.   white female from [place], [place], retired as a  at [employer].     REFERRING PROVIDER: BJ Mosquera  PHYSICIAN: Ilda Suarez MD   NEUROLOGIST: BJ Steven  RHEUMATOLOGIST: Giuliano Mckoy MD  ORTHOPEDIC SURGEON: Reece Vaz MD.    Chief Complaint:   Chief Complaint   Patient presents with   • Palpitations   • Hypertension   • Hyperlipidemia     Problem List:  1.  Pre-syncope, likely vasovagal in the setting of pancreatitis  a. Echocardiogram 9/13/2017: LVEF 60%, LV diastolic dysfunction grade 1 consistent with impaired relaxation, RVSP 10.6 mmHg  b. Regadenoson stress test 10/05/2017: Test is negative for anginal symptoms or diagnostic ST segment changes, LVEF greater than 70%, myocardial perfusion imaging indicates a normal myocardial perfusion study with no evidence of ischemia  c. [facility] ED January 2021 presyncope, hypertension in the setting of acute pancreatitis  d. MRA head/neck 01/06/2021: Chronic changes seen within the periventricular and subcortical white matter with no abnormal contrast-enhancement.  No acute intracranial abnormality.  Unremarkable MRA of the head and neck  e. MRI brain 01/06/2021:Chronic changes seen within the periventricular and subcortical white matter with no abnormal contrast-enhancement.  No acute intracranial abnormality.  Unremarkable MRA of the head and neck  f. Echocardiogram 02/14/2021: LVEF 68%, RVSP less than 35 mmHg, normal RV cavity size, wall thickness, systolic function and septal motion noted.  Aortic valve exhibits mild sclerosis  g. Residual class I symptoms with normal electrocardiogram with subsequent acceptable event monitor, February 2021, July 2021, February 2022, August 2022, May 2023  2. Palpitations with abnormal event monitor January 2021, with preliminary demonstrating brief asymptomatic PSVT, no atrial  fibrillation/flutter, AV block, or ventricular tachycardia; palpitations have ceased since decreasing caffeine consumption February 2021  3. Dizziness, [facility] ED visit March 2023 for vertigo/sinus infection  4. Hypertension  5. Hyperlipidemia; on statin therapy  6. GERD/hiatal hernia  7. Rheumatoid arthritis  8. Essential tremor  9. Peripheral neuropathy  10. History of Bell's palsy 2017  11. [facility] 2-day hospitalization January 2021 for acute pancreatitis  12. [facility] ED, May 2021, for constipation on opioid medication s/p right hip surgery. She was given mineral oil enema in the ED and prescribed mag citrate and hydrocortisone cream.  13. Systolic heart murmur  14. Surgical history:  a. Hysterectomy  b. Cholecystectomy  c. Left foot surgery  d. Right hip replacement, May 2021  e. Right knee replacement October 2022     Allergies   Allergen Reactions   • Losartan GI Intolerance   • Oxycodone Itching   • Quinolones    • Silver Nitrate Rash   • Sulfa Antibiotics Rash       Current Outpatient Medications   Medication Instructions   • amLODIPine (NORVASC) 5 mg, Oral, Every 24 Hours Scheduled   • cyclobenzaprine (FLEXERIL) 10 mg, Oral, Every Night at Bedtime   • folic acid (FOLVITE) 5 mg, Oral, Daily   • guaiFENesin (MUCINEX) 1,200 mg, Oral, 2 Times Daily   • inFLIXimab-dyyb (Inflectra) 100 MG injection Administer INFLECTRA 600mg IV every 8 weeks   • loratadine (CLARITIN REDITABS) 10 mg, Translingual, Daily   • Magnesium Oxide 400 MG capsule Every 12 Hours Scheduled   • methotrexate 20 mg, Oral, Weekly, Taken on Sundays   • montelukast (SINGULAIR) 10 MG tablet No dose, route, or frequency recorded.   • omeprazole (priLOSEC) 20 MG capsule No dose, route, or frequency recorded.   • Pancrelipase, Lip-Prot-Amyl, (Zenpep) 05922-357056 units capsule delayed-release particles capsule Zenpep 40,000 unit-126,000 unit-168,000 unit capsule,delayed release   10 sample boxes   • rosuvastatin (CRESTOR) 5 mg, Oral, Daily   • tretinoin (RETIN-A)  "0.05 % cream No dose, route, or frequency recorded.         HISTORY OF PRESENT ILLNESS:  The patient is here for 9-month follow-up.  In the interim the patient was seen in [facility] ED 3/1/2023 for vertigo and at that time she had a bad sinus infection.  She has been taking allergy medicine which helps.  She denies any chest pain, shortness of breath, presyncope, or syncope.  She occasionally will have palpitations but only if she eats chocolate or drinks caffeine.  She believes that her last laboratory testing was acceptable.  She will have her next set of labs sent to me for review.  She has been trying to do some walking for exercise.  She had a right knee replacement in October 2022.  She did physical therapy for about 6 months.  She says that she is only allowed to walk 1 mile currently.  She feels like she has gained weight over the winter and because she had her surgery and was more sedentary initially.      ROS   All other systems reviewed and otherwise negative.    Procedures       Objective:       Vitals:    05/26/23 0910 05/26/23 0913   BP: 136/87 129/86   BP Location: Right arm Right arm   Patient Position: Sitting Standing   Cuff Size: Adult Adult   Pulse: 82 83   SpO2: 97% 97%   Weight: 66.8 kg (147 lb 3.2 oz)    Height: 165.1 cm (65\")      Body mass index is 24.5 kg/m².  Last weight August 2022 was 148 pounds  Constitutional:       Appearance: Healthy appearance. Not in distress.   Neck:      Vascular: No JVR. JVD normal.   Pulmonary:      Effort: Pulmonary effort is normal.      Breath sounds: Normal breath sounds. No wheezing. No rhonchi. No rales.   Chest:      Chest wall: Not tender to palpatation.   Cardiovascular:      PMI at left midclavicular line. Normal rate. Regular rhythm. Normal S1. Normal S2.      Murmurs: There is a grade 2/6 systolic murmur at the URSB.      No gallop. No click. No rub.   Pulses:     Dorsalis pedis: 1+ bilaterally.     Posterior tibial: 1+ bilaterally.  Edema:     Peripheral " edema absent.   Abdominal:      General: Bowel sounds are normal.      Palpations: Abdomen is soft.      Tenderness: There is no abdominal tenderness.   Musculoskeletal: Normal range of motion.         General: No tenderness. Skin:     General: Skin is warm and dry.   Neurological:      General: No focal deficit present.      Mental Status: Alert and oriented to person, place and time.           Lab Review:   Lab Results   Component Value Date    GLUCOSE 104 (H) 03/01/2023    BUN 20 03/01/2023    CREATININE 0.67 03/01/2023    EGFRIFNONA 79 05/13/2021    BCR 29.9 (H) 03/01/2023    CO2 26.0 03/01/2023    CALCIUM 9.1 03/01/2023    ALBUMIN 4.2 03/01/2023    AST 21 03/01/2023    ALT 24 03/01/2023       Lab Results   Component Value Date    WBC 9.65 03/01/2023    HGB 13.7 03/01/2023    HCT 41.4 03/01/2023    MCV 91.6 03/01/2023     03/01/2023         Lab Results   Component Value Date    CHOL 128 01/13/2021     Lab Results   Component Value Date    TRIG 66 01/13/2021     Lab Results   Component Value Date    HDL 65 (H) 01/13/2021     Lab Results   Component Value Date    LDL 49 01/13/2021     Advance Care Planning   ACP discussion was held with the patient during this visit. Patient has an advance directive (not in EMR), copy requested.         Assessment:     Overall continued acceptable course with no new interim cardiopulmonary complaints with acceptable functional status. We will defer additional diagnostic or therapeutic intervention from a cardiac perspective at this time other than an echocardiogram same day as next appointment to assess the patient's systolic heart murmur.  Hopefully I will be able to review the patient's next laboratory testing results.       Diagnosis Plan   1. Palpitations   No persistent palpitations      2. Essential hypertension   Controlled, continue current cardiac medications      3. Hyperlipidemia LDL goal <100   No new labs to review, continue rosuvastatin   4.  Systolic heart  murmur: Echocardiogram          Plan:         1. Patient to continue current medications and close follow up with the above providers.  2. Tentative cardiology follow up in February 2024 or patient may return sooner PRN.  3. Echocardiogram same day as next appointment      Electronically signed by BJ See, 05/26/23, 9:43 AM EDT.

## 2023-05-26 ENCOUNTER — OFFICE VISIT (OUTPATIENT)
Dept: CARDIOLOGY | Facility: CLINIC | Age: 68
End: 2023-05-26
Payer: MEDICARE

## 2023-05-26 VITALS
OXYGEN SATURATION: 97 % | WEIGHT: 147.2 LBS | BODY MASS INDEX: 24.53 KG/M2 | HEIGHT: 65 IN | HEART RATE: 83 BPM | DIASTOLIC BLOOD PRESSURE: 86 MMHG | SYSTOLIC BLOOD PRESSURE: 129 MMHG

## 2023-05-26 DIAGNOSIS — R00.2 PALPITATIONS: Primary | ICD-10-CM

## 2023-05-26 DIAGNOSIS — R01.1 HEART MURMUR, SYSTOLIC: ICD-10-CM

## 2023-05-26 DIAGNOSIS — E78.5 HYPERLIPIDEMIA LDL GOAL <100: ICD-10-CM

## 2023-05-26 DIAGNOSIS — I10 ESSENTIAL HYPERTENSION: ICD-10-CM

## 2023-05-26 PROCEDURE — 1159F MED LIST DOCD IN RCRD: CPT | Performed by: NURSE PRACTITIONER

## 2023-05-26 PROCEDURE — 1160F RVW MEDS BY RX/DR IN RCRD: CPT | Performed by: NURSE PRACTITIONER

## 2023-05-26 PROCEDURE — 3079F DIAST BP 80-89 MM HG: CPT | Performed by: NURSE PRACTITIONER

## 2023-05-26 PROCEDURE — 99214 OFFICE O/P EST MOD 30 MIN: CPT | Performed by: NURSE PRACTITIONER

## 2023-05-26 PROCEDURE — 3074F SYST BP LT 130 MM HG: CPT | Performed by: NURSE PRACTITIONER

## 2023-05-26 RX ORDER — MONTELUKAST SODIUM 10 MG/1
TABLET ORAL
COMMUNITY
Start: 2023-04-08

## 2023-05-26 RX ORDER — ROSUVASTATIN CALCIUM 5 MG/1
5 TABLET, COATED ORAL DAILY
Qty: 90 TABLET | Refills: 3 | Status: SHIPPED | OUTPATIENT
Start: 2023-05-26

## 2023-05-26 RX ORDER — OMEPRAZOLE 20 MG/1
CAPSULE, DELAYED RELEASE ORAL
COMMUNITY
Start: 2023-05-19

## 2023-05-26 RX ORDER — AMLODIPINE BESYLATE 5 MG/1
5 TABLET ORAL
Qty: 90 TABLET | Refills: 3 | Status: SHIPPED | OUTPATIENT
Start: 2023-05-26

## 2023-05-26 RX ORDER — TRETINOIN 0.5 MG/G
CREAM TOPICAL
COMMUNITY
Start: 2023-04-29

## 2023-05-26 RX ORDER — GUAIFENESIN 600 MG/1
1200 TABLET, EXTENDED RELEASE ORAL 2 TIMES DAILY
COMMUNITY

## 2023-05-26 RX ORDER — CALCIUM CARBONATE/VITAMIN D3 500-10/5ML
LIQUID (ML) ORAL EVERY 12 HOURS SCHEDULED
COMMUNITY

## 2023-05-26 RX ORDER — CYCLOBENZAPRINE HCL 10 MG
1 TABLET ORAL EVERY 24 HOURS
COMMUNITY
Start: 2023-04-12 | End: 2023-05-26 | Stop reason: SDUPTHER

## 2023-05-26 RX ORDER — PANCRELIPASE LIPASE, PANCRELIPASE PROTEASE, PANCRELIPASE AMYLASE 40000; 126000; 168000 [USP'U]/1; [USP'U]/1; [USP'U]/1
CAPSULE, DELAYED RELEASE ORAL
COMMUNITY
Start: 2022-09-10

## 2024-03-25 NOTE — PROGRESS NOTES
Subjective:     Encounter Date:03/29/2024      Patient ID: Alaina Kennedy is a 69 y.o.  white female from [place], [place], retired as a  at [employer].     REFERRING PROVIDER: BJ Mosquera  PHYSICIAN: Ilda Suarez MD   NEUROLOGIST: BJ Steven  RHEUMATOLOGIST: Giuliano Mckoy MD  ORTHOPEDIC SURGEON: Reece Vaz MD.    Chief Complaint:   Chief Complaint   Patient presents with    Palpitations     Problem List:  Pre-syncope, likely vasovagal in the setting of pancreatitis  Echocardiogram 9/13/2017: LVEF 60%, LV diastolic dysfunction grade 1 consistent with impaired relaxation, RVSP 10.6 mmHg  Regadenoson stress test 10/05/2017: Test is negative for anginal symptoms or diagnostic ST segment changes, LVEF greater than 70%, myocardial perfusion imaging indicates a normal myocardial perfusion study with no evidence of ischemia  [facility] ED January 2021 presyncope, hypertension in the setting of acute pancreatitis  MRA head/neck 01/06/2021: Chronic changes seen within the periventricular and subcortical white matter with no abnormal contrast-enhancement.  No acute intracranial abnormality.  Unremarkable MRA of the head and neck  MRI brain 01/06/2021:Chronic changes seen within the periventricular and subcortical white matter with no abnormal contrast-enhancement.  No acute intracranial abnormality.  Unremarkable MRA of the head and neck  Echocardiogram 02/14/2021: LVEF 68%, RVSP less than 35 mmHg, normal RV cavity size, wall thickness, systolic function and septal motion noted.  Aortic valve exhibits mild sclerosis  Residual class I symptoms with normal electrocardiogram with subsequent acceptable event monitor, February 2021, July 2021, February 2022, August 2022, May 2023, March 2024  Palpitations with abnormal event monitor January 2021, with preliminary demonstrating brief asymptomatic PSVT, no atrial fibrillation/flutter, AV block, or ventricular tachycardia;  palpitations have ceased since decreasing caffeine consumption February 2021  Dizziness, [facility] ED visit March 2023 for vertigo/sinus infection  Hypertension  Hyperlipidemia; on statin therapy  GERD/hiatal hernia  Rheumatoid arthritis  Essential tremor  Peripheral neuropathy  History of Bell's palsy 2017  [facility] 2-day hospitalization January 2021 for acute pancreatitis  [facility] ED, May 2021, for constipation on opioid medication s/p right hip surgery. She was given mineral oil enema in the ED and prescribed mag citrate and hydrocortisone cream.  Systolic heart murmur  COVID in 2022, January 2024 with sinus congestion  Surgical history:  Hysterectomy  Cholecystectomy  Left foot surgery  Right hip replacement, May 2021  Right knee replacement October 2022  Left foot surgery to remove screw    Allergies   Allergen Reactions    Losartan GI Intolerance    Oxycodone Itching    Quinolones     Silver Nitrate Rash    Sulfa Antibiotics Rash       Current Outpatient Medications   Medication Instructions    amLODIPine (NORVASC) 5 mg, Oral, Every 24 Hours Scheduled    azelastine (ASTELIN) 0.1 % nasal spray 2 sprays, Nasal, 2 Times Daily, Use in each nostril as directed    Calcium Carbonate-Vitamin D 600-10 MG-MCG per tablet 1 tablet, Oral, Daily    CeleBREX 200 MG capsule Take 1 capsule twice a day by oral route.    cyclobenzaprine (FLEXERIL) 10 mg, Oral, Every Night at Bedtime    fluticasone (FLONASE) 50 MCG/ACT nasal spray 2 sprays, Nasal, Daily    folic acid (FOLVITE) 5 mg, Oral, Daily    guaiFENesin (MUCINEX) 1,200 mg, Oral, 2 Times Daily    inFLIXimab (Remicade) 100 MG injection 7 mg/kg (500 mg) body weight IV q 8 weeks    loratadine (CLARITIN REDITABS) 10 mg, Translingual, Daily    Magnesium Oxide 400 MG capsule Every 12 Hours Scheduled    methotrexate 20 mg, Oral, Weekly, Taken on Sundays    montelukast (SINGULAIR) 10 MG tablet No dose, route, or frequency recorded.    omeprazole (priLOSEC) 20 MG capsule No dose, route, or  frequency recorded.    Pancrelipase, Lip-Prot-Amyl, (Zenpep) [id number] units capsule delayed-release particles capsule Zenpep 40,000 unit-126,000 unit-168,000 unit capsule,delayed release   10 sample boxes    Psyllium (Metamucil) 0.36 g capsule Daily    rosuvastatin (CRESTOR) 5 mg, Oral, Daily    tretinoin (RETIN-A) 0.05 % cream No dose, route, or frequency recorded.         HISTORY OF PRESENT ILLNESS:  Patient is here for 9-month follow-up.The patient had an echocardiogram before coming to our office today; this will be read, and a letter will be sent to the patient with those results.  The patient went on a cruise in January 2024 and came back with COVID.  She thought that she just had a bad sinus infection.  She did not have any shortness of breath or any other symptoms other than just sinus congestion.  She did not get Paxlovid.  The patient has had some prednisone which makes her heart feel like it flutters occasionally whenever she takes it.  Otherwise she does not experience palpitations any other time.  She denies any chest pain, shortness of breath, dizziness, presyncope, or syncope.  She is planning on a trip to [place] for 3 weeks in June 2024.  She has upcoming laboratory testing with her PCP and will try to have this sent to me for review.  The patient had left foot surgery this year as well because remotely she had a screw placed in her metatarsal and it started coming out.  She has been on limited activity and been wearing a boot until this week.      ROS   All other systems reviewed and otherwise negative.      ECG 12 Lead    Date/Time: 3/29/2024 9:32 AM  Performed by: Pilar Harrington APRN    Authorized by: Pilar Harrington APRN  Rhythm comments: Normal sinus rhythm, cannot rule out anterior infarct, age undetermined, abnormal ECG, 67 bpm, QRS 80 ms, QTc 412 ms,  ms, no significant changes from ECG in March 2023             Objective:       Vitals:    03/29/24 0858 03/29/24 0859 03/29/24  "0929   BP: 138/66 136/72 130/60   BP Location: Right arm Right arm Right arm   Patient Position: Sitting Standing Sitting   Pulse: 73 75    SpO2: 99%     Weight: 69.9 kg (154 lb)     Height: 162.6 cm (64\")       Body mass index is 26.43 kg/m².  Wt Readings from Last 2 Encounters:   03/29/24 69.9 kg (154 lb)   03/29/24 66.7 kg (147 lb)        Constitutional:       Appearance: Healthy appearance. Not in distress.   Neck:      Vascular: No JVR. JVD normal.   Pulmonary:      Effort: Pulmonary effort is normal.      Breath sounds: Normal breath sounds. No wheezing. No rhonchi. No rales.   Chest:      Chest wall: Not tender to palpatation.   Cardiovascular:      PMI at left midclavicular line. Normal rate. Regular rhythm. Normal S1. Normal S2.       Murmurs: There is a grade 2/6 systolic murmur at the URSB.      No gallop.  No click. No rub.   Pulses:     Intact distal pulses.   Edema:     Peripheral edema absent.   Abdominal:      General: Bowel sounds are normal.      Palpations: Abdomen is soft.      Tenderness: There is no abdominal tenderness.   Musculoskeletal: Normal range of motion.         General: No tenderness. Skin:     General: Skin is warm and dry.   Neurological:      General: No focal deficit present.      Mental Status: Alert and oriented to person, place and time.           Lab Review:   Lab Results   Component Value Date    GLUCOSE 104 (H) 03/01/2023    BUN 20 03/01/2023    CREATININE 0.67 03/01/2023    EGFRIFNONA 79 05/13/2021    BCR 29.9 (H) 03/01/2023    CO2 26.0 03/01/2023    CALCIUM 9.1 03/01/2023    ALBUMIN 4.2 03/01/2023    AST 21 03/01/2023    ALT 24 03/01/2023       Lab Results   Component Value Date    WBC 9.65 03/01/2023    HGB 13.7 03/01/2023    HCT 41.4 03/01/2023    MCV 91.6 03/01/2023     03/01/2023       Lab Results   Component Value Date    CHOL 128 01/13/2021     Lab Results   Component Value Date    TRIG 66 01/13/2021     Lab Results   Component Value Date    HDL 65 (H) " 01/13/2021     Lab Results   Component Value Date    LDL 49 01/13/2021           Results for orders placed during the hospital encounter of 02/14/21    Adult Transthoracic Echo Complete W/ Cont if Necessary Per Protocol    Interpretation Summary  · Estimated left ventricular EF = 68% Estimated left ventricular EF was in agreement with the calculated left ventricular EF. Left ventricular ejection fraction appears to be 66 - 70%. Left ventricular systolic function is normal.  · Estimated right ventricular systolic pressure from tricuspid regurgitation is normal (<35 mmHg).  · Left ventricular diastolic function was normal.  · Normal right ventricular cavity size, wall thickness, systolic function and septal motion noted.  · The aortic valve exhibits mild sclerosis.  · No evidence of pulmonary hypertension is present.  · There is no evidence of pericardial effusion.     6/13/2023:  Hemoglobin A1c 5.6%  CMP: Sodium 143, potassium 4.2, chloride 106, carbon dioxide 26, glucose 93, BUN 16, creatinine 0.73, calcium 9.3, protein 7.2, albumin 4.4, globulin 2.8, alkaline phosphatase 78, ALT 30, AST 24, bilirubin 0.6, GFR greater than 60  CBC: WBC 6.1, RBC 4.77, hemoglobin 15.2, hematocrit 44.7, MCV 94, MCH 31.9, MCHC 34, platelets 281, MPV 9, RDW 13.9, neutrophils 52, lymphocytes 35.9, monocytes 9.3, eos 2.3, basos 0.3  TSH 2.32  Lipid panel: Cholesterol 143, triglycerides 84, HDL 60, LDL 66  Magnesium 2.2       Advance Care Planning   ACP discussion was held with the patient during this visit. Patient has an advance directive (not in EMR), copy requested.      Assessment:     Overall continued acceptable course with no new interim cardiopulmonary complaints with acceptable functional status. We will defer additional diagnostic or therapeutic intervention from a cardiac perspective at this time.The patient had an echocardiogram before coming to our office today; this will be read, and a letter will be sent to the patient with  those results. Hopefully I will get to review the patient's next laboratory testing results.         Diagnosis Plan   1. Palpitations  Only when taking prednisone or drinking caffeine      2. Essential hypertension  Controlled, continue current cardiac medications      3. Hyperlipidemia LDL goal <100  Acceptable lipid panel June 2023, continue rosuvastatin             Plan:         Patient to continue current medications and close follow up with the above providers.  Tentative cardiology follow up in September 2024 or patient may return sooner PRN.  The patient had an echocardiogram before coming to our office today; this will be read, and a letter will be sent to the patient with those results.       Electronically signed by BJ See, 03/29/24, 9:33 AM EDT.

## 2024-03-29 ENCOUNTER — OFFICE VISIT (OUTPATIENT)
Dept: CARDIOLOGY | Facility: CLINIC | Age: 69
End: 2024-03-29
Payer: MEDICARE

## 2024-03-29 ENCOUNTER — HOSPITAL ENCOUNTER (OUTPATIENT)
Dept: CARDIOLOGY | Facility: HOSPITAL | Age: 69
Discharge: HOME | End: 2024-03-29
Payer: MEDICARE

## 2024-03-29 VITALS — BODY MASS INDEX: 24.49 KG/M2 | WEIGHT: 147 LBS | HEIGHT: 65 IN

## 2024-03-29 VITALS
HEIGHT: 64 IN | BODY MASS INDEX: 26.29 KG/M2 | HEART RATE: 75 BPM | OXYGEN SATURATION: 99 % | DIASTOLIC BLOOD PRESSURE: 60 MMHG | WEIGHT: 154 LBS | SYSTOLIC BLOOD PRESSURE: 130 MMHG

## 2024-03-29 DIAGNOSIS — R00.2 PALPITATIONS: Primary | ICD-10-CM

## 2024-03-29 DIAGNOSIS — E78.5 HYPERLIPIDEMIA LDL GOAL <100: ICD-10-CM

## 2024-03-29 DIAGNOSIS — I10 ESSENTIAL HYPERTENSION: ICD-10-CM

## 2024-03-29 LAB
BH CV ECHO MEAS - AO MAX PG: 8 MMHG
BH CV ECHO MEAS - AO MEAN PG: 4 MMHG
BH CV ECHO MEAS - AO ROOT DIAM: 2.43 CM
BH CV ECHO MEAS - AO V2 MAX: 141 CM/SEC
BH CV ECHO MEAS - AO V2 VTI: 27.2 CM
BH CV ECHO MEAS - AVA(I,D): 2.17 CM2
BH CV ECHO MEAS - EDV(CUBED): 123.2 ML
BH CV ECHO MEAS - EDV(MOD-SP2): 63.3 ML
BH CV ECHO MEAS - EDV(MOD-SP4): 66.7 ML
BH CV ECHO MEAS - EF(MOD-BP): 59 %
BH CV ECHO MEAS - EF(MOD-SP2): 63 %
BH CV ECHO MEAS - EF(MOD-SP4): 53.4 %
BH CV ECHO MEAS - ESV(CUBED): 26.7 ML
BH CV ECHO MEAS - ESV(MOD-SP2): 23.4 ML
BH CV ECHO MEAS - ESV(MOD-SP4): 31.1 ML
BH CV ECHO MEAS - FS: 39.9 %
BH CV ECHO MEAS - IVS/LVPW: 0.93 CM
BH CV ECHO MEAS - IVSD: 0.8 CM
BH CV ECHO MEAS - LA DIMENSION: 3.3 CM
BH CV ECHO MEAS - LAT PEAK E' VEL: 7 CM/SEC
BH CV ECHO MEAS - LV DIASTOLIC VOL/BSA (35-75): 38.4 CM2
BH CV ECHO MEAS - LV MASS(C)D: 140.9 GRAMS
BH CV ECHO MEAS - LV MAX PG: 4 MMHG
BH CV ECHO MEAS - LV MEAN PG: 2 MMHG
BH CV ECHO MEAS - LV SYSTOLIC VOL/BSA (12-30): 17.9 CM2
BH CV ECHO MEAS - LV V1 MAX: 99.4 CM/SEC
BH CV ECHO MEAS - LV V1 VTI: 18.4 CM
BH CV ECHO MEAS - LVIDD: 5 CM
BH CV ECHO MEAS - LVIDS: 3 CM
BH CV ECHO MEAS - LVOT AREA: 3.2 CM2
BH CV ECHO MEAS - LVOT DIAM: 2.02 CM
BH CV ECHO MEAS - LVPWD: 0.86 CM
BH CV ECHO MEAS - MED PEAK E' VEL: 7.6 CM/SEC
BH CV ECHO MEAS - MV A MAX VEL: 96.8 CM/SEC
BH CV ECHO MEAS - MV DEC SLOPE: 306.4 CM/SEC2
BH CV ECHO MEAS - MV DEC TIME: 0.22 SEC
BH CV ECHO MEAS - MV E MAX VEL: 63 CM/SEC
BH CV ECHO MEAS - MV E/A: 0.65
BH CV ECHO MEAS - MV MAX PG: 3.7 MMHG
BH CV ECHO MEAS - MV MEAN PG: 1.96 MMHG
BH CV ECHO MEAS - MV P1/2T: 80.4 MSEC
BH CV ECHO MEAS - MV V2 VTI: 27.7 CM
BH CV ECHO MEAS - MVA(P1/2T): 2.7 CM2
BH CV ECHO MEAS - MVA(VTI): 2.12 CM2
BH CV ECHO MEAS - PA ACC TIME: 0.15 SEC
BH CV ECHO MEAS - RAP SYSTOLE: 3 MMHG
BH CV ECHO MEAS - RVSP: 18 MMHG
BH CV ECHO MEAS - SI(MOD-SP2): 23 ML/M2
BH CV ECHO MEAS - SI(MOD-SP4): 20.5 ML/M2
BH CV ECHO MEAS - SV(LVOT): 58.9 ML
BH CV ECHO MEAS - SV(MOD-SP2): 39.9 ML
BH CV ECHO MEAS - SV(MOD-SP4): 35.6 ML
BH CV ECHO MEAS - TAPSE (>1.6): 2.39 CM
BH CV ECHO MEAS - TR MAX PG: 16.2 MMHG
BH CV ECHO MEAS - TR MAX VEL: 197.5 CM/SEC
BH CV ECHO MEASUREMENTS AVERAGE E/E' RATIO: 8.63
BH CV VAS BP RIGHT ARM: NORMAL MMHG
BH CV XLRA - RV BASE: 3 CM
BH CV XLRA - RV LENGTH: 7.2 CM
BH CV XLRA - RV MID: 2.38 CM
BH CV XLRA - TDI S': 14.5 CM/SEC
LEFT ATRIUM VOLUME INDEX: 19.3 ML/M2

## 2024-03-29 PROCEDURE — 99214 OFFICE O/P EST MOD 30 MIN: CPT | Performed by: NURSE PRACTITIONER

## 2024-03-29 PROCEDURE — 93000 ELECTROCARDIOGRAM COMPLETE: CPT | Performed by: NURSE PRACTITIONER

## 2024-03-29 PROCEDURE — 3078F DIAST BP <80 MM HG: CPT | Performed by: NURSE PRACTITIONER

## 2024-03-29 PROCEDURE — 1160F RVW MEDS BY RX/DR IN RCRD: CPT | Performed by: NURSE PRACTITIONER

## 2024-03-29 PROCEDURE — 3075F SYST BP GE 130 - 139MM HG: CPT | Performed by: NURSE PRACTITIONER

## 2024-03-29 PROCEDURE — 1159F MED LIST DOCD IN RCRD: CPT | Performed by: NURSE PRACTITIONER

## 2024-03-29 PROCEDURE — 93306 TTE W/DOPPLER COMPLETE: CPT

## 2024-03-29 RX ORDER — AZELASTINE 1 MG/ML
2 SPRAY, METERED NASAL 2 TIMES DAILY
COMMUNITY

## 2024-03-29 RX ORDER — FLUTICASONE PROPIONATE 50 MCG
2 SPRAY, SUSPENSION (ML) NASAL DAILY
COMMUNITY

## 2024-03-29 RX ORDER — PSYLLIUM HUSK 0.4 G
CAPSULE ORAL DAILY
COMMUNITY

## 2024-03-29 RX ORDER — INFLIXIMAB 100 MG/10ML
INJECTION, POWDER, LYOPHILIZED, FOR SOLUTION INTRAVENOUS
COMMUNITY
Start: 2024-03-12

## 2024-09-26 NOTE — PROGRESS NOTES
Subjective:     Encounter Date:10/02/2024      Patient ID: Alaina Kennedy is a 69 y.o.   white female from [place], [place], retired as a  at [employer].     REFERRING PROVIDER: BJ Mosquera  PHYSICIAN: Ilda Suarez MD   NEUROLOGIST: BJ Steven  RHEUMATOLOGIST: Giuliano Mckoy MD  ORTHOPEDIC SURGEON: Reece Vaz MD.     Chief Complaint:   Chief Complaint   Patient presents with    Palpitations     Problem List:  Pre-syncope, likely vasovagal in the setting of pancreatitis  Echocardiogram 9/13/2017: LVEF 60%, LV diastolic dysfunction grade 1 consistent with impaired relaxation, RVSP 10.6 mmHg  Regadenoson stress test 10/05/2017: Test is negative for anginal symptoms or diagnostic ST segment changes, LVEF greater than 70%, myocardial perfusion imaging indicates a normal myocardial perfusion study with no evidence of ischemia  [facility] ED January 2021 presyncope, hypertension in the setting of acute pancreatitis  MRA head/neck 01/06/2021: Chronic changes seen within the periventricular and subcortical white matter with no abnormal contrast-enhancement.  No acute intracranial abnormality.  Unremarkable MRA of the head and neck  MRI brain 01/06/2021:Chronic changes seen within the periventricular and subcortical white matter with no abnormal contrast-enhancement.  No acute intracranial abnormality.  Unremarkable MRA of the head and neck  Echocardiogram 02/14/2021: LVEF 68%, RVSP less than 35 mmHg, normal RV cavity size, wall thickness, systolic function and septal motion noted.  Aortic valve exhibits mild sclerosis  Residual class I symptoms with normal electrocardiogram with subsequent acceptable event monitor, February 2021, July 2021, February 2022, August 2022, May 2023, March 2024  Echocardiogram 3/29/2024: LVEF 59%, decreased tissue Doppler velocities without other criteria for diastolic dysfunction, trace MR, trace TR  Palpitations with abnormal event monitor  January 2021, with preliminary demonstrating brief asymptomatic PSVT, no atrial fibrillation/flutter, AV block, or ventricular tachycardia; palpitations have ceased since decreasing caffeine consumption February 2021  Dizziness, [facility] ED visit March 2023 for vertigo/sinus infection  Hypertension  Hyperlipidemia; on statin therapy  GERD/hiatal hernia  Rheumatoid arthritis  Essential tremor  Peripheral neuropathy  History of Bell's palsy 2017  [facility] 2-day hospitalization January 2021 for acute pancreatitis  [facility] ED, May 2021, for constipation on opioid medication s/p right hip surgery. She was given mineral oil enema in the ED and prescribed mag citrate and hydrocortisone cream.  Systolic heart murmur  COVID in 2022, January 2024 with sinus congestion  Surgical history:  Hysterectomy  Cholecystectomy  Left foot surgery  Right hip replacement, May 2021  Right knee replacement October 2022  Left foot surgery to remove screw    Allergies   Allergen Reactions    Losartan GI Intolerance    Oxycodone Itching    Quinolones     Silver Nitrate Rash    Sulfa Antibiotics Rash       Current Outpatient Medications   Medication Instructions    amLODIPine (NORVASC) 5 mg, Oral, Every 24 Hours Scheduled    azelastine (ASTELIN) 0.1 % nasal spray 2 sprays, Nasal, 2 Times Daily, Use in each nostril as directed    Calcium Carbonate-Vitamin D 600-10 MG-MCG per tablet 1 tablet, Oral, Daily    CeleBREX 200 MG capsule Take 1 capsule twice a day by oral route.    cyclobenzaprine (FLEXERIL) 10 mg, Oral, Every Night at Bedtime    fluticasone (FLONASE) 50 MCG/ACT nasal spray 2 sprays, Nasal, Daily    folic acid (FOLVITE) 5 mg, Oral, Daily    guaiFENesin (MUCINEX) 1,200 mg, Oral, 2 Times Daily    inFLIXimab (Remicade) 100 MG injection 7 mg/kg (500 mg) body weight IV q 8 weeks    loratadine (CLARITIN REDITABS) 10 mg, Translingual, Daily    Magnesium Oxide 400 MG capsule Every 12 Hours Scheduled    methotrexate 20 mg, Oral, Weekly, Taken on Sundays     "montelukast (SINGULAIR) 10 MG tablet No dose, route, or frequency recorded.    omeprazole (priLOSEC) 20 MG capsule No dose, route, or frequency recorded.    onabotulinumtoxina (BOTOX) 3 Units, Intramuscular    Pancrelipase, Lip-Prot-Amyl, (Zenpep) [id number] units capsule delayed-release particles capsule Zenpep 40,000 unit-126,000 unit-168,000 unit capsule,delayed release   10 sample boxes    Psyllium (Metamucil) 0.36 g capsule Daily    rosuvastatin (CRESTOR) 5 mg, Oral, Daily    tretinoin (RETIN-A) 0.05 % cream No dose, route, or frequency recorded.         HISTORY OF PRESENT ILLNESS:  The patient is here for a 6-month follow-up.  Echocardiogram March 2024 showed LVEF 59%, decreased tissue Doppler velocities without other criteria for diastolic dysfunction, trace MR, trace TR. Patient had labs with her PCP April 2024.  The patient had a good trip to [place], [place] ,and [place] over the summer.  She denies any chest pain, shortness of breath, presyncope, or syncope.  She feels like her palpitations are less than they used to be.  She is back to drinking an iced tea at lunch but that is the only caffeine that she is drinking.  She would like to lose some weight and says that she tries to adhere to a very heart healthy diet.  However, she is not able to do as much activity as she would like due to rheumatoid arthritis.  She recently found that she has a bone sticking up on her right foot.  She currently has it wrapped and is supposed to see orthopedics in 2 weeks.  She checks her blood pressure about once a week and it has been WNL.      ROS   All other systems reviewed and otherwise negative.    Procedures       Objective:       Vitals:    10/02/24 1016 10/02/24 1018   BP: 116/70 118/68   BP Location: Right arm Right arm   Patient Position: Sitting Standing   Pulse: 73 71   SpO2: 98% 97%   Weight: 69 kg (152 lb 3.2 oz)    Height: 163.8 cm (64.5\")      Body mass index is 25.72 kg/m².  Wt Readings from Last 2 " Encounters:   10/02/24 69 kg (152 lb 3.2 oz)   03/29/24 66.7 kg (147 lb)        Constitutional:       Appearance: Healthy appearance. Not in distress.   Neck:      Vascular: No JVR. JVD normal.   Pulmonary:      Effort: Pulmonary effort is normal.      Breath sounds: Normal breath sounds. No wheezing. No rhonchi. No rales.   Chest:      Chest wall: Not tender to palpatation.   Cardiovascular:      PMI at left midclavicular line. Normal rate. Regular rhythm. Normal S1. Normal S2.       Murmurs: There is a grade 2/6 systolic murmur at the URSB.      No gallop.  No click. No rub.   Pulses:     Intact distal pulses.   Edema:     Peripheral edema absent.   Abdominal:      General: Bowel sounds are normal.      Palpations: Abdomen is soft.      Tenderness: There is no abdominal tenderness.   Musculoskeletal: Normal range of motion.         General: No tenderness. Skin:     General: Skin is warm and dry.   Neurological:      General: No focal deficit present.      Mental Status: Alert and oriented to person, place and time.           Lab Review:   Lab Results   Component Value Date    GLUCOSE 104 (H) 03/01/2023    BUN 20 03/01/2023    CREATININE 0.67 03/01/2023    EGFRIFNONA 79 05/13/2021    BCR 29.9 (H) 03/01/2023    CO2 26.0 03/01/2023    CALCIUM 9.1 03/01/2023    ALBUMIN 4.2 03/01/2023    AST 21 03/01/2023    ALT 24 03/01/2023       Lab Results   Component Value Date    WBC 9.65 03/01/2023    HGB 13.7 03/01/2023    HCT 41.4 03/01/2023    MCV 91.6 03/01/2023     03/01/2023         Lab Results   Component Value Date    CHOL 128 01/13/2021     Lab Results   Component Value Date    TRIG 66 01/13/2021     Lab Results   Component Value Date    HDL 65 (H) 01/13/2021     Lab Results   Component Value Date    LDL 49 01/13/2021 6/13/2023:  Hemoglobin A1c 5.6%  CMP: Sodium 143, potassium 4.2, chloride 106, carbon dioxide 26, glucose 93, BUN 16, creatinine 0.73, calcium 9.3, protein 7.2, albumin 4.4, globulin 2.8,  alkaline phosphatase 78, ALT 30, AST 24, bilirubin 0.6, GFR greater than 60  CBC: WBC 6.1, RBC 4.77, hemoglobin 15.2, hematocrit 44.7, MCV 94, MCH 31.9, MCHC 34, platelets 281, MPV 9, RDW 13.9, neutrophils 52, lymphocytes 35.9, monocytes 9.3, eos 2.3, basos 0.3  TSH 2.32  Lipid panel: Cholesterol 143, triglycerides 84, HDL 60, LDL 66  Magnesium 2.2    4/23/2024:  CMP: Sodium 143, potassium 4, chloride 105, carbon dioxide 26, glucose 89, BUN 19, creatinine 0.76, calcium 8.6, GFR 85, protein 7.2, albumin 4.4, globulin 2.8, alkaline phosphatase 75, ALT 20, AST 16, bilirubin 0.5  Hemoglobin A1c 5.7%  Vitamin D-40.6  Free thyroxine-0.95  CBC: WBC 7.1, RBC 4.71, hemoglobin 14.9, hematocrit 44.3, MCV 94, MCH 31.6, MCHC 33.6, platelets 343, MPV 9.2, RDW 13.2, neutrophils 30.3, lymphocytes 59.4, monocytes 7.1, eos 2.8, basos 0.3  Lipid panel: Cholesterol 140, triglycerides 129, HDL 52, LDL 62  TSH 6.35          Results for orders placed in visit on 05/26/23    Adult Transthoracic Echo Complete W/ Cont if Necessary Per Protocol    Interpretation Summary    Left ventricular systolic function is normal. Calculated left ventricular EF = 59% Normal left ventricular cavity size and wall thickness noted. All left ventricular wall segments contract normally. Normal left atrial pressure estimated by E/e' ratio. Decreased tissue doppler velocities without other criteria for diastolic dysfunction.    Normal right ventricular cavity size, wall thickness, systolic function and septal motion noted.    Normal sized atria.    The aortic valve is structurally normal with no regurgitation or stenosis present.    The mitral valve is structurally normal with no significant stenosis present. Trace mitral valve regurgitation is present.    The tricuspid valve is structurally normal with no significant stenosis present. Trace tricuspid valve regurgitation is present. Estimated right ventricular systolic pressure from tricuspid regurgitation is  normal (<35 mmHg).            Advance Care Planning   ACP discussion was held with the patient during this visit. Patient has an advance directive (not in EMR), copy requested.      Assessment:     Overall continued acceptable course with no new interim cardiopulmonary complaints with acceptable functional status. We will defer additional diagnostic or therapeutic intervention from a cardiac perspective at this time. Echocardiogram March 2024 showed LVEF 59%, decreased tissue Doppler velocities without other criteria for diastolic dysfunction, trace MR, trace TR.     Diagnosis Plan   1. Palpitations  Stable      2. Essential hypertension  Controlled, continue amlodipine      3. Hyperlipidemia LDL goal <100  Acceptable lipid panel April 2024, continue rosuvastatin             Plan:         Patient to continue current medications and close follow up with the above providers.  Tentative cardiology follow up in April 2025 or patient may return sooner PRN.      Electronically signed by BJ See, 10/02/24, 10:39 AM EDT.

## 2024-10-02 ENCOUNTER — OFFICE VISIT (OUTPATIENT)
Dept: CARDIOLOGY | Facility: CLINIC | Age: 69
End: 2024-10-02
Payer: MEDICARE

## 2024-10-02 VITALS
HEART RATE: 71 BPM | HEIGHT: 65 IN | DIASTOLIC BLOOD PRESSURE: 68 MMHG | SYSTOLIC BLOOD PRESSURE: 118 MMHG | WEIGHT: 152.2 LBS | OXYGEN SATURATION: 97 % | BODY MASS INDEX: 25.36 KG/M2

## 2024-10-02 DIAGNOSIS — E78.5 HYPERLIPIDEMIA LDL GOAL <100: ICD-10-CM

## 2024-10-02 DIAGNOSIS — R00.2 PALPITATIONS: Primary | ICD-10-CM

## 2024-10-02 DIAGNOSIS — I10 ESSENTIAL HYPERTENSION: ICD-10-CM

## 2024-10-02 PROCEDURE — 3074F SYST BP LT 130 MM HG: CPT | Performed by: NURSE PRACTITIONER

## 2024-10-02 PROCEDURE — 1159F MED LIST DOCD IN RCRD: CPT | Performed by: NURSE PRACTITIONER

## 2024-10-02 PROCEDURE — 1160F RVW MEDS BY RX/DR IN RCRD: CPT | Performed by: NURSE PRACTITIONER

## 2024-10-02 PROCEDURE — 99214 OFFICE O/P EST MOD 30 MIN: CPT | Performed by: NURSE PRACTITIONER

## 2024-10-02 PROCEDURE — 3078F DIAST BP <80 MM HG: CPT | Performed by: NURSE PRACTITIONER

## 2024-10-18 ENCOUNTER — PATIENT ROUNDING (BHMG ONLY) (OUTPATIENT)
Dept: NEUROLOGY | Facility: CLINIC | Age: 69
End: 2024-10-18
Payer: MEDICARE

## 2024-10-18 ENCOUNTER — TELEPHONE (OUTPATIENT)
Dept: NEUROLOGY | Facility: CLINIC | Age: 69
End: 2024-10-18

## 2024-10-18 ENCOUNTER — OFFICE VISIT (OUTPATIENT)
Dept: NEUROLOGY | Facility: CLINIC | Age: 69
End: 2024-10-18
Payer: MEDICARE

## 2024-10-18 VITALS
DIASTOLIC BLOOD PRESSURE: 70 MMHG | HEIGHT: 65 IN | BODY MASS INDEX: 25.33 KG/M2 | SYSTOLIC BLOOD PRESSURE: 130 MMHG | OXYGEN SATURATION: 95 % | WEIGHT: 152 LBS | HEART RATE: 79 BPM

## 2024-10-18 DIAGNOSIS — R26.89 IMPAIRMENT OF BALANCE: ICD-10-CM

## 2024-10-18 DIAGNOSIS — I73.9 MICROANGIOPATHY: Primary | ICD-10-CM

## 2024-10-18 DIAGNOSIS — G60.9 IDIOPATHIC PERIPHERAL NEUROPATHY: Primary | ICD-10-CM

## 2024-10-18 DIAGNOSIS — I73.9 MICROANGIOPATHY: ICD-10-CM

## 2024-10-18 DIAGNOSIS — R25.1 TREMOR: ICD-10-CM

## 2024-10-18 PROBLEM — M06.9 RHEUMATOID ARTHRITIS: Status: ACTIVE | Noted: 2019-08-09

## 2024-10-18 PROCEDURE — 3078F DIAST BP <80 MM HG: CPT | Performed by: NURSE PRACTITIONER

## 2024-10-18 PROCEDURE — 99214 OFFICE O/P EST MOD 30 MIN: CPT | Performed by: NURSE PRACTITIONER

## 2024-10-18 PROCEDURE — 1160F RVW MEDS BY RX/DR IN RCRD: CPT | Performed by: NURSE PRACTITIONER

## 2024-10-18 PROCEDURE — 1159F MED LIST DOCD IN RCRD: CPT | Performed by: NURSE PRACTITIONER

## 2024-10-18 PROCEDURE — 3075F SYST BP GE 130 - 139MM HG: CPT | Performed by: NURSE PRACTITIONER

## 2024-10-18 RX ORDER — GABAPENTIN 100 MG/1
CAPSULE ORAL
Qty: 180 CAPSULE | Refills: 5 | Status: SHIPPED | OUTPATIENT
Start: 2024-10-18

## 2024-10-18 NOTE — Clinical Note
Call jadon craft with [facility] rheumatology office and see if it is ok for her to take aspirin 81mg daily with her current medicines for rheumatoid arthritis. If ok, then can call patient and let her know to start.

## 2024-10-18 NOTE — PATIENT INSTRUCTIONS
Gabapentin 100mg capsules    Take 1 capsule at bedtime for 4 nights then 1 capsule 2 times a day for 4 days then 1 capsule 3 times per day. If not improving in 1 week, may increase to 2 capsules 3 times a day. If not better in 3-4 weeks, call us.

## 2024-10-18 NOTE — TELEPHONE ENCOUNTER
----- Message from Sera Macedo sent at 10/18/2024 11:36 AM EDT -----  Call jadon ross craft with [facility] rheumatology office and see if it is ok for her to take aspirin 81mg daily with her current medicines for rheumatoid arthritis. If ok, then can call patient and let her know to start.

## 2024-10-18 NOTE — TELEPHONE ENCOUNTER
I spoke with  for [facility] requesting a call to discuss the addition of Aspirin 81 mg daily?

## 2024-10-18 NOTE — LETTER
October 18, 2024     Ilda Suarez MD  [address]  [facility] [address]  [facility] [address]    Patient: Alaina Kennedy   YOB: 1955   Date of Visit: 10/18/2024       Dear Ilda Suarez MD    Alaina Kennedy was in my office today. Below is a copy of my note.    If you have questions, please do not hesitate to call me. I look forward to following Alaina along with you.         Sincerely,        BJ Lyman        CC: BJ Allen    Subjective:     Patient ID: Alaina Kennedy is a 69 y.o. female.    CC:   Chief Complaint   Patient presents with   • Peripheral Neuropathy     Both legs and feet       HPI:   History of Present Illness  This is a 69[facility]    She was last seen in our clinic on 07/18/2019 for mild bilateral tremors with a family history of tremor, known peripheral neuropathy, and moderate axonal bilateral lower extremities on EMG and NCVS completed on 09/20/2018. She has followed with rheumatology for rheumatoid arthritis and is currently seeing BJ Allen at [facility] for rheumatology. She was referred back to our clinic by her primary care provider for treatment of her polyneuropathy. Neuropathy symptoms began in 2004 and have worsened.     She reports worsening neuropathy, characterized by constant numbness and tingling in her feet which goes up into bilateral thighs. She experiences whole body tingling upon waking up which is very brief and has noticed an increase in her shaking. She is currently on no medications for neuropathy, but when she has had other surgeries, she was given gabapentin 300mg and this helped her pain. She has preferred conservative management and has had some acupuncture treatments. She has also tried compounded neuro cream.     She has undergone several surgeries since 2019, with history of hip replacement in 1999, a revision on her right hip was completed, and a right knee  "replacement.     She experiences tingling in her legs and thighs. She also reports leg cramps. She has been diagnosed with fibromyalgia and has been on cyclobenzaprine for 30 years for chronic neck pain. She has noticed a decline in her balance and has been trying to improve it through exercise.    She has a history of right Bell's palsy with blepharospasms and has received Botox injections with  ophthalmology for management.     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 She has been on aspirin previously, 81 mg/day. She has been on Remicade for 20 years and has not had a migraine since 1999. She has been experiencing intermittent vertigo for the past 1.5 years, which she believes is triggered by bright lights. She has not had any migraine symptoms recently.    She is not diabetic.    FAMILY HISTORY  Her mother has a tremor and has gone into \"full blown\" neuropathy.    Prior Neurological history and workup:  Tremors, microangiopathy and neuropathy for years.     EEG 9/2018 for one episode of shaking was normal. No other spells with significant reduction in stress.     She also has peripheral neuropathy moderate axonal BLE with most recent emg/ncvs 9/2018. She has no pain. Numbness and tingling intermittent. Responds well to acupuncture. Prefers conservative therapy.     She does tell me that she continues to have symptoms from the right Bell's palsy with blepharospasm. Seeing Dr. Pino at  ophthalmology and will have cataracts removed 8/2019 and then start Botox injections.     9/22/17 for follow up on right facial bells palsy which originally occurred on 12/6/16 and then slowly resolved on 5/15/17.  She had new symptoms present on 6/1/17 with reoccurrence of the right facial bells palsy. MRI of the brain with and without contrast which was completed on 8/30/17 showing bilateral periventricular white matter changes moderate with no abnormal contrast " enhancement. Showed microangiopathy with history of severe migraines in past and was started on Aspirin 81mg daily. We did check her Lyme disease, angiotensin-converting enzyme, and RPR which were all within normal limits.  She had an MRA of the head without contrast which was unremarkable on 9/13/17 and MRA of the neck with contrast on 9/13/17 which was also unremarkable.      She is followed by Dr. Gerard rheumatology for her RA and gets IV Remicade treatments.     The following portions of the patient's history were reviewed and updated as appropriate: allergies, current medications, past family history, past medical history, past social history, past surgical history, and problem list.    Past Medical History:   Diagnosis Date   • Arthritis    • Bell's palsy    • Hyperlipidemia    • Hypertension    • Neuropathy    • Rheumatoid arthritis        Past Surgical History:   Procedure Laterality Date   • BACK SURGERY     • CHOLECYSTECTOMY     • FOOT SURGERY     • HYSTERECTOMY     • KNEE SURGERY     • TOTAL HIP ARTHROPLASTY         Social History     Socioeconomic History   • Marital status:    Tobacco Use   • Smoking status: Never   • Smokeless tobacco: Never   Vaping Use   • Vaping status: Never Used   • Passive vaping exposure: Yes   Substance and Sexual Activity   • Alcohol use: No   • Drug use: No   • Sexual activity: Defer       Family History   Problem Relation Age of Onset   • Lung cancer Father    • Cancer Maternal Grandmother    • Stroke Maternal Grandfather    • Kidney disease Mother    • Lung disease Mother    • Arrhythmia Mother    • No Known Problems Sister    • Rheum arthritis Paternal Grandmother    • No Known Problems Sister    • No Known Problems Half-Sister    • No Known Problems Daughter    • No Known Problems Son           Current Outpatient Medications:   •  amLODIPine (NORVASC) 5 MG tablet, Take 1 tablet by mouth Daily., Disp: 90 tablet, Rfl: 3  •  azelastine (ASTELIN) 0.1 % nasal spray,  Administer 2 sprays into the nostril(s) as directed by provider 2 (Two) Times a Day. Use in each nostril as directed, Disp: , Rfl:   •  Calcium Carbonate-Vitamin D 600-10 MG-MCG per tablet, Take 1 tablet by mouth Daily., Disp: , Rfl:   •  CeleBREX 200 MG capsule, Take 1 capsule twice a day by oral route., Disp: , Rfl:   •  cyclobenzaprine (FLEXERIL) 10 MG tablet, Take 1 tablet by mouth every night at bedtime., Disp: , Rfl:   •  fluticasone (FLONASE) 50 MCG/ACT nasal spray, Administer 2 sprays into the nostril(s) as directed by provider Daily., Disp: , Rfl:   •  folic acid (FOLVITE) 1 MG tablet, Take 5 tablets by mouth Daily., Disp: , Rfl:   •  inFLIXimab (Remicade) 100 MG injection, 7 mg/kg (500 mg) body weight IV q 8 weeks, Disp: , Rfl:   •  loratadine (CLARITIN REDITABS) 10 MG disintegrating tablet, Place 1 tablet on the tongue Daily., Disp: , Rfl:   •  Magnesium Oxide 400 MG capsule, Every 12 (Twelve) Hours., Disp: , Rfl:   •  methotrexate 2.5 MG tablet, Take 8 tablets by mouth 1 (One) Time Per Week. Taken on Sundays, Disp: , Rfl:   •  montelukast (SINGULAIR) 10 MG tablet, , Disp: , Rfl:   •  omeprazole (priLOSEC) 20 MG capsule, , Disp: , Rfl:   •  onabotulinumtoxina (BOTOX) 100 units reconstituted solution injection, Inject 3 Units into the appropriate muscle as directed by prescriber., Disp: , Rfl:   •  Pancrelipase, Lip-Prot-Amyl, (Zenpep) [id number] units capsule delayed-release particles capsule, Zenpep 40,000 unit-126,000 unit-168,000 unit capsule,delayed release  10 sample boxes, Disp: , Rfl:   •  Psyllium (Metamucil) 0.36 g capsule, Daily., Disp: , Rfl:   •  rosuvastatin (CRESTOR) 5 MG tablet, Take 1 tablet by mouth Daily., Disp: 90 tablet, Rfl: 3  •  tretinoin (RETIN-A) 0.05 % cream, , Disp: , Rfl:   •  gabapentin (NEURONTIN) 100 MG capsule, Take 1-2 capsules 3 times a day, Disp: 180 capsule, Rfl: 5     Review of Systems   Musculoskeletal:  Positive for arthralgias.   Neurological:  Positive for  "numbness.   All other systems reviewed and are negative.       Objective:  /70   Pulse 79   Ht 165.1 cm (65\")   Wt 68.9 kg (152 lb)   SpO2 95%   BMI 25.29 kg/m²     Neurological Exam  Mental Status  Alert. Oriented to person, place, time and situation. Speech is normal. Language is fluent with no aphasia. Attention and concentration are normal. Fund of knowledge is appropriate for level of education.    Cranial Nerves  CN II: Visual acuity is normal. Visual fields full to confrontation.  CN III, IV, VI: Extraocular movements intact bilaterally. Right eye blepharospam intermittent with Botox injections. Normal lids and orbits bilaterally. Pupils equal round and reactive to light bilaterally.  CN V: Facial sensation is normal.  CN VII:  Right: There is central facial weakness. Minimal right facial weakness chronic since prior Bell's Palsy.  CN IX, X: Palate elevates symmetrically. Normal gag reflex.  CN XI: Shoulder shrug strength is normal.  CN XII: Tongue midline without atrophy or fasciculations.    Motor  Normal muscle bulk throughout. No fasciculations present. Normal muscle tone. The following abnormal movements were seen: Mild to moderate BUE fine kinetic hand tremors, no resting tremors, brisk finger and heel taps bilaterally..   Strength is 5/5 throughout all four extremities.    Reports chronic pain due to RA.    Sensory  Light touch abnormality: Pinprick abnormality: Temperature abnormality: Vibration abnormality: Sensation:  All sensation decreased distal 1/3 of bilateral feet, plantar surface, decreased in BLE but increased sensation more intact proximally..     Reflexes                                            Right                      Left  Brachioradialis                    2+                         2+  Biceps                                 2+                         2+  Triceps                                2+                         2+  Patellar                                1+      "                    1+  Achilles                                1+                         1+  Right Plantar: downgoing  Left Plantar: downgoing    Right pathological reflexes: Ankle clonus absent.  Left pathological reflexes: Ankle clonus absent.    Coordination    Finger-to-nose, rapid alternating movements and heel-to-shin normal bilaterally without dysmetria.    Gait  Normal casual, toe, heel and tandem gait. Romberg is absent. Able to rise from chair without using arms.        Physical Exam  Constitutional:       Appearance: Normal appearance.   Eyes:      General: Lids are normal.      Extraocular Movements: Extraocular movements intact.      Pupils: Pupils are equal, round, and reactive to light.   Cardiovascular:      Pulses:           Dorsalis pedis pulses are 2+ on the right side and 2+ on the left side.        Posterior tibial pulses are 2+ on the right side and 2+ on the left side.   Feet:      Right foot:      Skin integrity: Skin integrity normal.      Left foot:      Skin integrity: Skin integrity normal.      Comments:   High arches bilaterally, right dorsal surface of the foot with 1 bony prominence without open skin  Neurological:      Mental Status: She is alert.      Motor: Motor strength is normal.     Coordination: Coordination is intact. Romberg sign negative.      Deep Tendon Reflexes:      Reflex Scores:       Tricep reflexes are 2+ on the right side and 2+ on the left side.       Bicep reflexes are 2+ on the right side and 2+ on the left side.       Brachioradialis reflexes are 2+ on the right side and 2+ on the left side.       Patellar reflexes are 1+ on the right side and 1+ on the left side.       Achilles reflexes are 1+ on the right side and 1+ on the left side.  Psychiatric:         Mood and Affect: Mood is anxious.         Speech: Speech normal.       Results:  Results  Laboratory Studies with PCP 5/2024:  Vitamin D 40.6. Thyroid Stimulating Hormone (TSH) was elevated at 6.35. Total  cholesterol 151, LDL 62.    Imaging  MRI of the brain with and without contrast from 2017 showed microangiopathy. MRI of the brain  with and without contrast from 1/6/2021 and 1/3/2023 was without contrast-all showed chronic small vessel ischemic changes stable over 6 years. I personally reviewed all 3 MRIs with imaging with Alaina. These do show moderate to advanced changes but have remained stable.     Testing  EMG and NCVS completed 9/20/2018 showed moderate axonal bilateral lower extremities.    Assessment/Plan:     Diagnoses and all orders for this visit:    1. Idiopathic peripheral neuropathy (Primary)  -     CK; Future  -     ROLDAN + PE; Future  -     Methylmalonic Acid, Serum; Future  -     Vitamin B12; Future  -     Folate; Future  -     Vitamin B6; Future  -     Paraneoplastic Autoantibody Evalulation; Future  -     gabapentin (NEURONTIN) 100 MG capsule; Take 1-2 capsules 3 times a day  Dispense: 180 capsule; Refill: 5  -     Ambulatory Referral to Physical Therapy for Evaluation & Treatment    2. Tremor  -     gabapentin (NEURONTIN) 100 MG capsule; Take 1-2 capsules 3 times a day  Dispense: 180 capsule; Refill: 5    3. Microangiopathy  Comments:  stable on 3 MRIs.    4. Impairment of balance  -     Ambulatory Referral to Physical Therapy for Evaluation & Treatment           Assessment & Plan  1. Idiopathic neuropathy.  The neuropathy has worsened, with symptoms of numbness and tingling extending above the knees and into the thighs. Gabapentin 100 mg capsules will be initiated with titration: 1 pill nightly for 4 days, 1 pill twice a day for 4 days, then 1 pill three times a day. If not improving in 1 week, may increase to 2 pills three times a day. If not better in 3 to 4 weeks, she will contact us for an increase in dosing. A referral to physical therapy for balance issues has been made. Labs including B12 and electrolytes will be checked during her Remicade infusion next week. She is signed under a  controlled substance agreement.    2. Chronic small vessel ischemic changes.  MRI of the brain from 2017, 2021, and 03/2023 shows stable chronic small vessel ischemic changes over 6 years. No new concerns were noted. Primary stroke prevention. Pending approval from her rheumatologist, she may start aspirin 81 mg daily for primary stroke prevention. She will be notified if it is okay to restart aspirin.    3. Rheumatoid arthritis.  She continues to follow with rheumatology and receives Remicade infusions. No changes in her current management plan.    4. Bell's palsy.  She continues to receive Botox injections for blepharospasms, with the last injection administered 2 weeks ago.    Follow-up  She will follow up in 02/2025 and then 6 months thereafter.    Reviewed medications, potential side effects and signs and symptoms to report. Discussed risk versus benefits of treatment plan with patient and/or family-including medications, labs and radiology that may be ordered. Addressed questions and concerns during visit. Patient and/or family verbalized understanding and agree with plan.    As part of this patient's treatment plan I am prescribing controlled substances. The patient has been made aware of appropriate use of such medications, including potential risk of somnolence, limited ability to drive and/or work safely, and potential for dependence or overdose. It has also been made clear that these medications are for use by the patient only, without concomitant use of alcohol or other substances unless prescribed. Keep out of reach of children.  Wai report has been reviewed. If this is going to be prescribed long term, [facility] [place] Contract has also been read and signed by patient and myself.    During this visit the following were done:  Labs Reviewed [x]    Labs Ordered [x]    Radiology Reports Reviewed [x]    Radiology Ordered []    PCP Records Reviewed []    Referring Provider Records  Reviewed []    ER Records Reviewed []    Hospital Records Reviewed []    History Obtained From Family []    Radiology Images Reviewed [x]    Other Reviewed []    Records Requested []      10/18/24   11:14 EDT    Patient or patient representative verbalized consent for the use of Ambient Listening during the visit with  BJ Lyman for chart documentation. 10/18/2024  13:56 EDT    Note to patient: The 21st Century Cures Act makes medical notes like these available to patients in the interest of transparency. However, be advised this is a medical document. It is intended as peer to peer communication. It is written in medical language and may contain abbreviations or verbiage that are unfamiliar. It may appear blunt or direct. Medical documents are intended to carry relevant information, facts as evident, and the clinical opinion of the provider.

## 2024-10-18 NOTE — PROGRESS NOTES
Subjective:     Patient ID: Alaina Kennedy is a 69 y.o. female.    CC:   Chief Complaint   Patient presents with    Peripheral Neuropathy     Both legs and feet       HPI:   History of Present Illness  This is a 69-[facility]    She was last seen in our clinic on 07/18/2019 for mild bilateral tremors with a family history of tremor, known peripheral neuropathy, and moderate axonal bilateral lower extremities on EMG and NCVS completed on 09/20/2018. She has followed with rheumatology for rheumatoid arthritis and [facility]. She was referred back to our clinic by her primary care provider for treatment of her polyneuropathy. Neuropathy symptoms began in 2004 and have worsened.     She reports worsening neuropathy, characterized by constant numbness and tingling in her feet which goes up into bilateral thighs. She experiences whole body tingling upon waking up which is very brief and has noticed an increase in her shaking. She is currently on no medications for neuropathy, but when she has had other surgeries, she was given gabapentin 300mg and this helped her pain. She has preferred conservative management and has had some acupuncture treatments. She has also tried compounded neuro cream.     She has undergone several surgeries since 2019, with history of hip replacement in 1999, a revision on her right hip was completed, and a right knee replacement.     She experiences tingling in her legs and thighs. She also reports leg cramps. She has been diagnosed with fibromyalgia and has been on cyclobenzaprine for 30 years for chronic neck pain. She has noticed a decline in her balance and has been trying to improve it through exercise.    She has a history of right Bell's palsy with blepharospasms and has received Botox injections with  ophthalmology for management.     She has a history of severe  "migraines, and a prior MRI of the brain from 2017 showed microangiopathy. She has been on aspirin previously, 81 mg/day. She has been on Remicade for 20 years and has not had a migraine since 1999. She has been experiencing intermittent vertigo for the past 1.5 years, which she believes is triggered by bright lights. She has not had any migraine symptoms recently.    She is not diabetic.    FAMILY HISTORY  Her mother has a tremor and has gone into \"full blown\" neuropathy.    Prior Neurological history and workup:  Tremors, microangiopathy and neuropathy for years.     EEG 9/2018 for one episode of shaking was normal. No other spells with significant reduction in stress.     She also has peripheral neuropathy moderate axonal BLE with most recent emg/ncvs 9/2018. She has no pain. Numbness and tingling intermittent. Responds well to acupuncture. Prefers conservative therapy.     She does tell me that she continues to have symptoms from the right Bell's palsy with blepharospasm. Seeing Dr. Pino at  ophthalmology and will have cataracts removed 8/2019 and then start Botox injections.     9/22/17 for follow up on right facial bells palsy which originally occurred on 12/6/16 and then slowly resolved on 5/15/17.  She had new symptoms present on 6/1/17 with reoccurrence of the right facial bells palsy. MRI of the brain with and without contrast which was completed on 8/30/17 showing bilateral periventricular white matter changes moderate with no abnormal contrast enhancement. Showed microangiopathy with history of severe migraines in past and was started on Aspirin 81mg daily. We did check her Lyme disease, angiotensin-converting enzyme, and RPR which were all within normal limits.  She had an MRA of the head without contrast which was unremarkable on 9/13/17 and MRA of the neck with contrast on 9/13/17 which was also unremarkable.      She is followed by Dr. Gerard rheumatology for her RA and gets IV Remicade treatments. "     The following portions of the patient's history were reviewed and updated as appropriate: allergies, current medications, past family history, past medical history, past social history, past surgical history, and problem list.    Past Medical History:   Diagnosis Date    Arthritis     Bell's palsy     Hyperlipidemia     Hypertension     Neuropathy     Rheumatoid arthritis        Past Surgical History:   Procedure Laterality Date    BACK SURGERY      CHOLECYSTECTOMY      FOOT SURGERY      HYSTERECTOMY      KNEE SURGERY      TOTAL HIP ARTHROPLASTY         Social History     Socioeconomic History    Marital status:    Tobacco Use    Smoking status: Never    Smokeless tobacco: Never   Vaping Use    Vaping status: Never Used    Passive vaping exposure: Yes   Substance and Sexual Activity    Alcohol use: No    Drug use: No    Sexual activity: Defer       Family History   Problem Relation Age of Onset    Lung cancer Father     Cancer Maternal Grandmother     Stroke Maternal Grandfather     Kidney disease Mother     Lung disease Mother     Arrhythmia Mother     No Known Problems Sister     Rheum arthritis Paternal Grandmother     No Known Problems Sister     No Known Problems Half-Sister     No Known Problems Daughter     No Known Problems Son           Current Outpatient Medications:     amLODIPine (NORVASC) 5 MG tablet, Take 1 tablet by mouth Daily., Disp: 90 tablet, Rfl: 3    azelastine (ASTELIN) 0.1 % nasal spray, Administer 2 sprays into the nostril(s) as directed by provider 2 (Two) Times a Day. Use in each nostril as directed, Disp: , Rfl:     Calcium Carbonate-Vitamin D 600-10 MG-MCG per tablet, Take 1 tablet by mouth Daily., Disp: , Rfl:     CeleBREX 200 MG capsule, Take 1 capsule twice a day by oral route., Disp: , Rfl:     cyclobenzaprine (FLEXERIL) 10 MG tablet, Take 1 tablet by mouth every night at bedtime., Disp: , Rfl:     fluticasone (FLONASE) 50 MCG/ACT nasal spray, Administer 2 sprays into the  "nostril(s) as directed by provider Daily., Disp: , Rfl:     folic acid (FOLVITE) 1 MG tablet, Take 5 tablets by mouth Daily., Disp: , Rfl:     inFLIXimab (Remicade) 100 MG injection, 7 mg/kg (500 mg) body weight IV q 8 weeks, Disp: , Rfl:     loratadine (CLARITIN REDITABS) 10 MG disintegrating tablet, Place 1 tablet on the tongue Daily., Disp: , Rfl:     Magnesium Oxide 400 MG capsule, Every 12 (Twelve) Hours., Disp: , Rfl:     methotrexate 2.5 MG tablet, Take 8 tablets by mouth 1 (One) Time Per Week. Taken on Sundays, Disp: , Rfl:     montelukast (SINGULAIR) 10 MG tablet, , Disp: , Rfl:     omeprazole (priLOSEC) 20 MG capsule, , Disp: , Rfl:     onabotulinumtoxina (BOTOX) 100 units reconstituted solution injection, Inject 3 Units into the appropriate muscle as directed by prescriber., Disp: , Rfl:     Pancrelipase, Lip-Prot-Amyl, (Zenpep) 10152-287427 units capsule delayed-release particles capsule, Zenpep 40,000 unit-126,000 unit-168,000 unit capsule,delayed release  10 sample boxes, Disp: , Rfl:     Psyllium (Metamucil) 0.36 g capsule, Daily., Disp: , Rfl:     rosuvastatin (CRESTOR) 5 MG tablet, Take 1 tablet by mouth Daily., Disp: 90 tablet, Rfl: 3    tretinoin (RETIN-A) 0.05 % cream, , Disp: , Rfl:     gabapentin (NEURONTIN) 100 MG capsule, Take 1-2 capsules 3 times a day, Disp: 180 capsule, Rfl: 5     Review of Systems   Musculoskeletal:  Positive for arthralgias.   Neurological:  Positive for numbness.   All other systems reviewed and are negative.       Objective:  /70   Pulse 79   Ht 165.1 cm (65\")   Wt 68.9 kg (152 lb)   SpO2 95%   BMI 25.29 kg/m²     Neurological Exam  Mental Status  Alert. Oriented to person, place, time and situation. Speech is normal. Language is fluent with no aphasia. Attention and concentration are normal. Fund of knowledge is appropriate for level of education.    Cranial Nerves  CN II: Visual acuity is normal. Visual fields full to confrontation.  CN III, IV, VI: " Extraocular movements intact bilaterally. Right eye blepharospam intermittent with Botox injections. Normal lids and orbits bilaterally. Pupils equal round and reactive to light bilaterally.  CN V: Facial sensation is normal.  CN VII:  Right: There is central facial weakness. Minimal right facial weakness chronic since prior Bell's Palsy.  CN IX, X: Palate elevates symmetrically. Normal gag reflex.  CN XI: Shoulder shrug strength is normal.  CN XII: Tongue midline without atrophy or fasciculations.    Motor  Normal muscle bulk throughout. No fasciculations present. Normal muscle tone. The following abnormal movements were seen: Mild to moderate BUE fine kinetic hand tremors, no resting tremors, brisk finger and heel taps bilaterally..   Strength is 5/5 throughout all four extremities.    Reports chronic pain due to RA.    Sensory  Light touch abnormality: Pinprick abnormality: Temperature abnormality: Vibration abnormality: Sensation:  All sensation decreased distal 1/3 of bilateral feet, plantar surface, decreased in BLE but increased sensation more intact proximally..     Reflexes                                            Right                      Left  Brachioradialis                    2+                         2+  Biceps                                 2+                         2+  Triceps                                2+                         2+  Patellar                                1+                         1+  Achilles                                1+                         1+  Right Plantar: downgoing  Left Plantar: downgoing    Right pathological reflexes: Ankle clonus absent.  Left pathological reflexes: Ankle clonus absent.    Coordination    Finger-to-nose, rapid alternating movements and heel-to-shin normal bilaterally without dysmetria.    Gait  Normal casual, toe, heel and tandem gait. Romberg is absent. Able to rise from chair without using arms.        Physical Exam  Constitutional:        Appearance: Normal appearance.   Eyes:      General: Lids are normal.      Extraocular Movements: Extraocular movements intact.      Pupils: Pupils are equal, round, and reactive to light.   Cardiovascular:      Pulses:           Dorsalis pedis pulses are 2+ on the right side and 2+ on the left side.        Posterior tibial pulses are 2+ on the right side and 2+ on the left side.   Feet:      Right foot:      Skin integrity: Skin integrity normal.      Left foot:      Skin integrity: Skin integrity normal.      Comments:   High arches bilaterally, right dorsal surface of the foot with 1 bony prominence without open skin  Neurological:      Mental Status: She is alert.      Motor: Motor strength is normal.     Coordination: Coordination is intact. Romberg sign negative.      Deep Tendon Reflexes:      Reflex Scores:       Tricep reflexes are 2+ on the right side and 2+ on the left side.       Bicep reflexes are 2+ on the right side and 2+ on the left side.       Brachioradialis reflexes are 2+ on the right side and 2+ on the left side.       Patellar reflexes are 1+ on the right side and 1+ on the left side.       Achilles reflexes are 1+ on the right side and 1+ on the left side.  Psychiatric:         Mood and Affect: Mood is anxious.         Speech: Speech normal.       Results:  Results  Laboratory Studies with PCP 5/2024:  Vitamin D 40.6. Thyroid Stimulating Hormone (TSH) was elevated at 6.35. Total cholesterol 151, LDL 62.    Imaging  MRI of the brain with and without contrast from 2017 showed microangiopathy. MRI of the brain  with and without contrast from 1/6/2021 and 1/3/2023 was without contrast-all showed chronic small vessel ischemic changes stable over 6 years. I personally reviewed all 3 MRIs with imaging with Alaina. These do show moderate to advanced changes but have remained stable.     Testing  EMG and NCVS completed 9/20/2018 showed moderate axonal bilateral lower  extremities.    Assessment/Plan:     Diagnoses and all orders for this visit:    1. Idiopathic peripheral neuropathy (Primary)  -     CK; Future  -     ROLDAN + PE; Future  -     Methylmalonic Acid, Serum; Future  -     Vitamin B12; Future  -     Folate; Future  -     Vitamin B6; Future  -     Paraneoplastic Autoantibody Evalulation; Future  -     gabapentin (NEURONTIN) 100 MG capsule; Take 1-2 capsules 3 times a day  Dispense: 180 capsule; Refill: 5  -     Ambulatory Referral to Physical Therapy for Evaluation & Treatment    2. Tremor  -     gabapentin (NEURONTIN) 100 MG capsule; Take 1-2 capsules 3 times a day  Dispense: 180 capsule; Refill: 5    3. Microangiopathy  Comments:  stable on 3 MRIs.    4. Impairment of balance  -     Ambulatory Referral to Physical Therapy for Evaluation & Treatment           Assessment & Plan  1. Idiopathic neuropathy.  The neuropathy has worsened, with symptoms of numbness and tingling extending above the knees and into the thighs. Gabapentin 100 mg capsules will be initiated with titration: 1 pill nightly for 4 days, 1 pill twice a day for 4 days, then 1 pill three times a day. If not improving in 1 week, may increase to 2 pills three times a day. If not better in 3 to 4 weeks, she will contact us for an increase in dosing. A referral to physical therapy for balance issues has been made. Labs including B12 and electrolytes will be checked during her Remicade infusion next week. She is signed under a controlled substance agreement.    2. Chronic small vessel ischemic changes.  MRI of the brain from 2017, 2021, and 03/2023 shows stable chronic small vessel ischemic changes over 6 years. No new concerns were noted. Primary stroke prevention. Pending approval from her rheumatologist, she may start aspirin 81 mg daily for primary stroke prevention. She will be notified if it is okay to restart aspirin.    3. Rheumatoid arthritis.  She continues to follow with rheumatology and receives  Remicade infusions. No changes in her current management plan.    4. Bell's palsy.  She continues to receive Botox injections for blepharospasms, with the last injection administered 2 weeks ago.    Follow-up  She will follow up in 02/2025 and then 6 months thereafter.    Reviewed medications, potential side effects and signs and symptoms to report. Discussed risk versus benefits of treatment plan with patient and/or family-including medications, labs and radiology that may be ordered. Addressed questions and concerns during visit. Patient and/or family verbalized understanding and agree with plan.    As part of this patient's treatment plan I am prescribing controlled substances. The patient has been made aware of appropriate use of such medications, including potential risk of somnolence, limited ability to drive and/or work safely, and potential for dependence or overdose. It has also been made clear that these medications are for use by the patient only, without concomitant use of alcohol or other substances unless prescribed. Keep out of reach of children.  Wai report has been reviewed. If this is going to be prescribed long term, [facility] Controlled Substance Agreement Contract has also been read and signed by patient and myself.    During this visit the following were done:  Labs Reviewed [x]    Labs Ordered [x]    Radiology Reports Reviewed [x]    Radiology Ordered []    PCP Records Reviewed []    Referring Provider Records Reviewed []    ER Records Reviewed []    Hospital Records Reviewed []    History Obtained From Family []    Radiology Images Reviewed [x]    Other Reviewed []    Records Requested []      10/18/24   11:14 EDT    Patient or patient representative verbalized consent for the use of Ambient Listening during the visit with  BJ Lyman for chart documentation. 10/18/2024  13:56 EDT    Note to patient: The 21st Century Cures Act makes medical notes like these available to patients  in the interest of transparency. However, be advised this is a medical document. It is intended as peer to peer communication. It is written in medical language and may contain abbreviations or verbiage that are unfamiliar. It may appear blunt or direct. Medical documents are intended to carry relevant information, facts as evident, and the clinical opinion of the provider.

## 2024-10-28 RX ORDER — ASPIRIN 81 MG/1
81 TABLET, CHEWABLE ORAL DAILY
Qty: 90 TABLET | Refills: 3 | Status: SHIPPED | OUTPATIENT
Start: 2024-10-28 | End: 2025-10-28

## 2024-10-28 NOTE — TELEPHONE ENCOUNTER
Pt sent a my chart message wanting to know if our office was able to speak with Rheumatology about the addition of the Baby Aspirin and I let her know we had tried twice and left two messages with  for a return call but have not heard from them yet.

## 2024-10-28 NOTE — TELEPHONE ENCOUNTER
Please let Alaina know that Rheumatology BJ Allen at [facility] gave the OK for Aspirin 81mg chewable once a day. I sent a script to her pharmacy, but she may have to get over the counter. Thanks, Sera

## 2024-10-31 ENCOUNTER — TELEPHONE (OUTPATIENT)
Dept: NEUROLOGY | Facility: CLINIC | Age: 69
End: 2024-10-31
Payer: MEDICARE

## 2024-10-31 NOTE — TELEPHONE ENCOUNTER
Provider: TIARA CANELA    Caller: LESIA CHAMBERS WITH [facility]    Phone Number: [phone]    Reason for Call: NEEDS ADDITIONAL DIAGNOSIS CODES FOR LABS. STATE THE DX CODE FOR POLYNEUROPATHY DOESN'T COVER THE LABS & WOULD LIKE TO SEE IF THERE IS ANY DX CODES FOR SOMETHING LIKE ANEMIA OR VITAMIN B DEFICIENCY, WHICH WOULD COVER THEM. PLEASE REVIEW & ADVISE, THANK YOU.

## 2024-10-31 NOTE — TELEPHONE ENCOUNTER
Tremor  Microangiopathy  Numbness and tingling    Diagnosis codes:    G60.9  R25.1  I73.9  R26.89  R20.0

## 2024-10-31 NOTE — TELEPHONE ENCOUNTER
"Relay     \"I called and left a detailed message with Dariana from [facility] and gave her all the codes suggested by Provider Sera LORENZO\"                "

## 2024-11-01 ENCOUNTER — HOSPITAL ENCOUNTER (OUTPATIENT)
Dept: PHYSICAL THERAPY | Facility: HOSPITAL | Age: 69
Setting detail: THERAPIES SERIES
Discharge: HOME | End: 2024-11-01
Payer: MEDICARE

## 2024-11-01 DIAGNOSIS — G62.9 NEUROPATHY: Primary | ICD-10-CM

## 2024-11-01 PROCEDURE — 97110 THERAPEUTIC EXERCISES: CPT | Performed by: PHYSICAL THERAPIST

## 2024-11-01 PROCEDURE — 97162 PT EVAL MOD COMPLEX 30 MIN: CPT | Performed by: PHYSICAL THERAPIST

## 2024-11-01 NOTE — THERAPY EVALUATION
Physical Therapy Initial Evaluation and Plan of Care     [facility]          [address]     Patient: Alaina Kennedy   : 1955  MRN: [id number]   Diagnosis/ICD-10 Code:      ICD-10-CM ICD-9-CM   1. Neuropathy  G62.9 355.9      Referring practitioner: LORI Lyman*  Today's Date: 2024    Subjective:     Subjective Questionnaire: MONTILLA 44/56  FGA   10 Meter 9.74 sec      Subjective Evaluation    History of Present Illness  Mechanism of injury: Patient reports she has had neuropathy for 20 years. She also has RA with multiple joint replacements. She has a growth on her foot that causes her foot to not bend. She cannot have surgery on the foot. She has had a few falls. She weaves when she walks. Standing still is challenging too. Pt is replacing L knee in February.       Patient Occupation: retired Pain  Current pain ratin  At best pain ratin  At worst pain ratin  Location: global    Social Support  Lives in: condominium  Lives with: spouse       Objective          Strength/Myotome Testing     Left Hip   Planes of Motion   Flexion: 5  Abduction: 4    Right Hip   Planes of Motion   Flexion: 4+  Abduction: 4    Left Knee   Flexion: 4+  Extension: 4+    Right Knee   Flexion: 5  Extension: 5    Left Ankle/Foot   Dorsiflexion: 3-    Right Ankle/Foot   Dorsiflexion: 5    Ambulation     Comments   LLD R<L   LLE toe out while walking         PT Neuro         Assessment & Plan       Assessment  Impairments: abnormal coordination, abnormal gait, activity intolerance, impaired balance, impaired physical strength and safety issue   Functional limitations: carrying objects, walking, standing and stooping   Assessment details: Patient is a 69 YOF with an extensive PMH for RA and joint replacment surgeries. Patient has had neuroapthy for 20 years and she is beginning to notice changes in her balance. She has a notable LLD that is causing altered weight shift  to her RLE, along with hip and pelvic weakness. Patient will require skilled PT intervention to address deficits in order to improve global mobility and function.   Prognosis: fair    Goals  Plan Goals: STG (4 visits)  1. Patient will report compliance with initial HEP.   2. Patient to improve MONTILLA balance score to >/= 48 /56 to decrease patient's risk of falls.  3. Patient to ambulate 10 meters without AD within 8 sec without LOB for improved gait erik and functional mobility.  4. Patient to improve FGA score to >/= 22 /30 to decrease patient's risk of falls and improve community ambulation.  .    LTG (8 visits)  1. Patient will be I with final HEP.   2. Patient to improve MONTILLA balance score to >/= 52 /56 to decrease patient's risk of falls.  3. Patient to ambulate 10 meters without AD within 7 sec without LOB for improved gait erik and functional mobility.  4. Patient to improve FGA score to >/= 25 /30 to decrease patient's risk of falls and improve community ambulation.          Plan  Therapy options: will be seen for skilled therapy services  Planned modality interventions: TENS  Planned therapy interventions: ADL retraining, balance/weight-bearing training, flexibility, gait training, manual therapy, neuromuscular re-education, motor coordination training, postural training, strengthening, stretching, therapeutic activities, transfer training and home exercise program  Frequency: 1x week  Duration in visits: 6  Treatment plan discussed with: patient  Plan details: Patient will be seen 1x/wk x 6 wks with treatment to include strengthening, stretching, manual therapy, neuromuscular re-education, balance, gait and endurance training.               PT SIGNATURE: Celeste Roper, PT, DPT, MSCS, CSRS  [place] License #[id number]  DATE TREATMENT INITIATED: 11/1/2024      Medicare Initial Certification Certification Period: 11/1/2024thru1/29/2025  I certify that the therapy services are furnished while this patient is  under my care.  The services outlined above are required by this patient, and will be reviewed every 90 days.     PHYSICIAN: Sera Macedo APRN  NPI: [id number]                                         DATE:     Please sign and return via fax to [phone].   Thank you,   [facility].

## 2024-11-07 ENCOUNTER — APPOINTMENT (OUTPATIENT)
Dept: PHYSICAL THERAPY | Facility: HOSPITAL | Age: 69
End: 2024-11-07
Payer: MEDICARE

## 2024-11-11 ENCOUNTER — TELEPHONE (OUTPATIENT)
Dept: NEUROLOGY | Facility: CLINIC | Age: 69
End: 2024-11-11
Payer: MEDICARE

## 2024-11-11 NOTE — TELEPHONE ENCOUNTER
----- Message from Sera aMcedo sent at 11/8/2024  4:15 PM EST -----        ----- Message -----  From: Aissatou Nunn  Sent: 11/8/2024   1:51 PM EST  To: BJ Lyman    Outside labs

## 2024-11-14 ENCOUNTER — HOSPITAL ENCOUNTER (OUTPATIENT)
Dept: PHYSICAL THERAPY | Facility: HOSPITAL | Age: 69
Setting detail: THERAPIES SERIES
Discharge: HOME | End: 2024-11-14
Payer: MEDICARE

## 2024-11-14 DIAGNOSIS — G62.9 NEUROPATHY: Primary | ICD-10-CM

## 2024-11-14 PROCEDURE — 97116 GAIT TRAINING THERAPY: CPT | Performed by: PHYSICAL THERAPIST

## 2024-11-14 PROCEDURE — 97110 THERAPEUTIC EXERCISES: CPT | Performed by: PHYSICAL THERAPIST

## 2024-11-14 NOTE — THERAPY TREATMENT NOTE
Physical Therapy Daily Note      Patient: Alaina Kennedy   : 1955  MRN: [id number]   Diagnosis/ICD-10 Code:      ICD-10-CM ICD-9-CM   1. Neuropathy  G62.9 355.9      Referring practitioner: LORI Lyman*  Today's Date: 2024    Subjective:   Patient reports: she is having some major increases in bilateral foot pain.   Pain: 7/10-B feet   Treatment has included: therapeutic exercise and gait training    Objective   See Exercise, Manual, and Modality Logs for complete treatment.    PT Neuro          Assessment & Plan       Assessment  Assessment details: Patient reports to clinic with worsening of R anterior foot pain and L lateral foot pain. Her changes in medication she believes has caused her to be unable to sleep for the last couple of weeks. Heel lift was added to R shoe to assist with small LLD. She was instructed to remove if she feels it is not helpful. She [facility] the name of [facility]. She was given the number since she would like to request a second opinion. Patient needs medical intervention with orthopedics, PT is not warranted at this time.     Plan  Plan details: Follow up if needed.               Celeste Roper, PT, DPT, MSCS, CSRS  [place] License #: [id number]  Physical Therapist

## 2025-02-18 ENCOUNTER — OFFICE VISIT (OUTPATIENT)
Dept: NEUROLOGY | Facility: CLINIC | Age: 70
End: 2025-02-18
Payer: MEDICARE

## 2025-02-18 VITALS
OXYGEN SATURATION: 99 % | HEIGHT: 64 IN | SYSTOLIC BLOOD PRESSURE: 140 MMHG | BODY MASS INDEX: 26.19 KG/M2 | DIASTOLIC BLOOD PRESSURE: 82 MMHG | HEART RATE: 72 BPM | WEIGHT: 153.4 LBS

## 2025-02-18 DIAGNOSIS — R25.1 TREMOR: ICD-10-CM

## 2025-02-18 DIAGNOSIS — G60.9 IDIOPATHIC PERIPHERAL NEUROPATHY: ICD-10-CM

## 2025-02-18 DIAGNOSIS — I73.9 MICROANGIOPATHY: Primary | ICD-10-CM

## 2025-02-18 PROCEDURE — 3079F DIAST BP 80-89 MM HG: CPT | Performed by: NURSE PRACTITIONER

## 2025-02-18 PROCEDURE — 1160F RVW MEDS BY RX/DR IN RCRD: CPT | Performed by: NURSE PRACTITIONER

## 2025-02-18 PROCEDURE — 3077F SYST BP >= 140 MM HG: CPT | Performed by: NURSE PRACTITIONER

## 2025-02-18 PROCEDURE — 1159F MED LIST DOCD IN RCRD: CPT | Performed by: NURSE PRACTITIONER

## 2025-02-18 PROCEDURE — 99214 OFFICE O/P EST MOD 30 MIN: CPT | Performed by: NURSE PRACTITIONER

## 2025-02-18 RX ORDER — GABAPENTIN 100 MG/1
CAPSULE ORAL
Qty: 540 CAPSULE | Refills: 1 | Status: SHIPPED | OUTPATIENT
Start: 2025-02-18

## 2025-02-18 NOTE — PROGRESS NOTES
Subjective:     Patient ID: Alaina Kennedy is a 69 y.o. female.    CC:   Chief Complaint   Patient presents with    Follow-up     neuropathy    Peripheral Neuropathy    Tremors       HPI:   History of Present Illness  This is a 69-year-old female who was previously seen in the clinic on 10/18/2024.     She has been followed for tremors with familial tremor, known idiopathic peripheral neuropathy, and is under the care of rheumatology.     She also sees  Ophthalmology for a history of right Bell's palsy with blepharospasms and receives Botox injections by Dr. Darryl Pino, with the most recent injection on 01/29/2025.     At her last visit to the clinic, additional labs were completed to rule out any other etiology of her idiopathic peripheral neuropathy as recommended by the American Academy of Neurology guidelines for neuropathy with no known risk factors. Gabapentin was prescribed for her tremors and neuropathy symptoms, and she was referred to physical therapy. 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 It was recommended that she take aspirin 81 mg daily, and her rheumatology provider, BJ Allen at [facility], approved this regimen in conjunction with her other medications. She is here for follow-up and refills on her medicines today.    She reports an improvement in her neuropathy symptoms, attributing this to the efficacy of gabapentin. Her current regimen includes one dose upon waking, two doses around 2:00 PM, and two doses at bedtime. She describes a sensation of regained feeling in her feet. However, she experiences urinary dribbling, which she believes started with the initiation of gabapentin. Despite this, she reports no difficulty in fully emptying her bladder, it just takes longer when she urinates. She has not had any recent urinary tract infections (UTIs).    She continues to experience persistent leg cramps, which have  been severe enough to disrupt her sleep. She is currently taking magnesium 400 mg daily for irritable bowel syndrome (IBS) and is considering discussing an increase in this dosage with Dr. Youngblood, her Gastroenterologist, during her upcoming appointment next month.    She has discontinued Flexeril, which she found beneficial for sleep. She used Flexeril primarily for neck pain but reports managing well without it currently.    She has noticed a decrease in mobility in one of her feet, which she attributes potentially to her left knee issue. She has a history of knee surgery on the right side and plans for left knee replacement soon.    She reports increased bleeding and dryness of her nose with the daily aspirin along with increased bruising.    Supplemental Information  She had an IV of Remicade yesterday, which caused fatigue all day. She follows up with her cardiologist at [facility].    FAMILY HISTORY  Her mother had significant tremors and neuropathy.    Prior Neurological history and workup:  Tremors, microangiopathy and neuropathy for years.     She has preferred conservative management and has had some acupuncture treatments. She has also tried compounded neuro cream.      EEG 9/2018 for one episode of shaking was normal. No other spells with significant reduction in stress.     She also has peripheral neuropathy moderate axonal BLE with most recent emg/ncvs 9/2018. She has no pain. Numbness and tingling intermittent. Responds well to acupuncture. Prefers conservative therapy.     She does tell me that she continues to have symptoms from the right Bell's palsy with blepharospasm. Seeing Dr. Pino at  ophthalmology and will have cataracts removed 8/2019 and then start Botox injections.      9/22/17 for follow up on right facial bells palsy which originally occurred on 12/6/16 and then slowly resolved on 5/15/17.  She had new symptoms present on 6/1/17 with reoccurrence of the right facial bells palsy.  MRI of the brain with and without contrast which was completed on 8/30/17 showing bilateral periventricular white matter changes moderate with no abnormal contrast enhancement. Showed microangiopathy with history of severe migraines in past and was started on Aspirin 81mg daily. We did check her Lyme disease, angiotensin-converting enzyme, and RPR which were all within normal limits.  She had an MRA of the head without contrast which was unremarkable on 9/13/17 and MRA of the neck with contrast on 9/13/17 which was also unremarkable.      She is followed by Rheumatology for her RA and gets IV Remicade treatments.     She has a history of right Bell's palsy with blepharospasms and has received Botox injections with  ophthalmology for management.      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 She has been on aspirin previously, 81 mg/day. She has been on Remicade for 20 years and has not had a migraine since 1999. She has been experiencing intermittent vertigo for the past 1.5 years, which she believes is triggered by bright lights.     Laboratory Studies with PCP 5/2024:  Vitamin D 40.6. Thyroid Stimulating Hormone (TSH) was elevated at 6.35. Total cholesterol 151, LDL 62.     Imaging  MRI of the brain with and without contrast from 2017 showed microangiopathy. MRI of the brain  with and without contrast from 1/6/2021 and 1/3/2023 was without contrast-all showed chronic small vessel ischemic changes stable over 6 years. I personally reviewed all 3 MRIs with imaging with Alaina. These do show moderate to advanced changes but have remained stable.      Testing  EMG and NCVS completed 9/20/2018 showed moderate axonal bilateral lower extremities.    Mother with neuropathy and tremors.     She has been diagnosed with fibromyalgia and has been on cyclobenzaprine for 30 years for chronic neck pain. Weaned off Fall 2024.    The following portions of the patient's  history were reviewed and updated as appropriate: allergies, current medications, past family history, past medical history, past social history, past surgical history, and problem list.    Past Medical History:   Diagnosis Date    Arthritis     Bell's palsy     Hyperlipidemia     Hypertension     Neuropathy     Rheumatoid arthritis        Past Surgical History:   Procedure Laterality Date    BACK SURGERY      CHOLECYSTECTOMY      FOOT SURGERY      HYSTERECTOMY      KNEE SURGERY      TOTAL HIP ARTHROPLASTY         Social History     Socioeconomic History    Marital status:    Tobacco Use    Smoking status: Never    Smokeless tobacco: Never   Vaping Use    Vaping status: Never Used    Passive vaping exposure: Yes   Substance and Sexual Activity    Alcohol use: No    Drug use: No    Sexual activity: Defer       Family History   Problem Relation Age of Onset    Lung cancer Father     Cancer Maternal Grandmother     Stroke Maternal Grandfather     Kidney disease Mother     Lung disease Mother     Arrhythmia Mother     No Known Problems Sister     Rheum arthritis Paternal Grandmother     No Known Problems Sister     No Known Problems Half-Sister     No Known Problems Daughter     No Known Problems Son           Current Outpatient Medications:     amLODIPine (NORVASC) 5 MG tablet, Take 1 tablet by mouth Daily., Disp: 90 tablet, Rfl: 3    aspirin (Aspirin Childrens) 81 MG chewable tablet, Chew 1 tablet Daily., Disp: 90 tablet, Rfl: 3    azelastine (ASTELIN) 0.1 % nasal spray, Administer 2 sprays into the nostril(s) as directed by provider 2 (Two) Times a Day. Use in each nostril as directed, Disp: , Rfl:     Calcium Carbonate-Vitamin D 600-10 MG-MCG per tablet, Take 2 tablets by mouth Daily., Disp: , Rfl:     CeleBREX 200 MG capsule, Take 1 capsule by mouth Daily., Disp: , Rfl:     fluticasone (FLONASE) 50 MCG/ACT nasal spray, Administer 2 sprays into the nostril(s) as directed by provider Daily., Disp: , Rfl:      "folic acid (FOLVITE) 1 MG tablet, Take 5 tablets by mouth Daily., Disp: , Rfl:     gabapentin (NEURONTIN) 100 MG capsule, Take 1-2 capsules 3 times a day, Disp: 540 capsule, Rfl: 1    inFLIXimab (Remicade) 100 MG injection, 7 mg/kg (500 mg) body weight IV q 8 weeks, Disp: , Rfl:     loratadine (CLARITIN REDITABS) 10 MG disintegrating tablet, Place 1 tablet on the tongue Daily., Disp: , Rfl:     Magnesium Oxide 400 MG capsule, Every 12 (Twelve) Hours., Disp: , Rfl:     methotrexate 2.5 MG tablet, Take 8 tablets by mouth 1 (One) Time Per Week. Taken on Sundays, Disp: , Rfl:     montelukast (SINGULAIR) 10 MG tablet, , Disp: , Rfl:     omeprazole (priLOSEC) 20 MG capsule, , Disp: , Rfl:     onabotulinumtoxina (BOTOX) 100 units reconstituted solution injection, Inject 3 Units into the appropriate muscle as directed by prescriber., Disp: , Rfl:     Pancrelipase, Lip-Prot-Amyl, (Zenpep) [id number] units capsule delayed-release particles capsule, Zenpep 40,000 unit-126,000 unit-168,000 unit capsule,delayed release  10 sample boxes, Disp: , Rfl:     Psyllium (Metamucil) 0.36 g capsule, Daily., Disp: , Rfl:     rosuvastatin (CRESTOR) 5 MG tablet, Take 1 tablet by mouth Daily., Disp: 90 tablet, Rfl: 3    tretinoin (RETIN-A) 0.05 % cream, , Disp: , Rfl:      Review of Systems   Musculoskeletal:  Positive for arthralgias and gait problem.   Neurological:  Positive for tremors and numbness.   All other systems reviewed and are negative.       Objective:  /82 (BP Location: Left arm, Patient Position: Sitting)   Pulse 72   Ht 162.6 cm (64\")   Wt 69.6 kg (153 lb 6.4 oz)   SpO2 99%   BMI 26.33 kg/m²     Neurological Exam  Mental Status  Alert. Oriented to person, place, time and situation. Speech is normal. Language is fluent with no aphasia. Attention and concentration are normal.    Cranial Nerves  CN II: Visual acuity is normal. Visual fields full to confrontation.  CN III, IV, VI: Extraocular movements intact " bilaterally. Normal lids and orbits bilaterally. Pupils equal round and reactive to light bilaterally.  CN V: Facial sensation is normal.  CN VII:  Right: There is central facial weakness. Chronic right facial Bell's Palsy with Asymmetry, Right Blepharospasm-not active today with recent Botox.  Left: There is no facial weakness.  CN IX, X: Palate elevates symmetrically. Normal gag reflex.  CN XI: Shoulder shrug strength is normal.  CN XII: Tongue midline without atrophy or fasciculations.    Motor  Normal muscle bulk throughout. No fasciculations present. Normal muscle tone. The following abnormal movements were seen: Mild to moderate BUE fine kinetic hand tremors R>L, brisk finger and heel taps bilaterally, with a slight decrease in left foot heel taps, no cogwheel rigidity, no resting tremors..   Strength is 5/5 in all four extremities except as noted.  RA arthritis pain generally, decreased ROM left knee pending surgery.    Sensory  Light touch abnormality: Pinprick abnormality: Temperature abnormality: Vibration abnormality: Sensation: Decreased 1/3 feet at prior visit.     Reflexes                                            Right                      Left  Brachioradialis                    2+                         2+  Biceps                                 2+                         2+  Patellar                                1+                         1+  Achilles                                1+                         1+    Coordination    Finger-to-nose, rapid alternating movements and heel-to-shin normal bilaterally without dysmetria.    Gait  Casual gait: Normal stance. Normal stride length. Antalgic gait. Normal right arm swing. Normal left arm swing. Able to rise from chair without using arms.        Physical Exam  Constitutional:       Appearance: Normal appearance.   Eyes:      General: Lids are normal.      Extraocular Movements: Extraocular movements intact.      Pupils: Pupils are equal, round,  and reactive to light.   Neurological:      Mental Status: She is alert.      Coordination: Coordination is intact.      Deep Tendon Reflexes:      Reflex Scores:       Bicep reflexes are 2+ on the right side and 2+ on the left side.       Brachioradialis reflexes are 2+ on the right side and 2+ on the left side.       Patellar reflexes are 1+ on the right side and 1+ on the left side.       Achilles reflexes are 1+ on the right side and 1+ on the left side.  Psychiatric:         Mood and Affect: Mood and affect normal.         Speech: Speech normal.         Results:  Results  Laboratory Studies 10/2024:  CK level was normal at 94. Vitamin B12 level was 578. Folic acid level was greater than 20. Paraneoplastic antibody profile was normal.      Assessment/Plan:     Diagnoses and all orders for this visit:    1. Microangiopathy (Primary)  Comments:  aspirin every other day    2. Tremor  -     gabapentin (NEURONTIN) 100 MG capsule; Take 1-2 capsules 3 times a day  Dispense: 540 capsule; Refill: 1    3. Idiopathic peripheral neuropathy  -     gabapentin (NEURONTIN) 100 MG capsule; Take 1-2 capsules 3 times a day  Dispense: 540 capsule; Refill: 1           Assessment & Plan  1. Idiopathic peripheral neuropathy.  Her neuropathy symptoms have shown improvement with the current gabapentin regimen. She takes gabapentin 1 in the morning, 2 at 2:00 PM, and 2 at bedtime. She reports a side effect of dribbling while urinating, which started after beginning gabapentin. This side effect is unusual and will be monitored. She has not had any UTIs recently.    2. Essential tremor.  Her tremors have slightly improved. The tremors are equal in both hands, with a slight predominance in the right hand. There are no other Parkinson's findings such as shuffling, getting stuck to the ground, drooling, or trouble with swallowing.    3. Leg cramps.  She continues to experience persistent leg cramps, which have been severe enough to disrupt  her sleep. She is advised to increase her magnesium intake at night to help with the cramps, as long as it does not cause diarrhea. She can discuss this with Dr. Youngblood next month. Drinking pickle juice was also suggested as a potential remedy.    4. Medication management.  She has discontinued Flexeril, which she found beneficial for sleep, as gabapentin does not aid her sleep. She used Flexeril primarily for neck pain but reports managing well without it currently. She is advised to continue taking aspirin 81 mg every other day to avoid bleeding complications. She should hold aspirin 10 days before any surgeries and consult with her surgeon about when to resume it post-surgery.    Follow-up  She will follow up on 08/08/2025 as scheduled and then in December.      Reviewed medications, potential side effects and signs and symptoms to report. Discussed risk versus benefits of treatment plan with patient and/or family-including medications, labs and radiology that may be ordered. Addressed questions and concerns during visit. Patient and/or family verbalized understanding and agree with plan.    As part of this patient's treatment plan I am prescribing controlled substances. The patient has been made aware of appropriate use of such medications, including potential risk of somnolence, limited ability to drive and/or work safely, and potential for dependence or overdose. It has also been made clear that these medications are for use by the patient only, without concomitant use of alcohol or other substances unless prescribed. Keep out of reach of children.  Wai report has been reviewed. If this is going to be prescribed long term, [facility] [place] has also been read and signed by patient and myself.    During this visit the following were done:  Labs Reviewed [x]    Labs Ordered []    Radiology Reports Reviewed []    Radiology Ordered []    PCP Records Reviewed []    Referring Provider  Records Reviewed []    ER Records Reviewed []    Hospital Records Reviewed []    History Obtained From Family []    Radiology Images Reviewed []    Other Reviewed [x]    Records Requested []      02/18/25   08:59 EST    Patient or patient representative verbalized consent for the use of Ambient Listening during the visit with  BJ Lyman for chart documentation. 2/18/2025  10:17 EST    Note to patient: The 21st Century Cures Act makes medical notes like these available to patients in the interest of transparency. However, be advised this is a medical document. It is intended as peer to peer communication. It is written in medical language and may contain abbreviations or verbiage that are unfamiliar. It may appear blunt or direct. Medical documents are intended to carry relevant information, facts as evident, and the clinical opinion of the provider.

## 2025-02-18 NOTE — LETTER
February 18, 2025     Ilda Suarez MD  [address]  [address]  [facility] [address]    Patient: Alaina Kennedy   YOB: 1955   Date of Visit: 2/18/2025       Dear Ilda Suarez MD    Alaina Kennedy was in my office today. Below is a copy of my note.    If you have questions, please do not hesitate to call me. I look forward to following Alaina along with you.         Sincerely,        BJ Lyman        CC: BJ See APRN Matthew D. Ashmun, MD    Subjective:     Patient ID: Alaina Kennedy is a 69 y.o. female.    CC:   Chief Complaint   Patient presents with   • Follow-up     neuropathy   • Peripheral Neuropathy   • Tremors       HPI:   History of Present Illness  This is a 69-year-old female who was previously seen in the clinic on 10/18/2024.     She has been followed for tremors with familial tremor, known idiopathic peripheral neuropathy, and is under the care of rheumatology.     She also sees  Ophthalmology for a history of right Bell's palsy with blepharospasms and receives Botox injections by Dr. Darryl Pino, with the most recent injection on 01/29/2025.     At her last visit to the clinic, additional labs were completed to rule out any other etiology of her idiopathic peripheral neuropathy as recommended by the American Academy of Neurology guidelines for neuropathy with no known risk factors. Gabapentin was prescribed for her tremors and neuropathy symptoms, and she was referred to physical therapy. 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 It was recommended that she take aspirin 81 mg daily, and her rheumatology provider, BJ Allen at [facility], approved this regimen in conjunction with her other medications. She is here for follow-up and refills on her medicines today.    She reports an improvement in her neuropathy symptoms, attributing this to the efficacy of  gabapentin. Her current regimen includes one dose upon waking, two doses around 2:00 PM, and two doses at bedtime. She describes a sensation of regained feeling in her feet. However, she experiences urinary dribbling, which she believes started with the initiation of gabapentin. Despite this, she reports no difficulty in fully emptying her bladder, it just takes longer when she urinates. She has not had any recent urinary tract infections (UTIs).    She continues to experience persistent leg cramps, which have been severe enough to disrupt her sleep. She is currently taking magnesium 400 mg daily for irritable bowel syndrome (IBS) and is considering discussing an increase in this dosage with Dr. Youngblood, her Gastroenterologist, during her upcoming appointment next month.    She has discontinued Flexeril, which she found beneficial for sleep. She used Flexeril primarily for neck pain but reports managing well without it currently.    She has noticed a decrease in mobility in one of her feet, which she attributes potentially to her left knee issue. She has a history of knee surgery on the right side and plans for left knee replacement soon.    She reports increased bleeding and dryness of her nose with the daily aspirin along with increased bruising.    Supplemental Information  She had an IV of Remicade yesterday, which caused fatigue all day. She follows up with her cardiologist at [facility].    FAMILY HISTORY  Her mother had significant tremors and neuropathy.    Prior Neurological history and workup:  Tremors, microangiopathy and neuropathy for years.     She has preferred conservative management and has had some acupuncture treatments. She has also tried compounded neuro cream.      EEG 9/2018 for one episode of shaking was normal. No other spells with significant reduction in stress.     She also has peripheral neuropathy moderate axonal BLE with most recent emg/ncvs 9/2018. She has no pain. Numbness and  tingling intermittent. Responds well to acupuncture. Prefers conservative therapy.     She does tell me that she continues to have symptoms from the right Bell's palsy with blepharospasm. Seeing Dr. Pino at  ophthalmology and will have cataracts removed 8/2019 and then start Botox injections.      9/22/17 for follow up on right facial bells palsy which originally occurred on 12/6/16 and then slowly resolved on 5/15/17.  She had new symptoms present on 6/1/17 with reoccurrence of the right facial bells palsy. MRI of the brain with and without contrast which was completed on 8/30/17 showing bilateral periventricular white matter changes moderate with no abnormal contrast enhancement. Showed microangiopathy with history of severe migraines in past and was started on Aspirin 81mg daily. We did check her Lyme disease, angiotensin-converting enzyme, and RPR which were all within normal limits.  She had an MRA of the head without contrast which was unremarkable on 9/13/17 and MRA of the neck with contrast on 9/13/17 which was also unremarkable.      She is followed by Rheumatology for her RA and gets IV Remicade treatments.     She has a history of right Bell's palsy with blepharospasms and has received Botox injections with  ophthalmology for management.      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 She has been on aspirin previously, 81 mg/day. She has been on Remicade for 20 years and has not had a migraine since 1999. She has been experiencing intermittent vertigo for the past 1.5 years, which she believes is triggered by bright lights.     Laboratory Studies with PCP 5/2024:  Vitamin D 40.6. Thyroid Stimulating Hormone (TSH) was elevated at 6.35. Total cholesterol 151, LDL 62.     Imaging  MRI of the brain with and without contrast from 2017 showed microangiopathy. MRI of the brain  with and without contrast from 1/6/2021 and 1/3/2023 was without  contrast-all showed chronic small vessel ischemic changes stable over 6 years. I personally reviewed all 3 MRIs with imaging with Alaina. These do show moderate to advanced changes but have remained stable.      Testing  EMG and NCVS completed 9/20/2018 showed moderate axonal bilateral lower extremities.    Mother with neuropathy and tremors.     She has been diagnosed with fibromyalgia and has been on cyclobenzaprine for 30 years for chronic neck pain. Weaned off Fall 2024.    The following portions of the patient's history were reviewed and updated as appropriate: allergies, current medications, past family history, past medical history, past social history, past surgical history, and problem list.    Past Medical History:   Diagnosis Date   • Arthritis    • Bell's palsy    • Hyperlipidemia    • Hypertension    • Neuropathy    • Rheumatoid arthritis        Past Surgical History:   Procedure Laterality Date   • BACK SURGERY     • CHOLECYSTECTOMY     • FOOT SURGERY     • HYSTERECTOMY     • KNEE SURGERY     • TOTAL HIP ARTHROPLASTY         Social History     Socioeconomic History   • Marital status:    Tobacco Use   • Smoking status: Never   • Smokeless tobacco: Never   Vaping Use   • Vaping status: Never Used   • Passive vaping exposure: Yes   Substance and Sexual Activity   • Alcohol use: No   • Drug use: No   • Sexual activity: Defer       Family History   Problem Relation Age of Onset   • Lung cancer Father    • Cancer Maternal Grandmother    • Stroke Maternal Grandfather    • Kidney disease Mother    • Lung disease Mother    • Arrhythmia Mother    • No Known Problems Sister    • Rheum arthritis Paternal Grandmother    • No Known Problems Sister    • No Known Problems Half-Sister    • No Known Problems Daughter    • No Known Problems Son           Current Outpatient Medications:   •  amLODIPine (NORVASC) 5 MG tablet, Take 1 tablet by mouth Daily., Disp: 90 tablet, Rfl: 3  •  aspirin (Aspirin Childrens) 81  MG chewable tablet, Chew 1 tablet Daily., Disp: 90 tablet, Rfl: 3  •  azelastine (ASTELIN) 0.1 % nasal spray, Administer 2 sprays into the nostril(s) as directed by provider 2 (Two) Times a Day. Use in each nostril as directed, Disp: , Rfl:   •  Calcium Carbonate-Vitamin D 600-10 MG-MCG per tablet, Take 2 tablets by mouth Daily., Disp: , Rfl:   •  CeleBREX 200 MG capsule, Take 1 capsule by mouth Daily., Disp: , Rfl:   •  fluticasone (FLONASE) 50 MCG/ACT nasal spray, Administer 2 sprays into the nostril(s) as directed by provider Daily., Disp: , Rfl:   •  folic acid (FOLVITE) 1 MG tablet, Take 5 tablets by mouth Daily., Disp: , Rfl:   •  gabapentin (NEURONTIN) 100 MG capsule, Take 1-2 capsules 3 times a day, Disp: 540 capsule, Rfl: 1  •  inFLIXimab (Remicade) 100 MG injection, 7 mg/kg (500 mg) body weight IV q 8 weeks, Disp: , Rfl:   •  loratadine (CLARITIN REDITABS) 10 MG disintegrating tablet, Place 1 tablet on the tongue Daily., Disp: , Rfl:   •  Magnesium Oxide 400 MG capsule, Every 12 (Twelve) Hours., Disp: , Rfl:   •  methotrexate 2.5 MG tablet, Take 8 tablets by mouth 1 (One) Time Per Week. Taken on Sundays, Disp: , Rfl:   •  montelukast (SINGULAIR) 10 MG tablet, , Disp: , Rfl:   •  omeprazole (priLOSEC) 20 MG capsule, , Disp: , Rfl:   •  onabotulinumtoxina (BOTOX) 100 units reconstituted solution injection, Inject 3 Units into the appropriate muscle as directed by prescriber., Disp: , Rfl:   •  Pancrelipase, Lip-Prot-Amyl, (Zenpep) [id number] units capsule delayed-release particles capsule, Zenpep 40,000 unit-126,000 unit-168,000 unit capsule,delayed release  10 sample boxes, Disp: , Rfl:   •  Psyllium (Metamucil) 0.36 g capsule, Daily., Disp: , Rfl:   •  rosuvastatin (CRESTOR) 5 MG tablet, Take 1 tablet by mouth Daily., Disp: 90 tablet, Rfl: 3  •  tretinoin (RETIN-A) 0.05 % cream, , Disp: , Rfl:      Review of Systems   Musculoskeletal:  Positive for arthralgias and gait problem.   Neurological:  Positive  "for tremors and numbness.   All other systems reviewed and are negative.       Objective:  /82 (BP Location: Left arm, Patient Position: Sitting)   Pulse 72   Ht 162.6 cm (64\")   Wt 69.6 kg (153 lb 6.4 oz)   SpO2 99%   BMI 26.33 kg/m²     Neurological Exam  Mental Status  Alert. Oriented to person, place, time and situation. Speech is normal. Language is fluent with no aphasia. Attention and concentration are normal.    Cranial Nerves  CN II: Visual acuity is normal. Visual fields full to confrontation.  CN III, IV, VI: Extraocular movements intact bilaterally. Normal lids and orbits bilaterally. Pupils equal round and reactive to light bilaterally.  CN V: Facial sensation is normal.  CN VII:  Right: There is central facial weakness. Chronic right facial Bell's Palsy with Asymmetry, Right Blepharospasm-not active today with recent Botox.  Left: There is no facial weakness.  CN IX, X: Palate elevates symmetrically. Normal gag reflex.  CN XI: Shoulder shrug strength is normal.  CN XII: Tongue midline without atrophy or fasciculations.    Motor  Normal muscle bulk throughout. No fasciculations present. Normal muscle tone. The following abnormal movements were seen: Mild to moderate BUE fine kinetic hand tremors R>L, brisk finger and heel taps bilaterally, with a slight decrease in left foot heel taps, no cogwheel rigidity, no resting tremors..   Strength is 5/5 in all four extremities except as noted.  RA arthritis pain generally, decreased ROM left knee pending surgery.    Sensory  Light touch abnormality: Pinprick abnormality: Temperature abnormality: Vibration abnormality: Sensation: Decreased 1/3 feet at prior visit.     Reflexes                                            Right                      Left  Brachioradialis                    2+                         2+  Biceps                                 2+                         2+  Patellar                                1+                         " 1+  Achilles                                1+                         1+    Coordination    Finger-to-nose, rapid alternating movements and heel-to-shin normal bilaterally without dysmetria.    Gait  Casual gait: Normal stance. Normal stride length. Antalgic gait. Normal right arm swing. Normal left arm swing. Able to rise from chair without using arms.        Physical Exam  Constitutional:       Appearance: Normal appearance.   Eyes:      General: Lids are normal.      Extraocular Movements: Extraocular movements intact.      Pupils: Pupils are equal, round, and reactive to light.   Neurological:      Mental Status: She is alert.      Coordination: Coordination is intact.      Deep Tendon Reflexes:      Reflex Scores:       Bicep reflexes are 2+ on the right side and 2+ on the left side.       Brachioradialis reflexes are 2+ on the right side and 2+ on the left side.       Patellar reflexes are 1+ on the right side and 1+ on the left side.       Achilles reflexes are 1+ on the right side and 1+ on the left side.  Psychiatric:         Mood and Affect: Mood and affect normal.         Speech: Speech normal.         Results:  Results  Laboratory Studies 10/2024:  CK level was normal at 94. Vitamin B12 level was 578. Folic acid level was greater than 20. Paraneoplastic antibody profile was normal.      Assessment/Plan:     Diagnoses and all orders for this visit:    1. Microangiopathy (Primary)  Comments:  aspirin every other day    2. Tremor  -     gabapentin (NEURONTIN) 100 MG capsule; Take 1-2 capsules 3 times a day  Dispense: 540 capsule; Refill: 1    3. Idiopathic peripheral neuropathy  -     gabapentin (NEURONTIN) 100 MG capsule; Take 1-2 capsules 3 times a day  Dispense: 540 capsule; Refill: 1           Assessment & Plan  1. Idiopathic peripheral neuropathy.  Her neuropathy symptoms have shown improvement with the current gabapentin regimen. She takes gabapentin 1 in the morning, 2 at 2:00 PM, and 2 at bedtime.  She reports a side effect of dribbling while urinating, which started after beginning gabapentin. This side effect is unusual and will be monitored. She has not had any UTIs recently.    2. Essential tremor.  Her tremors have slightly improved. The tremors are equal in both hands, with a slight predominance in the right hand. There are no other Parkinson's findings such as shuffling, getting stuck to the ground, drooling, or trouble with swallowing.    3. Leg cramps.  She continues to experience persistent leg cramps, which have been severe enough to disrupt her sleep. She is advised to increase her magnesium intake at night to help with the cramps, as long as it does not cause diarrhea. She can discuss this with Dr. Youngblood next month. Drinking pickle juice was also suggested as a potential remedy.    4. Medication management.  She has discontinued Flexeril, which she found beneficial for sleep, as gabapentin does not aid her sleep. She used Flexeril primarily for neck pain but reports managing well without it currently. She is advised to continue taking aspirin 81 mg every other day to avoid bleeding complications. She should hold aspirin 10 days before any surgeries and consult with her surgeon about when to resume it post-surgery.    Follow-up  She will follow up on 08/08/2025 as scheduled and then in December.      Reviewed medications, potential side effects and signs and symptoms to report. Discussed risk versus benefits of treatment plan with patient and/or family-including medications, labs and radiology that may be ordered. Addressed questions and concerns during visit. Patient and/or family verbalized understanding and agree with plan.    As part of this patient's treatment plan I am prescribing controlled substances. The patient has been made aware of appropriate use of such medications, including potential risk of somnolence, limited ability to drive and/or work safely, and potential for dependence or  overdose. It has also been made clear that these medications are for use by the patient only, without concomitant use of alcohol or other substances unless prescribed. Keep out of reach of children.  Wai report has been reviewed. If this is going to be prescribed long term, [facility] Controlled Substance Agreement Contract has also been read and signed by patient and myself.    During this visit the following were done:  Labs Reviewed [x]    Labs Ordered []    Radiology Reports Reviewed []    Radiology Ordered []    PCP Records Reviewed []    Referring Provider Records Reviewed []    ER Records Reviewed []    Hospital Records Reviewed []    History Obtained From Family []    Radiology Images Reviewed []    Other Reviewed [x]    Records Requested []      02/18/25   08:59 EST    Patient or patient representative verbalized consent for the use of Ambient Listening during the visit with  BJ Lyman for chart documentation. 2/18/2025  10:17 EST    Note to patient: The 21st Century Cures Act makes medical notes like these available to patients in the interest of transparency. However, be advised this is a medical document. It is intended as peer to peer communication. It is written in medical language and may contain abbreviations or verbiage that are unfamiliar. It may appear blunt or direct. Medical documents are intended to carry relevant information, facts as evident, and the clinical opinion of the provider.

## 2025-03-07 ENCOUNTER — TELEPHONE (OUTPATIENT)
Dept: NEUROLOGY | Facility: CLINIC | Age: 70
End: 2025-03-07

## 2025-03-07 NOTE — TELEPHONE ENCOUNTER
I did not order an ACHr binding antibody lab on her. The only labs I ordered were paraneoplastic autoantibody, vitamin b6, vitamin b12, methylmalonic acid, folate, ck, immunofixation and protein electrophoresis.     Diagnoses used for her tests were G60.9, R25.1, I73.9 and R26.89. I do see where the ACHr lab was resulted, however, I did not order that particular test independently. It was likely part of the paraneoplastic antibody test. If she has a letter from her insurance, we can try to appeal this.

## 2025-03-07 NOTE — TELEPHONE ENCOUNTER
Caller: Alaina Kennedy    Relationship: Self    Best call back number: [phone]    What was the call regarding: PATIENT IS CALLING BECAUSE THERE WAS A LAB TEST THAT MS. CANELA ORDERED THAT WAS NOT COVERED BY INSURANCE AND COST THE PATIENT $[phone] OUT OF POCKET. CATIE CANELA WANTED TO KNOW WHICH TEST THAT WAS AND THE PATIENT IS CALLING TO TELL HER IT WAS THE ACHR BINDING ANTIBODY TEST. THE CODE WAS 78719.    Is it okay if the provider responds through MyChart: NO

## 2025-03-11 NOTE — TELEPHONE ENCOUNTER
Spoke with Alaina and let her know we do not do Prior authorizations for labs unless indicated when the provider places the order, but each insurance plan varies on cost with blood work. Sera states she did not order an ACHR independently but does see where it was resulted so states It was likely part of the paraneoplastic antibody test.   She states if you can get us a copy of the denial letter from your insurance, we can try and appeal this or state that it was medically necessary and send over the office notes supporting it.      Alaina states understanding. States she has received several reminders from [facility] so has already paid it, but has never been charged for labwork before. She will get me the info she received regarding the charge and will see if there is anything we are able to do to fight it.     Provided her with my email address.

## 2025-03-24 NOTE — PROGRESS NOTES
Subjective:     Encounter Date:04/02/2025      Patient ID: Alaina Kennedy is a 70 y.o.   white female from [place], [place], retired as a  at [employer].     REFERRING PROVIDER: BJ Mosquera  PHYSICIAN: Ilda Suarez MD   NEUROLOGIST: BJ Steven  RHEUMATOLOGIST: Giuliano Mckoy MD  ORTHOPEDIC SURGEON: Reece Vaz MD.    Chief Complaint:   Chief Complaint   Patient presents with    Palpitations     Problem List:  Pre-syncope, likely vasovagal in the setting of pancreatitis  Echocardiogram 9/13/2017: LVEF 60%, LV diastolic dysfunction grade 1 consistent with impaired relaxation, RVSP 10.6 mmHg  Regadenoson stress test 10/05/2017: Test is negative for anginal symptoms or diagnostic ST segment changes, LVEF greater than 70%, myocardial perfusion imaging indicates a normal myocardial perfusion study with no evidence of ischemia  [facility] ED January 2021 presyncope, hypertension in the setting of acute pancreatitis  MRA head/neck 01/06/2021: Chronic changes seen within the periventricular and subcortical white matter with no abnormal contrast-enhancement.  No acute intracranial abnormality.  Unremarkable MRA of the head and neck  MRI brain 01/06/2021:Chronic changes seen within the periventricular and subcortical white matter with no abnormal contrast-enhancement.  No acute intracranial abnormality.  Unremarkable MRA of the head and neck  Echocardiogram 02/14/2021: LVEF 68%, RVSP less than 35 mmHg, normal RV cavity size, wall thickness, systolic function and septal motion noted.  Aortic valve exhibits mild sclerosis  Residual class I symptoms with normal electrocardiogram with subsequent acceptable event monitor, February 2021, July 2021, February 2022, August 2022, May 2023, March 2024  Echocardiogram 3/29/2024: LVEF 59%, decreased tissue Doppler velocities without other criteria for diastolic dysfunction, trace MR, trace TR  Palpitations with abnormal event monitor  January 2021, with preliminary demonstrating brief asymptomatic PSVT, no atrial fibrillation/flutter, AV block, or ventricular tachycardia; palpitations have ceased since decreasing caffeine consumption February 2021  Dizziness, [facility] ED visit March 2023 for vertigo/sinus infection  Hypertension  Hyperlipidemia; on statin therapy  GERD/hiatal hernia  Rheumatoid arthritis  Essential tremor  Peripheral neuropathy  History of Bell's palsy 2017  [facility] 2-day hospitalization January 2021 for acute pancreatitis  [facility] ED, May 2021, for constipation on opioid medication s/p right hip surgery. She was given mineral oil enema in the ED and prescribed mag citrate and hydrocortisone cream.  Systolic heart murmur  COVID in 2022, January 2024 with sinus congestion  Surgical history:  Hysterectomy  Cholecystectomy  Left foot surgery  Right hip replacement, May 2021  Right knee replacement October 2022  Left foot surgery to remove screw  Upcoming left knee replacement May 2025    Allergies   Allergen Reactions    Losartan GI Intolerance    Oxycodone Itching    Quinolones Other (See Comments)     Bladder spasms    Silver Nitrate Rash    Sulfa Antibiotics Rash       Current Outpatient Medications   Medication Instructions    amitriptyline (ELAVIL) 20 mg, Nightly    amLODIPine (NORVASC) 5 mg, Oral, Every 24 Hours Scheduled    aspirin (ASPIRIN CHILDRENS) 81 mg, Oral, Daily    azelastine (ASTELIN) 0.1 % nasal spray 2 sprays, 2 Times Daily    Calcium Carbonate-Vitamin D 600-10 MG-MCG per tablet 2 tablets, Daily    CeleBREX 200 MG capsule Take 1 capsule by mouth Daily.    fluticasone (FLONASE) 50 MCG/ACT nasal spray 2 sprays, Daily    folic acid (FOLVITE) 5 mg, Daily    gabapentin (NEURONTIN) 100 MG capsule Take 1-2 capsules 3 times a day    inFLIXimab (Remicade) 100 MG injection 7 mg/kg (500 mg) body weight IV q 8 weeks    loratadine (CLARITIN REDITABS) 10 mg, Daily    Magnesium Oxide 400 MG capsule Every 12 Hours Scheduled    methotrexate 20  mg, Weekly    minoxidil (LONITEN) 2.5 MG tablet 0.5 tablets, Daily    montelukast (SINGULAIR) 10 MG tablet No dose, route, or frequency recorded.    omeprazole (priLOSEC) 20 MG capsule No dose, route, or frequency recorded.    onabotulinumtoxina (BOTOX) 3 Units    Pancrelipase, Lip-Prot-Amyl, (Zenpep) 30009-847319 units capsule delayed-release particles capsule Zenpep 40,000 unit-126,000 unit-168,000 unit capsule,delayed release   10 sample boxes    Psyllium (Metamucil) 0.36 g capsule Daily    rosuvastatin (CRESTOR) 5 mg, Oral, Daily    tretinoin (RETIN-A) 0.05 % cream No dose, route, or frequency recorded.         HISTORY OF PRESENT ILLNESS:  The patient is here for a 6-month follow-up.  Echocardiogram March 2024 showed LVEF 59%, decreased tissue Doppler velocities without other criteria for diastolic dysfunction, trace MR, trace TR. Patient had labs recently and will try to have the results sent to me for review.  She denies any chest pain, shortness of breath, palpitations, or syncope.  She had an episode of lightheadedness when she was getting PRP injections into her feet recently.  They did not check her blood pressure at that time.  She says that 30 minutes later she was eating lunch at first watch and felt fine.  This was an isolated episode and she has not had other episodes like this since her last appointment.  Blood pressures have been WNL.  She has upcoming left knee replacement surgery 5/6/2025 with Dr. Vaz.  Patient may be interested in weight loss medications but is going to address this after she has her knee replacement surgery.      ROS   All other systems reviewed and otherwise negative.      ECG 12 Lead    Date/Time: 4/2/2025 10:27 AM  Performed by: Pilar Harrington APRN    Authorized by: Pilar Harrington APRN  Rhythm comments: Normal sinus rhythm, low voltage QRS, borderline ECG, 79 bpm,  ms, QRS 72 ms, QTc 426 ms, no significant change from last ECG March 2024            "  Objective:       Vitals:    04/02/25 0954 04/02/25 0956   BP: 122/80 120/76   BP Location: Right arm Right arm   Patient Position: Sitting Standing   Cuff Size: Adult Adult   Pulse: 85 92   SpO2: 98% 98%   Weight: 70.3 kg (155 lb)    Height: 162.6 cm (64\")      Body mass index is 26.61 kg/m².  Wt Readings from Last 2 Encounters:   04/02/25 70.3 kg (155 lb)   02/18/25 69.6 kg (153 lb 6.4 oz)        Constitutional:       Appearance: Healthy appearance. Not in distress.   Neck:      Vascular: No JVR. JVD normal.   Pulmonary:      Effort: Pulmonary effort is normal.      Breath sounds: Normal breath sounds. No wheezing. No rhonchi. No rales.   Chest:      Chest wall: Not tender to palpatation.   Cardiovascular:      PMI at left midclavicular line. Normal rate. Regular rhythm. Normal S1. Normal S2.       Murmurs: There is a grade 2/6 systolic murmur at the URSB.      No gallop.  No click. No rub.   Pulses:     Intact distal pulses.   Edema:     Peripheral edema absent.   Abdominal:      General: Bowel sounds are normal.      Palpations: Abdomen is soft.      Tenderness: There is no abdominal tenderness.   Musculoskeletal: Normal range of motion.         General: No tenderness. Skin:     General: Skin is warm and dry.   Neurological:      General: No focal deficit present.      Mental Status: Alert and oriented to person, place and time.           Lab Review:   Lab Results   Component Value Date    GLUCOSE 104 (H) 03/01/2023    BUN 20 03/01/2023    CREATININE 0.67 03/01/2023    EGFRIFNONA 79 05/13/2021    BCR 29.9 (H) 03/01/2023    CO2 26.0 03/01/2023    CALCIUM 9.1 03/01/2023    ALBUMIN 4.2 03/01/2023    AST 21 03/01/2023    ALT 24 03/01/2023       Lab Results   Component Value Date    WBC 9.65 03/01/2023    HGB 13.7 03/01/2023    HCT 41.4 03/01/2023    MCV 91.6 03/01/2023     03/01/2023       Lab Results   Component Value Date    CHOL 128 01/13/2021     Lab Results   Component Value Date    TRIG 66 01/13/2021 "     Lab Results   Component Value Date    HDL 65 (H) 01/13/2021     Lab Results   Component Value Date    LDL 49 01/13/2021 4/23/2024:  CMP: Sodium 143, potassium 4, chloride 105, carbon dioxide 26, glucose 89, BUN 19, creatinine 0.76, calcium 8.6, GFR 85, protein 7.2, albumin 4.4, globulin 2.8, alkaline phosphatase 75, ALT 20, AST 16, bilirubin 0.5  Hemoglobin A1c 5.7%  Vitamin D-40.6  Free thyroxine-0.95  CBC: WBC 7.1, RBC 4.71, hemoglobin 14.9, hematocrit 44.3, MCV 94, MCH 31.6, MCHC 33.6, platelets 343, MPV 9.2, RDW 13.2, neutrophils 30.3, lymphocytes 59.4, monocytes 7.1, eos 2.8, basos 0.3  Lipid panel: Cholesterol 140, triglycerides 129, HDL 52, LDL 62  TSH 6.35           Results for orders placed in visit on 05/26/23    Adult Transthoracic Echo Complete W/ Cont if Necessary Per Protocol    Interpretation Summary    Left ventricular systolic function is normal. Calculated left ventricular EF = 59% Normal left ventricular cavity size and wall thickness noted. All left ventricular wall segments contract normally. Normal left atrial pressure estimated by E/e' ratio. Decreased tissue doppler velocities without other criteria for diastolic dysfunction.    Normal right ventricular cavity size, wall thickness, systolic function and septal motion noted.    Normal sized atria.    The aortic valve is structurally normal with no regurgitation or stenosis present.    The mitral valve is structurally normal with no significant stenosis present. Trace mitral valve regurgitation is present.    The tricuspid valve is structurally normal with no significant stenosis present. Trace tricuspid valve regurgitation is present. Estimated right ventricular systolic pressure from tricuspid regurgitation is normal (<35 mmHg).            Advance Care Planning   ACP discussion was held with the patient during this visit. Patient has an advance directive (not in EMR), copy requested.      Assessment:     Overall continued acceptable  course with no new interim cardiopulmonary complaints with good functional status. We will defer additional diagnostic or therapeutic intervention from a cardiac perspective at this time. Hopefully I will get to review the patient's next laboratory testing results.  Echocardiogram March 2024 showed LVEF 59%, decreased tissue Doppler velocities without other criteria for diastolic dysfunction, trace MR, trace TR. Patient has cardiac clearance to have upcoming left knee replacement surgery with Dr. Vaz 5/6/2025.     Diagnosis Plan   1. Palpitations  Stable, no recurrence      2. Essential hypertension  Controlled, continue current cardiac medications      3. Hyperlipidemia LDL goal <100  No new labs to review, continue rosuvastatin             Plan:         Patient to continue current medications and close follow up with the above providers.  Tentative cardiology follow up in October 2025 or patient may return sooner PRN.      Electronically signed by BJ See, 04/02/25, 10:29 AM EDT.

## 2025-04-02 ENCOUNTER — OFFICE VISIT (OUTPATIENT)
Dept: CARDIOLOGY | Facility: CLINIC | Age: 70
End: 2025-04-02
Payer: MEDICARE

## 2025-04-02 VITALS
WEIGHT: 155 LBS | BODY MASS INDEX: 26.46 KG/M2 | DIASTOLIC BLOOD PRESSURE: 76 MMHG | HEART RATE: 92 BPM | SYSTOLIC BLOOD PRESSURE: 120 MMHG | HEIGHT: 64 IN | OXYGEN SATURATION: 98 %

## 2025-04-02 DIAGNOSIS — I10 ESSENTIAL HYPERTENSION: ICD-10-CM

## 2025-04-02 DIAGNOSIS — R00.2 PALPITATIONS: Primary | ICD-10-CM

## 2025-04-02 DIAGNOSIS — E78.5 HYPERLIPIDEMIA LDL GOAL <100: ICD-10-CM

## 2025-04-02 PROCEDURE — 93000 ELECTROCARDIOGRAM COMPLETE: CPT | Performed by: NURSE PRACTITIONER

## 2025-04-02 PROCEDURE — 3078F DIAST BP <80 MM HG: CPT | Performed by: NURSE PRACTITIONER

## 2025-04-02 PROCEDURE — 1159F MED LIST DOCD IN RCRD: CPT | Performed by: NURSE PRACTITIONER

## 2025-04-02 PROCEDURE — 1160F RVW MEDS BY RX/DR IN RCRD: CPT | Performed by: NURSE PRACTITIONER

## 2025-04-02 PROCEDURE — 99214 OFFICE O/P EST MOD 30 MIN: CPT | Performed by: NURSE PRACTITIONER

## 2025-04-02 PROCEDURE — 3074F SYST BP LT 130 MM HG: CPT | Performed by: NURSE PRACTITIONER

## 2025-04-02 RX ORDER — AMITRIPTYLINE HYDROCHLORIDE 10 MG/1
20 TABLET ORAL NIGHTLY
COMMUNITY

## 2025-04-02 RX ORDER — MINOXIDIL 2.5 MG/1
0.5 TABLET ORAL DAILY
COMMUNITY
Start: 2025-03-12

## 2025-08-08 ENCOUNTER — OFFICE VISIT (OUTPATIENT)
Dept: NEUROLOGY | Facility: CLINIC | Age: 70
End: 2025-08-08
Payer: MEDICARE

## 2025-08-08 VITALS
SYSTOLIC BLOOD PRESSURE: 126 MMHG | DIASTOLIC BLOOD PRESSURE: 76 MMHG | HEIGHT: 64 IN | HEART RATE: 75 BPM | BODY MASS INDEX: 25.18 KG/M2 | OXYGEN SATURATION: 98 % | WEIGHT: 147.5 LBS

## 2025-08-08 DIAGNOSIS — I73.9 MICROANGIOPATHY: ICD-10-CM

## 2025-08-08 DIAGNOSIS — R25.1 TREMOR: ICD-10-CM

## 2025-08-08 DIAGNOSIS — G60.9 IDIOPATHIC PERIPHERAL NEUROPATHY: Primary | ICD-10-CM

## 2025-08-08 PROBLEM — H43.399 VITREOUS FLOATERS: Status: ACTIVE | Noted: 2024-07-02

## 2025-08-08 PROBLEM — K21.9 GASTROESOPHAGEAL REFLUX DISEASE: Status: ACTIVE | Noted: 2024-04-18

## 2025-08-08 PROBLEM — B00.2 ORAL HERPES SIMPLEX INFECTION: Status: ACTIVE | Noted: 2024-04-18

## 2025-08-08 PROBLEM — H25.13 AGE-RELATED NUCLEAR CATARACT OF BOTH EYES: Status: ACTIVE | Noted: 2024-07-02

## 2025-08-08 PROCEDURE — 1160F RVW MEDS BY RX/DR IN RCRD: CPT | Performed by: NURSE PRACTITIONER

## 2025-08-08 PROCEDURE — 3074F SYST BP LT 130 MM HG: CPT | Performed by: NURSE PRACTITIONER

## 2025-08-08 PROCEDURE — 99214 OFFICE O/P EST MOD 30 MIN: CPT | Performed by: NURSE PRACTITIONER

## 2025-08-08 PROCEDURE — 1159F MED LIST DOCD IN RCRD: CPT | Performed by: NURSE PRACTITIONER

## 2025-08-08 PROCEDURE — 3078F DIAST BP <80 MM HG: CPT | Performed by: NURSE PRACTITIONER

## 2025-08-08 RX ORDER — GABAPENTIN 100 MG/1
CAPSULE ORAL
Qty: 540 CAPSULE | Refills: 1 | Status: SHIPPED | OUTPATIENT
Start: 2025-08-08

## 2025-08-08 RX ORDER — ZOLEDRONIC ACID 0.05 MG/ML
5 INJECTION, SOLUTION INTRAVENOUS ONCE
COMMUNITY
Start: 2025-06-30